# Patient Record
Sex: FEMALE | Race: WHITE | Employment: OTHER | URBAN - METROPOLITAN AREA
[De-identification: names, ages, dates, MRNs, and addresses within clinical notes are randomized per-mention and may not be internally consistent; named-entity substitution may affect disease eponyms.]

---

## 2017-03-06 ENCOUNTER — APPOINTMENT (OUTPATIENT)
Dept: ULTRASOUND IMAGING | Facility: HOSPITAL | Age: 57
DRG: 603 | End: 2017-03-06
Attending: EMERGENCY MEDICINE
Payer: MEDICARE

## 2017-03-06 ENCOUNTER — APPOINTMENT (OUTPATIENT)
Dept: GENERAL RADIOLOGY | Facility: HOSPITAL | Age: 57
DRG: 603 | End: 2017-03-06
Attending: EMERGENCY MEDICINE
Payer: MEDICARE

## 2017-03-06 ENCOUNTER — HOSPITAL ENCOUNTER (INPATIENT)
Facility: HOSPITAL | Age: 57
LOS: 4 days | Discharge: NURSING, REHAB OR LONG-TERM CARE | DRG: 603 | End: 2017-03-10
Attending: EMERGENCY MEDICINE | Admitting: HOSPITALIST
Payer: MEDICARE

## 2017-03-06 DIAGNOSIS — L03.116 CELLULITIS OF LEFT LOWER EXTREMITY: Primary | ICD-10-CM

## 2017-03-06 DIAGNOSIS — S80.12XA CONTUSION OF LEG, LEFT, INITIAL ENCOUNTER: ICD-10-CM

## 2017-03-06 PROBLEM — R73.9 HYPERGLYCEMIA: Status: ACTIVE | Noted: 2017-03-06

## 2017-03-06 PROBLEM — R79.89 AZOTEMIA: Status: ACTIVE | Noted: 2017-03-06

## 2017-03-06 LAB
ALBUMIN SERPL-MCNC: 3.2 G/DL (ref 3.5–4.8)
ALP LIVER SERPL-CCNC: 153 U/L (ref 46–118)
ALT SERPL-CCNC: 22 U/L (ref 14–54)
AST SERPL-CCNC: 15 U/L (ref 15–41)
BASOPHILS # BLD AUTO: 0.03 X10(3) UL (ref 0–0.1)
BASOPHILS NFR BLD AUTO: 0.5 %
BILIRUB SERPL-MCNC: 0.2 MG/DL (ref 0.1–2)
BUN BLD-MCNC: 25 MG/DL (ref 8–20)
CALCIUM BLD-MCNC: 9.4 MG/DL (ref 8.3–10.3)
CHLORIDE: 109 MMOL/L (ref 101–111)
CO2: 28 MMOL/L (ref 22–32)
CREAT BLD-MCNC: 0.66 MG/DL (ref 0.55–1.02)
EOSINOPHIL # BLD AUTO: 0.1 X10(3) UL (ref 0–0.3)
EOSINOPHIL NFR BLD AUTO: 1.6 %
ERYTHROCYTE [DISTWIDTH] IN BLOOD BY AUTOMATED COUNT: 13.5 % (ref 11.5–16)
GLUCOSE BLD-MCNC: 131 MG/DL (ref 70–99)
HCT VFR BLD AUTO: 41 % (ref 34–50)
HGB BLD-MCNC: 13.3 G/DL (ref 12–16)
IMMATURE GRANULOCYTE COUNT: 0.02 X10(3) UL (ref 0–1)
IMMATURE GRANULOCYTE RATIO %: 0.3 %
LYMPHOCYTES # BLD AUTO: 0.94 X10(3) UL (ref 0.9–4)
LYMPHOCYTES NFR BLD AUTO: 14.6 %
M PROTEIN MFR SERPL ELPH: 7.5 G/DL (ref 6.1–8.3)
MCH RBC QN AUTO: 29.8 PG (ref 27–33.2)
MCHC RBC AUTO-ENTMCNC: 32.4 G/DL (ref 31–37)
MCV RBC AUTO: 91.7 FL (ref 81–100)
MONOCYTES # BLD AUTO: 0.47 X10(3) UL (ref 0.1–0.6)
MONOCYTES NFR BLD AUTO: 7.3 %
NEUTROPHIL ABS PRELIM: 4.88 X10 (3) UL (ref 1.3–6.7)
NEUTROPHILS # BLD AUTO: 4.88 X10(3) UL (ref 1.3–6.7)
NEUTROPHILS NFR BLD AUTO: 75.7 %
PHENYTOIN (DILANTIN): 9.2 UG/ML (ref 10–20)
PLATELET # BLD AUTO: 206 10(3)UL (ref 150–450)
POTASSIUM SERPL-SCNC: 3.7 MMOL/L (ref 3.6–5.1)
RBC # BLD AUTO: 4.47 X10(6)UL (ref 3.8–5.1)
RED CELL DISTRIBUTION WIDTH-SD: 45.8 FL (ref 35.1–46.3)
SODIUM SERPL-SCNC: 144 MMOL/L (ref 136–144)
WBC # BLD AUTO: 6.4 X10(3) UL (ref 4–13)

## 2017-03-06 PROCEDURE — 99223 1ST HOSP IP/OBS HIGH 75: CPT | Performed by: HOSPITALIST

## 2017-03-06 PROCEDURE — 73552 X-RAY EXAM OF FEMUR 2/>: CPT

## 2017-03-06 PROCEDURE — 73590 X-RAY EXAM OF LOWER LEG: CPT

## 2017-03-06 PROCEDURE — 93970 EXTREMITY STUDY: CPT

## 2017-03-06 RX ORDER — ACETAMINOPHEN 325 MG/1
650 TABLET ORAL EVERY 6 HOURS PRN
Status: DISCONTINUED | OUTPATIENT
Start: 2017-03-06 | End: 2017-03-10

## 2017-03-06 RX ORDER — POTASSIUM CHLORIDE 20 MEQ/1
40 TABLET, EXTENDED RELEASE ORAL ONCE
Status: COMPLETED | OUTPATIENT
Start: 2017-03-06 | End: 2017-03-06

## 2017-03-06 RX ORDER — FAMOTIDINE 20 MG/1
20 TABLET ORAL 2 TIMES DAILY
Status: DISCONTINUED | OUTPATIENT
Start: 2017-03-06 | End: 2017-03-10

## 2017-03-06 RX ORDER — ENOXAPARIN SODIUM 100 MG/ML
40 INJECTION SUBCUTANEOUS DAILY
Status: DISCONTINUED | OUTPATIENT
Start: 2017-03-06 | End: 2017-03-10

## 2017-03-06 RX ORDER — FUROSEMIDE 40 MG/1
40 TABLET ORAL DAILY
Status: DISCONTINUED | OUTPATIENT
Start: 2017-03-06 | End: 2017-03-06

## 2017-03-06 RX ORDER — CALCITRIOL 0.25 UG/1
0.25 CAPSULE, LIQUID FILLED ORAL DAILY
COMMUNITY
End: 2018-07-21

## 2017-03-06 RX ORDER — SODIUM CHLORIDE 9 MG/ML
INJECTION, SOLUTION INTRAVENOUS CONTINUOUS
Status: CANCELLED | OUTPATIENT
Start: 2017-03-06 | End: 2017-03-06

## 2017-03-06 RX ORDER — PHENYTOIN SODIUM 100 MG/1
300 CAPSULE, EXTENDED RELEASE ORAL DAILY
Status: DISCONTINUED | OUTPATIENT
Start: 2017-03-06 | End: 2017-03-10

## 2017-03-06 RX ORDER — ONDANSETRON 2 MG/ML
4 INJECTION INTRAMUSCULAR; INTRAVENOUS EVERY 6 HOURS PRN
Status: DISCONTINUED | OUTPATIENT
Start: 2017-03-06 | End: 2017-03-10

## 2017-03-06 RX ORDER — METOCLOPRAMIDE HYDROCHLORIDE 5 MG/ML
10 INJECTION INTRAMUSCULAR; INTRAVENOUS EVERY 8 HOURS PRN
Status: DISCONTINUED | OUTPATIENT
Start: 2017-03-06 | End: 2017-03-10

## 2017-03-06 RX ORDER — SODIUM CHLORIDE 9 MG/ML
INJECTION, SOLUTION INTRAVENOUS ONCE
Status: COMPLETED | OUTPATIENT
Start: 2017-03-06 | End: 2017-03-06

## 2017-03-06 RX ORDER — CALCITRIOL 0.25 UG/1
0.25 CAPSULE, LIQUID FILLED ORAL DAILY
Status: DISCONTINUED | OUTPATIENT
Start: 2017-03-06 | End: 2017-03-10

## 2017-03-06 RX ORDER — FUROSEMIDE 10 MG/ML
40 INJECTION INTRAMUSCULAR; INTRAVENOUS
Status: DISCONTINUED | OUTPATIENT
Start: 2017-03-06 | End: 2017-03-08

## 2017-03-06 NOTE — ED PROVIDER NOTES
Patient Seen in: [facility] Emergency Department    History   Patient presents with:  Fall (musculoskeletal, neurologic)    Stated Complaint: fall    HPI    59-year-old female complaining of left leg pain.   This patient has cerebral palsy seizure di src 03/06/17 0708 Temporal   SpO2 03/06/17 0708 99 %   O2 Device 03/06/17 0708 None (Room air)       Current:/100 mmHg  Pulse 115  Temp(Src) 97.4 °F (36.3 °C) (Oral)  Resp 20  Ht 147.3 cm (4' 10\")  Wt 68.04 kg  BMI 31.36 kg/m2  SpO2 100%        Phys these tests on the individual orders.    PHENYTOIN (DILANTIN) TOTAL   RAINBOW DRAW BLUE   RAINBOW DRAW GOLD   RAINBOW DRAW LAVENDER   RAINBOW DRAW LIGHT GREEN   CBC W/ DIFFERENTIAL       MDM   Ultrasound and x-rays are unremarkable the BUN is 25 the patient

## 2017-03-06 NOTE — CONSULTS
[facility] dosing service    Initial Pharmacokinetic Consult for Vancomycin Dosing     Vy Madrigal is a 64year old female admitted on 3/6 who is being treated for cellulitis.   Pharmacy has been asked to dose Vancomycin by Dr. Veena Josue    She has No K Extension: [phone]

## 2017-03-06 NOTE — H&P
[facility] HOSPITALIST  History and Physical     Cecilia Lizzeth Patient Status:  Inpatient    1960 MRN [id number]   [facility] 5NW-A Attending Joshua Whyte, DO   Hosp Day # 0 PCP Ania Dubon     Chief Complaint: Fall    History of negatives noted in the HPI. Physical Exam:    /74 mmHg  Pulse 111  Temp(Src) 97.4 °F (36.3 °C) (Oral)  Resp 20  Ht 147.3 cm (4' 10\")  Wt 150 lb (68.04 kg)  BMI 31.36 kg/m2  SpO2 100%  General: No acute distress. Alert and oriented x 3.   HEENT: No DO  5/4/5442    **Certification        PHYSICIAN Certification of Need for Inpatient Hospitalization - Initial Certification      Patient will require inpatient services that will reasonably be expected to span two midnight's based on the clinical document

## 2017-03-06 NOTE — CM/SW NOTE
SW met with patient to assess for discharge needs. Patient identified she lives alone in a third story condo. Her condo building has an elevator. Patient has a motorized wheelchair at home.   Patient identified she has a Caregiver through an agency [facility]

## 2017-03-06 NOTE — SLP NOTE
ADULT SWALLOWING EVALUATION    ASSESSMENT & PLAN   ASSESSMENT  Bedside swallow evaluation completed. Per RN, pt demonstrated coughing with oral pills. Pt denies difficulty swallowing food/liquid, but reports, \"I usually take coated pills.  I've always had Functional  Symmetry: Within Functional Limits  Strength:  Within Functional Limits  Tone: Within Functional Limits  Range of Motion: Within Functional Limits  Rate of Motion: Within Functional Limits    Voice Quality: Clear  Respiratory Status: Unlabored

## 2017-03-06 NOTE — CONSULTS
INFECTIOUS DISEASE CONSULTATION    Giuliana Perez Patient Status:  Inpatient    1960 MRN [id number]   [facility] 5NW-A Attending Rafia Angel, [address] [facility] Day # 0 PCP Lindy Mcqueen Once **FOLLOWED BY** [START ON 3/7/2017] Vancomycin HCl (VANCOCIN) 750 mg in sodium chloride 0.9 % 250 mL IVPB, 15 mg/kg (Order-Specific), Intravenous, Q12H    Review of Systems:    A comprehensive 10 point review of systems was completed.   Pertinent posit primary service        Juan M Whittaker MD  [facility]  [phone]

## 2017-03-06 NOTE — CM/SW NOTE
Per medics, patient has called 911 multiple times due to \"falling out of wheelchair and unable to get up on her own\". Patient states she has been having trouble sitting up because her back is weak.  Patient would like to go to [facility] as an inpatient f

## 2017-03-06 NOTE — ED INITIAL ASSESSMENT (HPI)
Per report pt found on the floor near wheelchair with possible L leg injury & notable cellulitis of legs.  Pt lives alone, wheelchair bound with home j=health care

## 2017-03-06 NOTE — ED NOTES
Pt requested RN call her mother Shashi Solis at [phone]. RN called and updated pt's mother on plan of care. Pt's mother stated she has had cellulitis of her left leg multiple times. No questions at this time.

## 2017-03-06 NOTE — ED NOTES
Pt returned from ultrasound. Sleeping on cart. Updated that mother was notified, pt went back to sleep.

## 2017-03-07 LAB
BILIRUB UR QL STRIP.AUTO: NEGATIVE
BUN BLD-MCNC: 16 MG/DL (ref 8–20)
COLOR UR AUTO: YELLOW
CREAT BLD-MCNC: 0.65 MG/DL (ref 0.55–1.02)
GLUCOSE UR STRIP.AUTO-MCNC: NEGATIVE MG/DL
LEUKOCYTE ESTERASE UR QL STRIP.AUTO: NEGATIVE
NITRITE UR QL STRIP.AUTO: NEGATIVE
PH UR STRIP.AUTO: 5 [PH] (ref 4.5–8)
POTASSIUM SERPL-SCNC: 3.7 MMOL/L (ref 3.6–5.1)
PROT UR STRIP.AUTO-MCNC: 30 MG/DL
RBC #/AREA URNS AUTO: >10 /HPF
SP GR UR STRIP.AUTO: 1.03 (ref 1–1.03)
UROBILINOGEN UR STRIP.AUTO-MCNC: <2 MG/DL

## 2017-03-07 PROCEDURE — 99232 SBSQ HOSP IP/OBS MODERATE 35: CPT | Performed by: HOSPITALIST

## 2017-03-07 RX ORDER — POTASSIUM CHLORIDE 20 MEQ/1
40 TABLET, EXTENDED RELEASE ORAL ONCE
Status: COMPLETED | OUTPATIENT
Start: 2017-03-07 | End: 2017-03-07

## 2017-03-07 NOTE — PHYSICAL THERAPY NOTE
PHYSICAL THERAPY QUICK EVALUATION - INPATIENT    Room Number: 533/651-W  Evaluation Date: 3/7/2017  Presenting Problem: Cellultis of LLE  Physician Order: PT Eval and Treat    Problem List  Principal Problem:    Cellulitis of left lower extremity  Active P AM-PAC '6-Clicks' INPATIENT SHORT FORM - BASIC MOBILITY  How much difficulty does the patient currently have. ..  -   Turning over in bed (including adjusting bedclothes, sheets and blankets)?: Unable   -   Sitting down on and standing up from a chair w inability to don footwear as well as grossly 0/5 strength. Pt requires assistance with all activity at this time.   If pt/family not agreeable to any form of LTC, pt would benefit from kong lift to allow for changes of position and potentially to get out

## 2017-03-07 NOTE — PROGRESS NOTES
[facility]                INFECTIOUS DISEASE PROGRESS NOTE    Doyce Hence Patient Status:  Inpatient    1960 MRN [id number]   [facility] 5NW-A Attending Shawn Stephenson, [address] [facility] Day # 1 PCP DONN JONES     Antibiotic Gastroesophageal reflux disease      ASSESSMENT/PLAN:  1. Chronic massive lymphedema LLE  Superficial ulcerations/acute cellulitis  Stable on cefazolin  Wound care/lymphedema management    2.  Cerebral palsy      Abby Toure MD  [facility]

## 2017-03-07 NOTE — PROGRESS NOTES
[facility] HOSPITALIST  Progress Note     Marshal Rizzo Patient Status:  Inpatient    1960 MRN [id number]   [facility] 5NW-A Attending Ginny Campbell, [address] [facility] Day # 1 PCP Abbey Demarco     Chief Complaint: Fall    S: Patient seen and Intravenous Q8H       ASSESSMENT / PLAN:     1. LLE cellulitis  1. Infectious v stasis  2. Continue ancef per ID  3. U/S negative for DVT  2. B/L LE lymphedema  1. Continue lasix BID dosing  3. LE skin breakdown secondary to above  1.  Wound care eval pendi

## 2017-03-07 NOTE — PLAN OF CARE
Impaired Swallowing    • Minimize aspiration risk Progressing        PAIN - ADULT    • Verbalizes/displays adequate comfort level or patient's stated pain goal Progressing        Patient/Family Goals    • Patient/Family Short Term Goal Progressing

## 2017-03-07 NOTE — CONSULTS
[facility]  Report of Inpatient Wound Care Consultation     [place]il[place] Like Patient Status:  Inpatient    1960 MRN [id number]   [facility] 5NW-A Attending Jonas Gordon, DO   Hosp Day # 1 PCP DONN JONES     REASON FOR CONS palpate pulse, able to hear via doppler.    Observation:  Multiple scattered wounds to (L) lower leg.   Patient with (B) LE lymphedema, appears to be elephantiasis to (B) LE, +papillomas present to (B) LE, +Stemmer's sign.  Patient has 3 scattered partial t

## 2017-03-07 NOTE — SLP NOTE
SPEECH DAILY NOTE - INPATIENT    Evaluation Date: 03/07/2017    ASSESSMENT & PLAN   ASSESSMENT  Pt seen for dysphagia tx to assess tolerance with recommended diet, ensure proper utilization of aspiration precautions and provide pt/family education.   Per pt

## 2017-03-08 LAB
BUN BLD-MCNC: 16 MG/DL (ref 8–20)
CREAT BLD-MCNC: 0.51 MG/DL (ref 0.55–1.02)
POTASSIUM SERPL-SCNC: 3.8 MMOL/L (ref 3.6–5.1)

## 2017-03-08 PROCEDURE — 99232 SBSQ HOSP IP/OBS MODERATE 35: CPT | Performed by: HOSPITALIST

## 2017-03-08 RX ORDER — POTASSIUM CHLORIDE 20 MEQ/1
40 TABLET, EXTENDED RELEASE ORAL ONCE
Status: COMPLETED | OUTPATIENT
Start: 2017-03-08 | End: 2017-03-08

## 2017-03-08 RX ORDER — FUROSEMIDE 40 MG/1
40 TABLET ORAL DAILY
Status: DISCONTINUED | OUTPATIENT
Start: 2017-03-09 | End: 2017-03-10

## 2017-03-08 NOTE — PROGRESS NOTES
[facility]                INFECTIOUS DISEASE PROGRESS NOTE    Kamala Maradiaga Patient Status:  Inpatient    1960 MRN [id number]   [facility] [place] 5NW-A Attending Beto Mckinney, [address] [facility] Day # 2 PCP Silva JONES     Antibiotic Cellulitis of left lower extremity     Contusion of leg, left, initial encounter     Gastroesophageal reflux disease      ASSESSMENT/PLAN:  1.  Chronic massive lymphedema LLE  Superficial ulcerations/acute cellulitis  Slow improvement on cefazolin  -continu

## 2017-03-08 NOTE — CM/SW NOTE
MSW spoke to patient at length about post d/c needs. Patient was explained PT REC for LTC vs. 24 Hr supervision. She states that PT does not understand, she can not get anymore hours then she already does. She does not want to go to a nursing home.  She sta

## 2017-03-08 NOTE — PROGRESS NOTES
[facility] HOSPITALIST  Progress Note     [place] Record Patient Status:  Inpatient    1960 MRN [id number]   [facility] 5NW-A Attending Bryson Bartlett, [address] [facility] Day # 2 PCP Gerardo Ahr     Chief Complaint: Fall    S: Patient seen and Intravenous BID (Diuretic)   • calciTRIOL  0.25 mcg Oral Daily   • ceFAZolin  2 g Intravenous Q8H       ASSESSMENT / PLAN:     1. LLE cellulitis  1. Continue ancef per ID  2. U/S negative for DVT  2. B/L LE lymphedema  1. Close to baseline  2.  Resume PO la

## 2017-03-08 NOTE — PLAN OF CARE
Impaired Activities of Daily Living    • Achieve highest/safest level of independence in self care Progressing        Impaired Swallowing    • Minimize aspiration risk Progressing        PAIN - ADULT    • Verbalizes/displays adequate comfort level or patie

## 2017-03-08 NOTE — OCCUPATIONAL THERAPY NOTE
OCCUPATIONAL THERAPY QUICK EVALUATION - INPATIENT    Room Number: 303/981-H  Evaluation Date: 3/8/2017     Type of Evaluation: Quick Eval  Presenting Problem: L LE cellulitis     Physician Order: IP Consult to Occupational Therapy  Reason for Therapy:  ADL manage pants or underpants so she wears a long night shirt daily. Pt is not able to wear pants/socks/shoes due to B LE lymphedema. Pt reports that PTA, she was independent with self-feeding and grooming.  Pt sleeps in her W/C due to difficulty with bed mobi Score:   Score: 10  Approx Degree of Impairment: 74.7%  Standardized Score (AM-PAC Scale): 27.31  CMS Modifier (G-Code): CL    FUNCTIONAL TRANSFER ASSESSMENT  Supine to Sit : Not tested  Sit to Stand: Not tested    Skilled Therapy Provided: Pt was found in

## 2017-03-09 LAB
BUN BLD-MCNC: 13 MG/DL (ref 8–20)
CREAT BLD-MCNC: 0.49 MG/DL (ref 0.55–1.02)
POTASSIUM SERPL-SCNC: 4.1 MMOL/L (ref 3.6–5.1)

## 2017-03-09 PROCEDURE — 99232 SBSQ HOSP IP/OBS MODERATE 35: CPT | Performed by: HOSPITALIST

## 2017-03-09 RX ORDER — CEPHALEXIN 500 MG/1
500 CAPSULE ORAL 3 TIMES DAILY
Qty: 42 CAPSULE | Refills: 0 | Status: SHIPPED | OUTPATIENT
Start: 2017-03-09 | End: 2017-03-22

## 2017-03-09 NOTE — PROGRESS NOTES
[facility] HOSPITALIST  Progress Note     [place] Record Patient Status:  Inpatient    1960 MRN [id number]   [facility] 5NW-A Attending Bryson Bartlett, [address] [facility] Day # 3 PCP Gerardo Ahr     Chief Complaint: Fall    S: Patient has no c skin breakdown secondary to above  1. Wound care eval pending   4. Cerebral palsy   5. Seizure disorder  1. Continue dilantin    6. GERD  1. Pepcid  7. Disposition  1. Cont. IV ABX  2. Plan to d/c tomorrow  3.  Will need 24 hour care as OP    Dispo: can d/c

## 2017-03-09 NOTE — CM/SW NOTE
MSW spoke to [place] who state that due to the natural of the referral it must be reviewed by their business office/DON which will not be done until Friday. MSW updated Mother and sister.  Spoke to Bedside RN who states patient is d/c but MD wants her to

## 2017-03-09 NOTE — CM/SW NOTE
MSW called mother's # and there was no answer, Message states this customer is not available. MSW called bedside RN, asked to be called if any family are present. MSW to follow.

## 2017-03-09 NOTE — PROGRESS NOTES
[facility]                INFECTIOUS DISEASE PROGRESS NOTE    Hunter Aguirre Patient Status:  Inpatient    1960 MRN [id number]   [facility] 5NW-A Attending Lui Arnett, [address] [facility] Day # 3 PCP [facility]  [phone]

## 2017-03-09 NOTE — CM/SW NOTE
MSW called by patient sisters to discuss post d/c needs (sister cell- [phone]).    MSW reviewed PT Rec:  MSW told both patient, mother and her sister that they need to start looking at [address] options, and plan for the time when patient/sister/d

## 2017-03-09 NOTE — CM/SW NOTE
1:34pm   MSW spoke to patient again- she is still agreeable for MSW to call her mother or her sister to discuss d/c planning. Per Tech no family has been there present. MSW called patient's mother (1:35pm) and spoke to her about post d/c.  Per mother th

## 2017-03-09 NOTE — CM/SW NOTE
MSW called and spoke to DON screener in case DON needed. Mimi Gill will call PACT to find out what role she will have if DON Screen needed and will f/u with MSW.     MSW spoke to patient at 12:24pm, she gave MSW her mom's cell phone #. [phone] and perm

## 2017-03-10 VITALS
WEIGHT: 150 LBS | BODY MASS INDEX: 31.49 KG/M2 | HEIGHT: 58 IN | OXYGEN SATURATION: 94 % | SYSTOLIC BLOOD PRESSURE: 123 MMHG | RESPIRATION RATE: 20 BRPM | DIASTOLIC BLOOD PRESSURE: 69 MMHG | HEART RATE: 85 BPM | TEMPERATURE: 99 F

## 2017-03-10 LAB
BUN BLD-MCNC: 13 MG/DL (ref 8–20)
CREAT BLD-MCNC: 0.45 MG/DL (ref 0.55–1.02)

## 2017-03-10 PROCEDURE — 99239 HOSP IP/OBS DSCHRG MGMT >30: CPT | Performed by: HOSPITALIST

## 2017-03-10 NOTE — PROGRESS NOTES
[facility]                INFECTIOUS DISEASE PROGRESS NOTE    Sanchez Cast Patient Status:  Inpatient    1960 MRN [id number]   [facility] 5NW-A Attending Marilyn Parra, [address] [facility] Day # 4 PCP DONN JONES     Antibiotic expresses concern that she was not receiving adequate care at home - and lives alone    2.  Cerebral palsy      Yue Bonner MD  [facility]  [phone]

## 2017-03-10 NOTE — CM/SW NOTE
MSW updated by [facility] is willing to take patient. MSW spoke to patient and her sister who are both agreeable to d/c to [facility]. [employer] used to set up Ambulance and it was faxed in. Copy on chart.

## 2017-03-10 NOTE — CM/SW NOTE
[facility] called MSW and was updated that patient was not accepted to [address] [place] [address]. Mother updated.   MSW updated 5th floor MSW and bedside Rn Rebecca updated and she states she will talk to MD.

## 2017-03-10 NOTE — PROGRESS NOTES
[facility] HOSPITALIST  Progress Note     Tushar Williamson Patient Status:  Inpatient    1960 MRN [id number]   [facility] 5NW-A Attending Charisma Merchant, [address] [facility] Day # 4 PCP Elliott Payment     Chief Complaint: Fall    S: Patient feels we secondary to above  1. Wound care eval pending   4. Cerebral palsy   5. Seizure disorder  1. Continue dilantin    6. GERD  1. Pepcid  7. Disposition  1. Cont. IV ABX  2. Plan to d/c tomorrow  3.  Will need 24 hour care as OP    Dispo: plan to d/c to [facility]

## 2017-03-10 NOTE — PROGRESS NOTES
Multidisciplinary Discharge Rounds held 3/10/2017. Treatment team members present today include , , Charge Nurse,  Nurse, RT, PT and Pharmacy caring for Kamala Maradiaga.      Other care providers present:    Patient Active Proble

## 2017-03-10 NOTE — PLAN OF CARE
DISCHARGE PLANNING    • Discharge to home or other facility with appropriate resources Progressing        Impaired Functional Mobility    • Achieve highest/safest level of mobility/gait Progressing        Impaired Swallowing    • Minimize aspiration risk P

## 2017-03-10 NOTE — PLAN OF CARE
DISCHARGE PLANNING    • Discharge to home or other facility with appropriate resources Adequate for Discharge        Impaired Functional Mobility    • Achieve highest/safest level of mobility/gait Adequate for Discharge        Impaired Swallowing    • Mini

## 2017-03-10 NOTE — PROGRESS NOTES
Patient presented side-lying in bed; VSS and agreeable to participate. Performed BLE PROM and BUE AROM restorative regimens as recommended by PT/OT. Upon completion, patient made comfortable in bed w/ call light in reach. JIAN Alicea aware of session.

## 2017-03-10 NOTE — PROGRESS NOTES
NURSING DISCHARGE NOTE    Discharged Nursing home via Ambulance. Accompanied by Support staff  Belongings Taken by patient/family. Pt received discharge instructions and education. PIV removed. Report called to [facility].  Dressing on left leg c/d/

## 2017-03-11 ENCOUNTER — LAB REQUISITION (OUTPATIENT)
Dept: LAB | Facility: HOSPITAL | Age: 57
End: 2017-03-11
Attending: FAMILY MEDICINE
Payer: MEDICARE

## 2017-03-11 DIAGNOSIS — L03.119 CELLULITIS OF EXTREMITY: ICD-10-CM

## 2017-03-11 LAB
ALBUMIN SERPL-MCNC: 2.4 G/DL (ref 3.5–4.8)
ALP LIVER SERPL-CCNC: 106 U/L (ref 46–118)
ALT SERPL-CCNC: 20 U/L (ref 14–54)
AST SERPL-CCNC: 32 U/L (ref 15–41)
BASOPHILS # BLD AUTO: 0.04 X10(3) UL (ref 0–0.1)
BASOPHILS NFR BLD AUTO: 0.8 %
BILIRUB SERPL-MCNC: 0.1 MG/DL (ref 0.1–2)
BUN BLD-MCNC: 17 MG/DL (ref 8–20)
CALCIUM BLD-MCNC: 8.9 MG/DL (ref 8.3–10.3)
CHLORIDE: 104 MMOL/L (ref 101–111)
CO2: 30 MMOL/L (ref 22–32)
CREAT BLD-MCNC: 0.48 MG/DL (ref 0.55–1.02)
EOSINOPHIL # BLD AUTO: 0.19 X10(3) UL (ref 0–0.3)
EOSINOPHIL NFR BLD AUTO: 4 %
ERYTHROCYTE [DISTWIDTH] IN BLOOD BY AUTOMATED COUNT: 13.1 % (ref 11.5–16)
GLUCOSE BLD-MCNC: 86 MG/DL (ref 70–99)
HAV IGM SER QL: 2.2 MG/DL (ref 1.7–3)
HCT VFR BLD AUTO: 37 % (ref 34–50)
HGB BLD-MCNC: 12.1 G/DL (ref 12–16)
IMMATURE GRANULOCYTE COUNT: 0.01 X10(3) UL (ref 0–1)
IMMATURE GRANULOCYTE RATIO %: 0.2 %
LYMPHOCYTES # BLD AUTO: 1.09 X10(3) UL (ref 0.9–4)
LYMPHOCYTES NFR BLD AUTO: 22.8 %
M PROTEIN MFR SERPL ELPH: 6.5 G/DL (ref 6.1–8.3)
MCH RBC QN AUTO: 29.2 PG (ref 27–33.2)
MCHC RBC AUTO-ENTMCNC: 32.7 G/DL (ref 31–37)
MCV RBC AUTO: 89.4 FL (ref 81–100)
MONOCYTES # BLD AUTO: 0.5 X10(3) UL (ref 0.1–0.6)
MONOCYTES NFR BLD AUTO: 10.4 %
NEUTROPHIL ABS PRELIM: 2.96 X10 (3) UL (ref 1.3–6.7)
NEUTROPHILS # BLD AUTO: 2.96 X10(3) UL (ref 1.3–6.7)
NEUTROPHILS NFR BLD AUTO: 61.8 %
PLATELET # BLD AUTO: 195 10(3)UL (ref 150–450)
POTASSIUM SERPL-SCNC: 3.9 MMOL/L (ref 3.6–5.1)
RBC # BLD AUTO: 4.14 X10(6)UL (ref 3.8–5.1)
RED CELL DISTRIBUTION WIDTH-SD: 43.1 FL (ref 35.1–46.3)
SODIUM SERPL-SCNC: 142 MMOL/L (ref 136–144)
WBC # BLD AUTO: 4.8 X10(3) UL (ref 4–13)

## 2017-03-11 PROCEDURE — 83735 ASSAY OF MAGNESIUM: CPT | Performed by: FAMILY MEDICINE

## 2017-03-11 PROCEDURE — 80053 COMPREHEN METABOLIC PANEL: CPT | Performed by: FAMILY MEDICINE

## 2017-03-11 PROCEDURE — 36415 COLL VENOUS BLD VENIPUNCTURE: CPT | Performed by: FAMILY MEDICINE

## 2017-03-11 PROCEDURE — 85025 COMPLETE CBC W/AUTO DIFF WBC: CPT | Performed by: FAMILY MEDICINE

## 2017-03-11 NOTE — DISCHARGE SUMMARY
[facility] HOSPITALIST  DISCHARGE SUMMARY     Eddye Spotted Patient Status:  Inpatient    1960 MRN [id number]   [facility] 5NW-A Attending No att. providers found   Hosp Day # 4 PCP Kel Fairbanks     Date of Admission: 3/6/2017  Date discharge on oral antibiotics. The patient currently lives by herself and has some home health care. It is  felt that the patient would likely benefit from a higher level of care.   The patient is decisional and she was agreeable to go to rehabilitation f visit        Vital signs:  Temp:  [98.5 °F (36.9 °C)] 98.5 °F (36.9 °C)  Pulse:  [85] 85  Resp:  [20] 20  BP: (123)/(69) 123/69 mmHg    Physical Exam:    General: No acute distress.    Respiratory: Clear to auscultation bilaterally  Cardiovascular: S1, S2

## 2017-03-13 ENCOUNTER — SNF VISIT (OUTPATIENT)
Dept: INTERNAL MEDICINE CLINIC | Age: 57
End: 2017-03-13

## 2017-03-13 VITALS — HEART RATE: 86 BPM | OXYGEN SATURATION: 95 %

## 2017-03-13 DIAGNOSIS — L03.116 CELLULITIS OF LEFT LOWER EXTREMITY: Primary | ICD-10-CM

## 2017-03-13 DIAGNOSIS — I89.0 LYMPHEDEMA: ICD-10-CM

## 2017-03-13 DIAGNOSIS — G40.909 SEIZURE DISORDER (HCC): ICD-10-CM

## 2017-03-13 DIAGNOSIS — G80.9 CEREBRAL PALSY, UNSPECIFIED TYPE (HCC): ICD-10-CM

## 2017-03-13 DIAGNOSIS — K21.9 GASTROESOPHAGEAL REFLUX DISEASE, ESOPHAGITIS PRESENCE NOT SPECIFIED: ICD-10-CM

## 2017-03-13 DIAGNOSIS — E87.6 HYPOKALEMIA: ICD-10-CM

## 2017-03-13 DIAGNOSIS — K59.00 CONSTIPATION, UNSPECIFIED CONSTIPATION TYPE: ICD-10-CM

## 2017-03-13 PROCEDURE — 99310 SBSQ NF CARE HIGH MDM 45: CPT | Performed by: NURSE PRACTITIONER

## 2017-03-13 NOTE — PROGRESS NOTES
Mireyacharlotte Dinh  : 1960  Age 64year old  female patient is admitted to Facility: [place] [address] for [facility] after hospitalization for LLE cellulitis.     [address] [place] [address] Drive date:    3.6.17  Discharge date to [facility]:    3.10.17  ELOS: (500 mg total) by mouth 3 (three) times daily. Disp: 42 capsule Rfl: 0   calciTRIOL 0.25 MCG Oral Cap Take 0.25 mcg by mouth daily. Disp:  Rfl:    POTASSIUM CHLORIDE ER OR Take 20 mEq by mouth daily.    Disp:  Rfl:    famoTIDine 20 MG Oral Tab Take 20 mg by moist, pharynx no exudate, no visible cerumen.   NECK: supple; FROM; no JVD, no TMG, no carotid bruits  BREAST: ---deferred  RESPIRATORY:clear to percussion and auscultation; no wheezing  CARDIOVASCULAR: S1, S2 normal, RRR; no S3, no S4; , no click, no murm 09/20/2011       Lab Results  Component Value Date   MG 2.2 03/11/2017         [facility] records, notes, lab and imaging results reviewed. Medication reconciliation completed. SEE PLAN BELOW  Left LE cellulitis  1.  Cephalexin 500 mg q 8 hrs un

## 2017-03-14 NOTE — CM/SW NOTE
Patient discharged on 03/10/2017 as previously planned.        03/14/17 0800   Discharge disposition   Discharged to: Skilled Nurs   Name of [address]   Patient is Discharged to a [address] [place]   Discharge transp

## 2017-03-15 ENCOUNTER — SNF VISIT (OUTPATIENT)
Dept: INTERNAL MEDICINE CLINIC | Age: 57
End: 2017-03-15

## 2017-03-15 VITALS — HEART RATE: 79 BPM | OXYGEN SATURATION: 94 %

## 2017-03-15 DIAGNOSIS — I89.0 LYMPHEDEMA: ICD-10-CM

## 2017-03-15 DIAGNOSIS — G80.9 CEREBRAL PALSY, UNSPECIFIED TYPE (HCC): ICD-10-CM

## 2017-03-15 DIAGNOSIS — L03.116 CELLULITIS OF LEFT LOWER EXTREMITY: Primary | ICD-10-CM

## 2017-03-15 PROCEDURE — 99308 SBSQ NF CARE LOW MDM 20: CPT | Performed by: NURSE PRACTITIONER

## 2017-03-15 NOTE — PROGRESS NOTES
Kamala Maradiaga, 12/28/1960, 64year old, female    Chief Complaint:  Patient presents with: Follow - Up: LLE cellulitis       Subjective:  PMH significant for cerebral palsy, seizure disorder, GERD, and chronic lymphedema of LEs.    In [facility] review. Medical decision making:    LLE cellulitis. Remains afebrile and asymptomatic. Tolerating abx w/o AE. CPM w/ cephalexin until 3.23.17. Needs better DC plan. Requires either 24/7 care or transition to LTC.   SW and  to d/w pt and

## 2017-03-17 ENCOUNTER — LAB REQUISITION (OUTPATIENT)
Dept: LAB | Facility: HOSPITAL | Age: 57
End: 2017-03-17
Attending: INTERNAL MEDICINE
Payer: MEDICARE

## 2017-03-17 DIAGNOSIS — R69 ILLNESS: ICD-10-CM

## 2017-03-17 LAB — 25-HYDROXYVITAMIN D (TOTAL): 6.1 NG/ML (ref 30–100)

## 2017-03-17 PROCEDURE — 36415 COLL VENOUS BLD VENIPUNCTURE: CPT | Performed by: INTERNAL MEDICINE

## 2017-03-17 PROCEDURE — 82306 VITAMIN D 25 HYDROXY: CPT | Performed by: INTERNAL MEDICINE

## 2017-03-22 ENCOUNTER — HOSPITAL ENCOUNTER (INPATIENT)
Facility: HOSPITAL | Age: 57
LOS: 2 days | Discharge: HOME HEALTH | DRG: 683 | End: 2017-03-24
Attending: EMERGENCY MEDICINE | Admitting: HOSPITALIST
Payer: MEDICARE

## 2017-03-22 ENCOUNTER — APPOINTMENT (OUTPATIENT)
Dept: CT IMAGING | Facility: HOSPITAL | Age: 57
DRG: 683 | End: 2017-03-22
Attending: EMERGENCY MEDICINE
Payer: MEDICARE

## 2017-03-22 ENCOUNTER — APPOINTMENT (OUTPATIENT)
Dept: GENERAL RADIOLOGY | Facility: HOSPITAL | Age: 57
DRG: 683 | End: 2017-03-22
Attending: EMERGENCY MEDICINE
Payer: MEDICARE

## 2017-03-22 DIAGNOSIS — N17.9 ACUTE RENAL FAILURE, UNSPECIFIED ACUTE RENAL FAILURE TYPE (HCC): ICD-10-CM

## 2017-03-22 DIAGNOSIS — E86.0 DEHYDRATION: Primary | ICD-10-CM

## 2017-03-22 DIAGNOSIS — R11.2 NAUSEA AND VOMITING IN ADULT: ICD-10-CM

## 2017-03-22 LAB
ALBUMIN SERPL-MCNC: 3 G/DL (ref 3.5–4.8)
ALP LIVER SERPL-CCNC: 146 U/L (ref 46–118)
ALT SERPL-CCNC: 18 U/L (ref 14–54)
AST SERPL-CCNC: 15 U/L (ref 15–41)
ATRIAL RATE: 95 BPM
BASOPHILS # BLD AUTO: 0.06 X10(3) UL (ref 0–0.1)
BASOPHILS NFR BLD AUTO: 0.3 %
BILIRUB SERPL-MCNC: 0.4 MG/DL (ref 0.1–2)
BILIRUB UR QL STRIP.AUTO: NEGATIVE
BUN BLD-MCNC: 62 MG/DL (ref 8–20)
CALCIUM BLD-MCNC: 9.2 MG/DL (ref 8.3–10.3)
CHLORIDE: 97 MMOL/L (ref 101–111)
CO2: 21 MMOL/L (ref 22–32)
CREAT BLD-MCNC: 1.93 MG/DL (ref 0.55–1.02)
EOSINOPHIL # BLD AUTO: 0.01 X10(3) UL (ref 0–0.3)
EOSINOPHIL NFR BLD AUTO: 0.1 %
ERYTHROCYTE [DISTWIDTH] IN BLOOD BY AUTOMATED COUNT: 13.3 % (ref 11.5–16)
GLUCOSE BLD-MCNC: 141 MG/DL (ref 70–99)
GLUCOSE UR STRIP.AUTO-MCNC: NEGATIVE MG/DL
HCT VFR BLD AUTO: 52.4 % (ref 34–50)
HGB BLD-MCNC: 17.6 G/DL (ref 12–16)
IMMATURE GRANULOCYTE COUNT: 0.1 X10(3) UL (ref 0–1)
IMMATURE GRANULOCYTE RATIO %: 0.5 %
LEUKOCYTE ESTERASE UR QL STRIP.AUTO: NEGATIVE
LIPASE: 79 U/L (ref 73–393)
LYMPHOCYTES # BLD AUTO: 1.89 X10(3) UL (ref 0.9–4)
LYMPHOCYTES NFR BLD AUTO: 10.4 %
M PROTEIN MFR SERPL ELPH: 7.9 G/DL (ref 6.1–8.3)
MCH RBC QN AUTO: 29.6 PG (ref 27–33.2)
MCHC RBC AUTO-ENTMCNC: 33.6 G/DL (ref 31–37)
MCV RBC AUTO: 88.2 FL (ref 81–100)
MONOCYTES # BLD AUTO: 1.39 X10(3) UL (ref 0.1–0.6)
MONOCYTES NFR BLD AUTO: 7.6 %
NEUTROPHIL ABS PRELIM: 14.8 X10 (3) UL (ref 1.3–6.7)
NEUTROPHILS # BLD AUTO: 14.8 X10(3) UL (ref 1.3–6.7)
NEUTROPHILS NFR BLD AUTO: 81.1 %
NITRITE UR QL STRIP.AUTO: NEGATIVE
P AXIS: 46 DEGREES
P-R INTERVAL: 140 MS
PH UR STRIP.AUTO: 5 [PH] (ref 4.5–8)
PHENYTOIN (DILANTIN): 3.3 UG/ML (ref 10–20)
PLATELET # BLD AUTO: 285 10(3)UL (ref 150–450)
POTASSIUM SERPL-SCNC: 5.5 MMOL/L (ref 3.6–5.1)
PROT UR STRIP.AUTO-MCNC: NEGATIVE MG/DL
Q-T INTERVAL: 350 MS
QRS DURATION: 72 MS
QTC CALCULATION (BEZET): 439 MS
R AXIS: 40 DEGREES
RBC # BLD AUTO: 5.94 X10(6)UL (ref 3.8–5.1)
RBC UR QL AUTO: NEGATIVE
RED CELL DISTRIBUTION WIDTH-SD: 42.2 FL (ref 35.1–46.3)
SODIUM SERPL-SCNC: 132 MMOL/L (ref 136–144)
SP GR UR STRIP.AUTO: 1.02 (ref 1–1.03)
T AXIS: 51 DEGREES
UROBILINOGEN UR STRIP.AUTO-MCNC: <2 MG/DL
VENTRICULAR RATE: 95 BPM
WBC # BLD AUTO: 18.3 X10(3) UL (ref 4–13)

## 2017-03-22 PROCEDURE — 74176 CT ABD & PELVIS W/O CONTRAST: CPT

## 2017-03-22 PROCEDURE — 99223 1ST HOSP IP/OBS HIGH 75: CPT | Performed by: HOSPITALIST

## 2017-03-22 PROCEDURE — 74020 XR ABDOMEN, OBSTRUCTIVE SERIES (CPT=74020): CPT

## 2017-03-22 RX ORDER — BISACODYL 10 MG
10 SUPPOSITORY, RECTAL RECTAL
Status: DISCONTINUED | OUTPATIENT
Start: 2017-03-22 | End: 2017-03-24

## 2017-03-22 RX ORDER — POTASSIUM CHLORIDE 20 MEQ/1
20 TABLET, EXTENDED RELEASE ORAL DAILY
COMMUNITY
End: 2018-07-21

## 2017-03-22 RX ORDER — SODIUM CHLORIDE 9 MG/ML
INJECTION, SOLUTION INTRAVENOUS ONCE
Status: COMPLETED | OUTPATIENT
Start: 2017-03-22 | End: 2017-03-22

## 2017-03-22 RX ORDER — ONDANSETRON 2 MG/ML
4 INJECTION INTRAMUSCULAR; INTRAVENOUS EVERY 4 HOURS PRN
Status: DISCONTINUED | OUTPATIENT
Start: 2017-03-22 | End: 2017-03-22

## 2017-03-22 RX ORDER — ONDANSETRON 2 MG/ML
4 INJECTION INTRAMUSCULAR; INTRAVENOUS EVERY 6 HOURS PRN
Status: DISCONTINUED | OUTPATIENT
Start: 2017-03-22 | End: 2017-03-24

## 2017-03-22 RX ORDER — PHENYTOIN SODIUM 100 MG/1
300 CAPSULE, EXTENDED RELEASE ORAL DAILY
Status: DISCONTINUED | OUTPATIENT
Start: 2017-03-22 | End: 2017-03-24

## 2017-03-22 RX ORDER — CEPHALEXIN 500 MG/1
500 CAPSULE ORAL 3 TIMES DAILY
Status: ON HOLD | COMMUNITY
Start: 2017-03-09 | End: 2017-03-24

## 2017-03-22 RX ORDER — LACTULOSE 10 G/15ML
20 SOLUTION ORAL 2 TIMES DAILY
Status: DISCONTINUED | OUTPATIENT
Start: 2017-03-22 | End: 2017-03-24

## 2017-03-22 RX ORDER — FAMOTIDINE 20 MG/1
20 TABLET ORAL DAILY PRN
Status: DISCONTINUED | OUTPATIENT
Start: 2017-03-22 | End: 2017-03-24

## 2017-03-22 RX ORDER — SODIUM CHLORIDE 9 MG/ML
INJECTION, SOLUTION INTRAVENOUS CONTINUOUS
Status: ACTIVE | OUTPATIENT
Start: 2017-03-22 | End: 2017-03-22

## 2017-03-22 RX ORDER — CALCITRIOL 0.25 UG/1
0.25 CAPSULE, LIQUID FILLED ORAL DAILY
Status: DISCONTINUED | OUTPATIENT
Start: 2017-03-22 | End: 2017-03-24

## 2017-03-22 RX ORDER — ONDANSETRON 2 MG/ML
4 INJECTION INTRAMUSCULAR; INTRAVENOUS ONCE
Status: DISCONTINUED | OUTPATIENT
Start: 2017-03-22 | End: 2017-03-24

## 2017-03-22 RX ORDER — HEPARIN SODIUM 5000 [USP'U]/ML
5000 INJECTION, SOLUTION INTRAVENOUS; SUBCUTANEOUS EVERY 12 HOURS
Status: DISCONTINUED | OUTPATIENT
Start: 2017-03-22 | End: 2017-03-24

## 2017-03-22 RX ORDER — ACETAMINOPHEN 325 MG/1
650 TABLET ORAL EVERY 6 HOURS PRN
Status: DISCONTINUED | OUTPATIENT
Start: 2017-03-22 | End: 2017-03-24

## 2017-03-22 RX ORDER — SENNA AND DOCUSATE SODIUM 50; 8.6 MG/1; MG/1
2 TABLET, FILM COATED ORAL 2 TIMES DAILY
Status: DISCONTINUED | OUTPATIENT
Start: 2017-03-22 | End: 2017-03-24

## 2017-03-22 NOTE — ED PROVIDER NOTES
Patient Seen in: [facility] Emergency Department    History   Patient presents with:  Nausea/Vomiting/Diarrhea (gastrointestinal)  Cellulitis (integumentary, infectious)    Stated Complaint:     HPI    68-year-old female with history of seizure diso HPI.    Physical Exam       ED Triage Vitals   BP 03/22/17 1603 98/86 mmHg   Pulse 03/22/17 1601 100   Resp 03/22/17 1601 21   Temp 03/22/17 1601 98.9 °F (37.2 °C)   Temp src 03/22/17 1601 Temporal   SpO2 03/22/17 1601 99 %   O2 Device 03/22/17 1601 None ( the following:     WBC 18.3 (*)     RBC 5.94 (*)     HGB 17.6 (*)     HCT 52.4 (*)     Neutrophil Absolute Prelim 14.80 (*)     Neutrophil Absolute 14.80 (*)     Monocyte Absolute 1.39 (*)     All other components within normal limits   LIPASE - Normal   C

## 2017-03-23 LAB
BASOPHILS # BLD AUTO: 0.06 X10(3) UL (ref 0–0.1)
BASOPHILS NFR BLD AUTO: 0.4 %
BUN BLD-MCNC: 68 MG/DL (ref 8–20)
CALCIUM BLD-MCNC: 8.9 MG/DL (ref 8.3–10.3)
CHLORIDE: 101 MMOL/L (ref 101–111)
CO2: 23 MMOL/L (ref 22–32)
CREAT BLD-MCNC: 1.6 MG/DL (ref 0.55–1.02)
EOSINOPHIL # BLD AUTO: 0.01 X10(3) UL (ref 0–0.3)
EOSINOPHIL NFR BLD AUTO: 0.1 %
ERYTHROCYTE [DISTWIDTH] IN BLOOD BY AUTOMATED COUNT: 13.1 % (ref 11.5–16)
GLUCOSE BLD-MCNC: 127 MG/DL (ref 70–99)
HAV IGM SER QL: 2.5 MG/DL (ref 1.7–3)
HCT VFR BLD AUTO: 46.8 % (ref 34–50)
HGB BLD-MCNC: 15.1 G/DL (ref 12–16)
IMMATURE GRANULOCYTE COUNT: 0.06 X10(3) UL (ref 0–1)
IMMATURE GRANULOCYTE RATIO %: 0.4 %
LYMPHOCYTES # BLD AUTO: 1.93 X10(3) UL (ref 0.9–4)
LYMPHOCYTES NFR BLD AUTO: 12.2 %
MCH RBC QN AUTO: 29.2 PG (ref 27–33.2)
MCHC RBC AUTO-ENTMCNC: 32.3 G/DL (ref 31–37)
MCV RBC AUTO: 90.5 FL (ref 81–100)
MONOCYTES # BLD AUTO: 1.2 X10(3) UL (ref 0.1–0.6)
MONOCYTES NFR BLD AUTO: 7.6 %
NEUTROPHIL ABS PRELIM: 12.5 X10 (3) UL (ref 1.3–6.7)
NEUTROPHILS # BLD AUTO: 12.5 X10(3) UL (ref 1.3–6.7)
NEUTROPHILS NFR BLD AUTO: 79.3 %
PLATELET # BLD AUTO: 242 10(3)UL (ref 150–450)
POTASSIUM SERPL-SCNC: 4.8 MMOL/L (ref 3.6–5.1)
RBC # BLD AUTO: 5.17 X10(6)UL (ref 3.8–5.1)
RED CELL DISTRIBUTION WIDTH-SD: 43.3 FL (ref 35.1–46.3)
SODIUM SERPL-SCNC: 136 MMOL/L (ref 136–144)
WBC # BLD AUTO: 15.8 X10(3) UL (ref 4–13)

## 2017-03-23 PROCEDURE — 99232 SBSQ HOSP IP/OBS MODERATE 35: CPT | Performed by: HOSPITALIST

## 2017-03-23 NOTE — CM/SW NOTE
03/23/17 1100   CM/SW Referral Data   Referral Source Physician;Social Work (self-referral)   Reason for Referral Discharge planning;Readmission   Informant Patient   Readmission Assessment   Factors that patient feels contributed to this readmission Ot d/c planning assessment, readmission from 3/13 and discharged from [facility] on 3/20. Patient lives alone in a 3rd floor condo with elevator. Has an electric wheelchair, spends entire day and night in it.   Mom and sister's are involved with care, meal pr

## 2017-03-23 NOTE — PROGRESS NOTES
[facility] HOSPITALIST  Progress note     Darryle Buoy Patient Status:  Inpatient    1960 MRN [id number]   [facility] 5NW-A Attending Kamilla Castillo MD   Hosp Day # 1 PCP Red Toney     Chief Complaint: vomiting  Subjective: no cellulitis  1. Has not been able to take her ABX at home  2. Ancef for now  4. Leukocytosis-improving  1. Possible due to dehydration  5. Hyperkalemia-resolved  6. Cerebral Palsy  7. LE edema  1. Chronic  8. Metabolic acidosis-resolved  9.  Deconditioning

## 2017-03-23 NOTE — OCCUPATIONAL THERAPY NOTE
Pt is well known from multiple previous admissions. Pt is extremely limited in terms of functional mobility and ADL due to contractures.  As recommended last admission, pt would greatly benefit from a LTC placement where she has the 24 hour support and assi

## 2017-03-23 NOTE — PROGRESS NOTES
[facility] Pharmacy Note: Renal dose adjustment for Famotidine (Pepcid)  Jason Patel has been prescribed Famotidine (Pepcid) 20 mg every 12 hours. Estimated Creatinine Clearance: 23.4 mL/min (based on Cr of 1.93).     Her calculated creatinine cleara

## 2017-03-23 NOTE — PHYSICAL THERAPY NOTE
Pt known to writer from previous hospital admits including recent 3/6 admit. Pt requires a kong lift for all mobility including after dc from SNF.   Pt is w/c bound- uses electric w/c to mobilize.  Pt states does not sleep in a bed, only stays in w/c.  Pt

## 2017-03-23 NOTE — ED NOTES
Critical care/PICC team unable to place PICC line after multiple attempts. Right arm has no available access at this time, they were able to get great blood return but unable to thread the cannula.   Pt will need to go to IR for additional access if needed

## 2017-03-23 NOTE — H&P
[facility] HOSPITALIST  History and Physical     Ethyl Lacks Patient Status:  Inpatient    1960 MRN [id number]   [facility] 5NW-A Attending Rk Hawkins MD   Hosp Day # 0 PCP Amy Mejia     Chief Complaint: Patient presents w the HPI. Physical Exam:    /75 mmHg  Pulse 86  Temp(Src) 98.5 °F (36.9 °C) (Oral)  Resp 14  Ht 152.4 cm (5')  Wt 134 lb (60.782 kg)  BMI 26.17 kg/m2  SpO2 99%  General: No acute distress. Alert and oriented x 3. HEENT: Normocephalic atraumatic.  Julia Temple status: full  · Bunch: no    Plan of care discussed with the ER physician    Porfirio Sicard, MD  3/22/2017

## 2017-03-23 NOTE — PROGRESS NOTES
Pharmacy Note: Renal dose adjustment of Ancef    Jason Patel is a 64year old female who has been prescribed Ancef. CrCl is 23.4 ml/min so the dose has been adjusted  to 1gm IV q12h per hospital renal dose adjustment protocol.   Pharmacy will follow an

## 2017-03-23 NOTE — PROGRESS NOTES
Patient presented supine in bed; VSS and agreeable to participate. Performed BUE/BLE restorative PROM regimen in all planes as recommended by PT/OT. Upon completion, patient made comfortable in bed with call light in reach.

## 2017-03-24 VITALS
HEIGHT: 60 IN | OXYGEN SATURATION: 100 % | HEART RATE: 76 BPM | BODY MASS INDEX: 26.31 KG/M2 | WEIGHT: 134 LBS | DIASTOLIC BLOOD PRESSURE: 63 MMHG | RESPIRATION RATE: 16 BRPM | TEMPERATURE: 98 F | SYSTOLIC BLOOD PRESSURE: 106 MMHG

## 2017-03-24 PROCEDURE — 99239 HOSP IP/OBS DSCHRG MGMT >30: CPT | Performed by: HOSPITALIST

## 2017-03-24 RX ORDER — FUROSEMIDE 40 MG/1
40 TABLET ORAL DAILY
Qty: 1 TABLET | Refills: 0 | Status: SHIPPED
Start: 2017-03-29 | End: 2018-07-21

## 2017-03-24 RX ORDER — CEPHALEXIN 500 MG/1
500 CAPSULE ORAL EVERY 8 HOURS SCHEDULED
Status: DISCONTINUED | OUTPATIENT
Start: 2017-03-24 | End: 2017-03-24

## 2017-03-24 RX ORDER — CEPHALEXIN 500 MG/1
500 CAPSULE ORAL EVERY 12 HOURS SCHEDULED
Qty: 10 CAPSULE | Refills: 0 | Status: SHIPPED | OUTPATIENT
Start: 2017-03-24 | End: 2017-03-29

## 2017-03-24 RX ORDER — CEPHALEXIN 500 MG/1
500 CAPSULE ORAL EVERY 12 HOURS SCHEDULED
Status: DISCONTINUED | OUTPATIENT
Start: 2017-03-24 | End: 2017-03-24

## 2017-03-24 RX ORDER — POLYETHYLENE GLYCOL 3350 17 G/17G
17 POWDER, FOR SOLUTION ORAL DAILY PRN
Qty: 10 EACH | Refills: 11 | Status: SHIPPED | OUTPATIENT
Start: 2017-03-24 | End: 2018-10-25 | Stop reason: CLARIF

## 2017-03-24 NOTE — PLAN OF CARE
GASTROINTESTINAL - ADULT    • Minimal or absence of nausea and vomiting Progressing    • Maintains or returns to baseline bowel function Progressing          SKIN/TISSUE INTEGRITY - ADULT    • Skin integrity remains intact Progressing    • Incision(s), wou

## 2017-03-24 NOTE — PROGRESS NOTES
Vitals stable; abdomen benign; says she feels better; not much in way of signs of cellulitis in legs. Can d/c today if tolerates oral abx and oral diet.     Cecil Roth MD  [facility]  Internal Medicine Hospitalist  Pager [phone]

## 2017-03-24 NOTE — PROGRESS NOTES
NURSING DISCHARGE NOTE    Discharged Home via Ambulance. Accompanied by Support staff  Belongings Taken by patient/family.     Patient discharged home; per pt, her sister is waiting there for her and will be with her until tomorrow, when home health ca

## 2017-03-24 NOTE — CM/SW NOTE
Received call from [address] [place] [address] inquiring about d/c date, anticipate today if tolerates diet and oral abx per MD note, will upload AVS when available. Plan start of care on Sunday, 3/26. Unit SW updated.     Carl Sheehan RN,   Phone [phone]  P

## 2017-03-25 NOTE — PROGRESS NOTES
Called and informed her to stop taking her potassium supplements for now, until she can be seen by dr. Sheila Stratton again. She expressed understanidng.

## 2017-03-25 NOTE — DISCHARGE SUMMARY
[facility] HOSPITALIST  DISCHARGE SUMMARY     Marshell Severs Patient Status:  Inpatient    1960 MRN [id number]   [facility] 5NW-A Attending No att. providers found   Hosp Day # 2 PCP Lisa Waite     Date of Admission: 3/22/2017  Date oral keflex and oral diet without difficulty. Did not produce bm but had no abd pain or distention. Prn laxatives prescribed on d/c. No complications.     Procedures during hospitalization:   • none    Incidental or significant findings and recommendatio ER 20 MEQ Tbcr   Commonly known as:  K-DUR M20        Take 20 mEq by mouth daily.     Refills:  0            Where to Get Your Medications      Please  your prescriptions at the location directed by your doctor or nurse     Bring a paper prescription

## 2017-03-27 NOTE — CM/SW NOTE
Patient discharged on 03/24/2017 as previously planned.          03/27/17 0800   Discharge disposition   Discharged to: Home-Health   Name of Facillity/Home Care/Hospice ([facility])   Home services after discharge Skilled home care   Discharge transportati

## 2017-03-28 ENCOUNTER — APPOINTMENT (OUTPATIENT)
Dept: GENERAL RADIOLOGY | Facility: HOSPITAL | Age: 57
End: 2017-03-28
Attending: EMERGENCY MEDICINE
Payer: MEDICARE

## 2017-03-28 ENCOUNTER — HOSPITAL ENCOUNTER (EMERGENCY)
Facility: HOSPITAL | Age: 57
Discharge: HOME | End: 2017-03-29
Attending: EMERGENCY MEDICINE
Payer: MEDICARE

## 2017-03-28 DIAGNOSIS — R07.89 CHEST WALL PAIN: Primary | ICD-10-CM

## 2017-03-28 DIAGNOSIS — R50.9 FEVER, UNSPECIFIED FEVER CAUSE: ICD-10-CM

## 2017-03-28 DIAGNOSIS — B34.9 VIRAL SYNDROME: ICD-10-CM

## 2017-03-28 LAB
ALBUMIN SERPL-MCNC: 2.8 G/DL (ref 3.5–4.8)
ALP LIVER SERPL-CCNC: 121 U/L (ref 46–118)
ALT SERPL-CCNC: 18 U/L (ref 14–54)
AST SERPL-CCNC: 21 U/L (ref 15–41)
BASOPHILS # BLD AUTO: 0.04 X10(3) UL (ref 0–0.1)
BASOPHILS NFR BLD AUTO: 0.3 %
BILIRUB SERPL-MCNC: 0.5 MG/DL (ref 0.1–2)
BUN BLD-MCNC: 16 MG/DL (ref 8–20)
CALCIUM BLD-MCNC: 9 MG/DL (ref 8.3–10.3)
CHLORIDE: 102 MMOL/L (ref 101–111)
CO2: 22 MMOL/L (ref 22–32)
CREAT BLD-MCNC: 0.65 MG/DL (ref 0.55–1.02)
EOSINOPHIL # BLD AUTO: 0.08 X10(3) UL (ref 0–0.3)
EOSINOPHIL NFR BLD AUTO: 0.6 %
ERYTHROCYTE [DISTWIDTH] IN BLOOD BY AUTOMATED COUNT: 12.7 % (ref 11.5–16)
GLUCOSE BLD-MCNC: 181 MG/DL (ref 70–99)
HCT VFR BLD AUTO: 40 % (ref 34–50)
HGB BLD-MCNC: 13.2 G/DL (ref 12–16)
IMMATURE GRANULOCYTE COUNT: 0.05 X10(3) UL (ref 0–1)
IMMATURE GRANULOCYTE RATIO %: 0.4 %
LACTIC ACID: 1.2 MMOL/L (ref 0.5–2)
LYMPHOCYTES # BLD AUTO: 1.2 X10(3) UL (ref 0.9–4)
LYMPHOCYTES NFR BLD AUTO: 9.4 %
M PROTEIN MFR SERPL ELPH: 7.9 G/DL (ref 6.1–8.3)
MCH RBC QN AUTO: 29.5 PG (ref 27–33.2)
MCHC RBC AUTO-ENTMCNC: 33 G/DL (ref 31–37)
MCV RBC AUTO: 89.5 FL (ref 81–100)
MONOCYTES # BLD AUTO: 1.22 X10(3) UL (ref 0.1–0.6)
MONOCYTES NFR BLD AUTO: 9.5 %
NEUTROPHIL ABS PRELIM: 10.19 X10 (3) UL (ref 1.3–6.7)
NEUTROPHILS # BLD AUTO: 10.19 X10(3) UL (ref 1.3–6.7)
NEUTROPHILS NFR BLD AUTO: 79.8 %
PLATELET # BLD AUTO: 178 10(3)UL (ref 150–450)
POTASSIUM SERPL-SCNC: 4.6 MMOL/L (ref 3.6–5.1)
RBC # BLD AUTO: 4.47 X10(6)UL (ref 3.8–5.1)
RED CELL DISTRIBUTION WIDTH-SD: 41.5 FL (ref 35.1–46.3)
SODIUM SERPL-SCNC: 132 MMOL/L (ref 136–144)
WBC # BLD AUTO: 12.8 X10(3) UL (ref 4–13)

## 2017-03-28 PROCEDURE — 71020 XR CHEST PA + LAT CHEST (CPT=71020): CPT

## 2017-03-28 PROCEDURE — 99285 EMERGENCY DEPT VISIT HI MDM: CPT

## 2017-03-28 PROCEDURE — 87633 RESP VIRUS 12-25 TARGETS: CPT | Performed by: EMERGENCY MEDICINE

## 2017-03-28 PROCEDURE — 80053 COMPREHEN METABOLIC PANEL: CPT | Performed by: EMERGENCY MEDICINE

## 2017-03-28 PROCEDURE — 85025 COMPLETE CBC W/AUTO DIFF WBC: CPT | Performed by: EMERGENCY MEDICINE

## 2017-03-28 PROCEDURE — 87040 BLOOD CULTURE FOR BACTERIA: CPT | Performed by: EMERGENCY MEDICINE

## 2017-03-28 PROCEDURE — 87581 M.PNEUMON DNA AMP PROBE: CPT | Performed by: EMERGENCY MEDICINE

## 2017-03-28 PROCEDURE — 99284 EMERGENCY DEPT VISIT MOD MDM: CPT

## 2017-03-28 PROCEDURE — 36415 COLL VENOUS BLD VENIPUNCTURE: CPT

## 2017-03-28 PROCEDURE — 87486 CHLMYD PNEUM DNA AMP PROBE: CPT | Performed by: EMERGENCY MEDICINE

## 2017-03-28 PROCEDURE — 87999 UNLISTED MICROBIOLOGY PX: CPT

## 2017-03-28 PROCEDURE — 87798 DETECT AGENT NOS DNA AMP: CPT | Performed by: EMERGENCY MEDICINE

## 2017-03-28 PROCEDURE — 83605 ASSAY OF LACTIC ACID: CPT | Performed by: EMERGENCY MEDICINE

## 2017-03-29 VITALS
WEIGHT: 133.81 LBS | OXYGEN SATURATION: 95 % | HEART RATE: 83 BPM | DIASTOLIC BLOOD PRESSURE: 68 MMHG | SYSTOLIC BLOOD PRESSURE: 110 MMHG | RESPIRATION RATE: 19 BRPM | TEMPERATURE: 101 F | HEIGHT: 59 IN | BODY MASS INDEX: 26.98 KG/M2

## 2017-03-29 LAB
CLARITY UR REFRACT.AUTO: CLEAR
COLOR UR AUTO: YELLOW
GLUCOSE UR STRIP.AUTO-MCNC: NEGATIVE MG/DL
LEUKOCYTE ESTERASE UR QL STRIP.AUTO: NEGATIVE
NITRITE UR QL STRIP.AUTO: NEGATIVE
PH UR STRIP.AUTO: 5.5 [PH] (ref 4.5–8)
RBC UR QL AUTO: NEGATIVE
RESPIRATORY PANEL NEG:: NEGATIVE
SP GR UR STRIP.AUTO: 1.02 (ref 1–1.03)
UROBILINOGEN UR STRIP.AUTO-MCNC: 0.2 MG/DL
YEAST URINE: PRESENT

## 2017-03-29 PROCEDURE — 81003 URINALYSIS AUTO W/O SCOPE: CPT | Performed by: EMERGENCY MEDICINE

## 2017-03-29 RX ORDER — OSELTAMIVIR PHOSPHATE 75 MG/1
75 CAPSULE ORAL 2 TIMES DAILY
Qty: 10 CAPSULE | Refills: 0 | Status: SHIPPED | OUTPATIENT
Start: 2017-03-29 | End: 2017-04-03

## 2017-03-29 NOTE — ED PROVIDER NOTES
Patient Seen in: [facility] Emergency Department    History   Patient presents with:  Dyspnea ERI SOB (respiratory)    Stated Complaint: ERI, fever    HPI    54-year-old female presents with difficulty breathing.   She reports pain along the right si Review of Systems    Positive for stated complaint: ERI, fever  Other systems are as noted in HPI. Constitutional and vital signs reviewed. All other systems reviewed and negative except as noted above.     PSFH elements reviewed from today Absolute 10.19 (*)     Monocyte Absolute 1.22 (*)     All other components within normal limits   LACTIC ACID, PLASMA - Normal   CBC WITH DIFFERENTIAL WITH PLATELET    Narrative:      The following orders were created for panel order CBC WITH DIFFERENTIAL W and possible exposure. Will treat with Tamiflu. Patient is otherwise well-appearing with no hypoxemia, hypotension. She is in no respiratory distress. Family has been contacted and will meet the patient at home.   Patient discharged with Tamiflu for pos

## 2017-03-29 NOTE — ED NOTES
PT REQUESTED WE CONTACT HER MOTHER AT HER HOME # = NO MESSAGE LEFT - NOT SET UP. PT UPDATED AND REQUESTED WE CONTACT HER SISTER.

## 2017-03-29 NOTE — ED NOTES
PT'S SISTER: HARDIK CALLED BACK AND WILL GO TO PT'S APT NOW & WAIT FOR PT TO RETURN HOME-\"HAS A KEY\".

## 2017-04-12 ENCOUNTER — APPOINTMENT (OUTPATIENT)
Dept: GENERAL RADIOLOGY | Facility: HOSPITAL | Age: 57
End: 2017-04-12
Attending: EMERGENCY MEDICINE
Payer: MEDICARE

## 2017-04-12 ENCOUNTER — APPOINTMENT (OUTPATIENT)
Dept: GENERAL RADIOLOGY | Facility: HOSPITAL | Age: 57
End: 2017-04-12
Payer: MEDICARE

## 2017-04-12 ENCOUNTER — HOSPITAL ENCOUNTER (EMERGENCY)
Facility: HOSPITAL | Age: 57
Discharge: HOME | End: 2017-04-12
Attending: EMERGENCY MEDICINE
Payer: MEDICARE

## 2017-04-12 VITALS
DIASTOLIC BLOOD PRESSURE: 89 MMHG | SYSTOLIC BLOOD PRESSURE: 132 MMHG | BODY MASS INDEX: 25.97 KG/M2 | RESPIRATION RATE: 18 BRPM | TEMPERATURE: 98 F | OXYGEN SATURATION: 100 % | HEIGHT: 60 IN | HEART RATE: 92 BPM | WEIGHT: 132.25 LBS

## 2017-04-12 DIAGNOSIS — R06.02 SHORTNESS OF BREATH: ICD-10-CM

## 2017-04-12 DIAGNOSIS — S20.211A CHEST WALL CONTUSION, RIGHT, INITIAL ENCOUNTER: Primary | ICD-10-CM

## 2017-04-12 PROCEDURE — 72072 X-RAY EXAM THORAC SPINE 3VWS: CPT

## 2017-04-12 PROCEDURE — 99284 EMERGENCY DEPT VISIT MOD MDM: CPT

## 2017-04-12 PROCEDURE — 96374 THER/PROPH/DIAG INJ IV PUSH: CPT

## 2017-04-12 PROCEDURE — 71100 X-RAY EXAM RIBS UNI 2 VIEWS: CPT

## 2017-04-12 PROCEDURE — 71010 XR CHEST AP PORTABLE  (CPT=71010): CPT

## 2017-04-12 PROCEDURE — 71101 X-RAY EXAM UNILAT RIBS/CHEST: CPT

## 2017-04-12 PROCEDURE — 93005 ELECTROCARDIOGRAM TRACING: CPT

## 2017-04-12 RX ORDER — MORPHINE SULFATE 2 MG/ML
2 INJECTION, SOLUTION INTRAMUSCULAR; INTRAVENOUS ONCE
Status: COMPLETED | OUTPATIENT
Start: 2017-04-12 | End: 2017-04-12

## 2017-04-12 NOTE — ED NOTES
Caregiver at bedside reports patient needs to return home by medical transport. EAS being arranged per unit clerk. Pt resting comfortably. Declines need for meds.

## 2017-04-12 NOTE — ED INITIAL ASSESSMENT (HPI)
Pt fell forward out of wheelchair 1 week ago. C/O rib pain with deep breaths.   SOB r/t pain noted per caregivers today

## 2017-04-14 ENCOUNTER — APPOINTMENT (OUTPATIENT)
Dept: ULTRASOUND IMAGING | Facility: HOSPITAL | Age: 57
End: 2017-04-14
Attending: EMERGENCY MEDICINE
Payer: MEDICARE

## 2017-04-14 ENCOUNTER — HOSPITAL ENCOUNTER (EMERGENCY)
Facility: HOSPITAL | Age: 57
Discharge: HOME | End: 2017-04-14
Attending: EMERGENCY MEDICINE
Payer: MEDICARE

## 2017-04-14 VITALS
WEIGHT: 132.25 LBS | OXYGEN SATURATION: 96 % | DIASTOLIC BLOOD PRESSURE: 76 MMHG | TEMPERATURE: 99 F | HEART RATE: 88 BPM | BODY MASS INDEX: 26 KG/M2 | SYSTOLIC BLOOD PRESSURE: 127 MMHG | RESPIRATION RATE: 18 BRPM

## 2017-04-14 DIAGNOSIS — R10.11 RUQ PAIN: Primary | ICD-10-CM

## 2017-04-14 PROCEDURE — 85025 COMPLETE CBC W/AUTO DIFF WBC: CPT | Performed by: EMERGENCY MEDICINE

## 2017-04-14 PROCEDURE — 83690 ASSAY OF LIPASE: CPT | Performed by: EMERGENCY MEDICINE

## 2017-04-14 PROCEDURE — 99284 EMERGENCY DEPT VISIT MOD MDM: CPT

## 2017-04-14 PROCEDURE — 80053 COMPREHEN METABOLIC PANEL: CPT | Performed by: EMERGENCY MEDICINE

## 2017-04-14 PROCEDURE — 36415 COLL VENOUS BLD VENIPUNCTURE: CPT

## 2017-04-14 PROCEDURE — 76700 US EXAM ABDOM COMPLETE: CPT

## 2017-04-14 RX ORDER — HYDROCODONE BITARTRATE AND ACETAMINOPHEN 5; 325 MG/1; MG/1
1 TABLET ORAL EVERY 4 HOURS PRN
Qty: 12 TABLET | Refills: 0 | Status: SHIPPED | OUTPATIENT
Start: 2017-04-14 | End: 2018-07-21

## 2017-04-14 NOTE — ED PROVIDER NOTES
Patient Seen in: [facility] Emergency Department    History   Patient presents with:  Abdomen/Flank Pain (GI/)    Stated Complaint: right rib pain    HPI    42-year-old female, history as noted below, here for evaluation of right upper quadrant pa otherwise stated in HPI.     Physical Exam       ED Triage Vitals   BP 04/14/17 1458 115/71 mmHg   Pulse 04/14/17 1458 92   Resp 04/14/17 1458 20   Temp 04/14/17 1458 99.2 °F (37.3 °C)   Temp src 04/14/17 1458 Temporal   SpO2 04/14/17 1458 96 %   O2 Device Please view results for these tests on the individual orders. MDM     Labs and imaging unremarkable.   I confirmed with the patient and the patient's sister and niece that the pain is not exertional.  She has not been complaining of any other ches

## 2017-04-14 NOTE — ED NOTES
Pt's care giver aware of discharge plan. Pt has no c/o.  W/f [facility] ambulance to transport home

## 2017-04-14 NOTE — ED INITIAL ASSESSMENT (HPI)
Pt was seen here 2 days ago for right rib area pain. States her doctor wanted her w/u for possible gallbladder issues. States no N/V/D. States pain is only 1/10 now  Pt states she fell out of her w/c 2 months ago and injured her ribs.

## 2017-06-09 ENCOUNTER — HOSPITAL ENCOUNTER (EMERGENCY)
Facility: HOSPITAL | Age: 57
Discharge: HOME | End: 2017-06-09
Attending: EMERGENCY MEDICINE
Payer: MEDICARE

## 2017-06-09 VITALS
HEART RATE: 82 BPM | OXYGEN SATURATION: 99 % | TEMPERATURE: 99 F | SYSTOLIC BLOOD PRESSURE: 141 MMHG | DIASTOLIC BLOOD PRESSURE: 72 MMHG | RESPIRATION RATE: 18 BRPM

## 2017-06-09 DIAGNOSIS — T14.8XXA BLISTER: Primary | ICD-10-CM

## 2017-06-09 DIAGNOSIS — I89.0 LYMPHEDEMA: ICD-10-CM

## 2017-06-09 PROCEDURE — 10060 I&D ABSCESS SIMPLE/SINGLE: CPT

## 2017-06-09 PROCEDURE — 99282 EMERGENCY DEPT VISIT SF MDM: CPT

## 2017-06-09 PROCEDURE — 99283 EMERGENCY DEPT VISIT LOW MDM: CPT

## 2017-06-09 NOTE — ED INITIAL ASSESSMENT (HPI)
Pt to ED from home per EMS with c/o bruising and swelling to area behind her left thigh. Pt states hit it on a toilet a couple of weeks ago and now it looks like her previous pressure ulcers.  Pt reports increased swelling to mayte legs, which is normal for h

## 2017-06-10 NOTE — ED PROVIDER NOTES
Patient Seen in: [facility] Emergency Department    History   Patient presents with:  Rash Skin Problem (integumentary)    Stated Complaint: pressure ulcer    HPI    Patient is a 51-year-old woman with chronic lymphedema as well as CP who presents t negative except as noted above. PSFH elements reviewed from today and agreed except as otherwise stated in HPI.     Physical Exam     ED Triage Vitals   BP 06/09/17 1401 150/85 mmHg   Pulse 06/09/17 1401 94   Resp 06/09/17 1401 16   Temp 06/09/17 1401 98

## 2018-07-21 ENCOUNTER — HOSPITAL ENCOUNTER (INPATIENT)
Facility: HOSPITAL | Age: 58
LOS: 1 days | Discharge: HOME | DRG: 603 | End: 2018-07-24
Attending: EMERGENCY MEDICINE | Admitting: INTERNAL MEDICINE
Payer: MEDICARE

## 2018-07-21 ENCOUNTER — APPOINTMENT (OUTPATIENT)
Dept: CT IMAGING | Facility: HOSPITAL | Age: 58
DRG: 603 | End: 2018-07-21
Attending: EMERGENCY MEDICINE
Payer: MEDICARE

## 2018-07-21 ENCOUNTER — APPOINTMENT (OUTPATIENT)
Dept: GENERAL RADIOLOGY | Facility: HOSPITAL | Age: 58
DRG: 603 | End: 2018-07-21
Attending: EMERGENCY MEDICINE
Payer: MEDICARE

## 2018-07-21 DIAGNOSIS — R11.2 INTRACTABLE VOMITING WITH NAUSEA, UNSPECIFIED VOMITING TYPE: Primary | ICD-10-CM

## 2018-07-21 DIAGNOSIS — R50.9 FEVER, UNSPECIFIED FEVER CAUSE: ICD-10-CM

## 2018-07-21 LAB
ALBUMIN SERPL-MCNC: 4.2 G/DL (ref 3.5–4.8)
ALBUMIN/GLOB SERPL: 1.1 {RATIO} (ref 1–2)
ALP LIVER SERPL-CCNC: 93 U/L (ref 46–118)
ALT SERPL-CCNC: 22 U/L (ref 14–54)
ANION GAP SERPL CALC-SCNC: 7 MMOL/L (ref 0–18)
AST SERPL-CCNC: 17 U/L (ref 15–41)
BASOPHILS # BLD AUTO: 0.03 X10(3) UL (ref 0–0.1)
BASOPHILS NFR BLD AUTO: 0.2 %
BILIRUB SERPL-MCNC: 0.4 MG/DL (ref 0.1–2)
BILIRUB UR QL STRIP.AUTO: NEGATIVE
BUN BLD-MCNC: 18 MG/DL (ref 8–20)
BUN/CREAT SERPL: 34.6 (ref 10–20)
CALCIUM BLD-MCNC: 9.4 MG/DL (ref 8.3–10.3)
CHLORIDE SERPL-SCNC: 107 MMOL/L (ref 101–111)
CLARITY UR REFRACT.AUTO: CLEAR
CO2 SERPL-SCNC: 25 MMOL/L (ref 22–32)
COLOR UR AUTO: YELLOW
CREAT BLD-MCNC: 0.52 MG/DL (ref 0.55–1.02)
EOSINOPHIL # BLD AUTO: 0.07 X10(3) UL (ref 0–0.3)
EOSINOPHIL NFR BLD AUTO: 0.6 %
ERYTHROCYTE [DISTWIDTH] IN BLOOD BY AUTOMATED COUNT: 12.8 % (ref 11.5–16)
GLOBULIN PLAS-MCNC: 4 G/DL (ref 2.5–3.7)
GLUCOSE BLD-MCNC: 93 MG/DL (ref 70–99)
GLUCOSE UR STRIP.AUTO-MCNC: NEGATIVE MG/DL
HCT VFR BLD AUTO: 44.6 % (ref 34–50)
HGB BLD-MCNC: 14.8 G/DL (ref 12–16)
IMMATURE GRANULOCYTE COUNT: 0.04 X10(3) UL (ref 0–1)
IMMATURE GRANULOCYTE RATIO %: 0.3 %
KETONES UR STRIP.AUTO-MCNC: NEGATIVE MG/DL
LACTIC ACID: 1 MMOL/L (ref 0.5–2)
LEUKOCYTE ESTERASE UR QL STRIP.AUTO: NEGATIVE
LYMPHOCYTES # BLD AUTO: 0.62 X10(3) UL (ref 0.9–4)
LYMPHOCYTES NFR BLD AUTO: 4.9 %
M PROTEIN MFR SERPL ELPH: 8.2 G/DL (ref 6.1–8.3)
MCH RBC QN AUTO: 30.3 PG (ref 27–33.2)
MCHC RBC AUTO-ENTMCNC: 33.2 G/DL (ref 31–37)
MCV RBC AUTO: 91.2 FL (ref 81–100)
MONOCYTES # BLD AUTO: 0.59 X10(3) UL (ref 0.1–1)
MONOCYTES NFR BLD AUTO: 4.7 %
NEUTROPHIL ABS PRELIM: 11.23 X10 (3) UL (ref 1.3–6.7)
NEUTROPHILS # BLD AUTO: 11.23 X10(3) UL (ref 1.3–6.7)
NEUTROPHILS NFR BLD AUTO: 89.3 %
NITRITE UR QL STRIP.AUTO: NEGATIVE
OSMOLALITY SERPL CALC.SUM OF ELEC: 290 MOSM/KG (ref 275–295)
PH UR STRIP.AUTO: 7 [PH] (ref 4.5–8)
PLATELET # BLD AUTO: 139 10(3)UL (ref 150–450)
POTASSIUM SERPL-SCNC: 4.3 MMOL/L (ref 3.6–5.1)
PROCALCITONIN SERPL-MCNC: 1.26 NG/ML
PROT UR STRIP.AUTO-MCNC: NEGATIVE MG/DL
RBC # BLD AUTO: 4.89 X10(6)UL (ref 3.8–5.1)
RBC UR QL AUTO: NEGATIVE
RED CELL DISTRIBUTION WIDTH-SD: 42.2 FL (ref 35.1–46.3)
SODIUM SERPL-SCNC: 139 MMOL/L (ref 136–144)
SP GR UR STRIP.AUTO: 1.02 (ref 1–1.03)
UROBILINOGEN UR STRIP.AUTO-MCNC: <2 MG/DL
WBC # BLD AUTO: 12.6 X10(3) UL (ref 4–13)

## 2018-07-21 PROCEDURE — 99219 INITIAL OBSERVATION CARE,LEVL II: CPT | Performed by: INTERNAL MEDICINE

## 2018-07-21 PROCEDURE — 71045 X-RAY EXAM CHEST 1 VIEW: CPT | Performed by: EMERGENCY MEDICINE

## 2018-07-21 PROCEDURE — 74177 CT ABD & PELVIS W/CONTRAST: CPT | Performed by: EMERGENCY MEDICINE

## 2018-07-21 RX ORDER — HEPARIN SODIUM 5000 [USP'U]/ML
5000 INJECTION, SOLUTION INTRAVENOUS; SUBCUTANEOUS EVERY 8 HOURS SCHEDULED
Status: DISCONTINUED | OUTPATIENT
Start: 2018-07-21 | End: 2018-07-24

## 2018-07-21 RX ORDER — KETOROLAC TROMETHAMINE 30 MG/ML
15 INJECTION, SOLUTION INTRAMUSCULAR; INTRAVENOUS ONCE
Status: COMPLETED | OUTPATIENT
Start: 2018-07-21 | End: 2018-07-21

## 2018-07-21 RX ORDER — ACETAMINOPHEN 325 MG/1
650 TABLET ORAL EVERY 6 HOURS PRN
Status: DISCONTINUED | OUTPATIENT
Start: 2018-07-21 | End: 2018-07-24

## 2018-07-21 RX ORDER — KETOROLAC TROMETHAMINE 30 MG/ML
INJECTION, SOLUTION INTRAMUSCULAR; INTRAVENOUS
Status: DISPENSED
Start: 2018-07-21 | End: 2018-07-22

## 2018-07-21 RX ORDER — CALCITRIOL 0.25 UG/1
0.25 CAPSULE, LIQUID FILLED ORAL DAILY
Status: DISCONTINUED | OUTPATIENT
Start: 2018-07-21 | End: 2018-07-24

## 2018-07-21 RX ORDER — SODIUM CHLORIDE 9 MG/ML
INJECTION, SOLUTION INTRAVENOUS CONTINUOUS
Status: CANCELLED | OUTPATIENT
Start: 2018-07-21 | End: 2018-07-21

## 2018-07-21 RX ORDER — PHENYTOIN SODIUM 100 MG/1
300 CAPSULE, EXTENDED RELEASE ORAL DAILY
Status: DISCONTINUED | OUTPATIENT
Start: 2018-07-22 | End: 2018-07-24

## 2018-07-21 RX ORDER — ONDANSETRON 2 MG/ML
4 INJECTION INTRAMUSCULAR; INTRAVENOUS EVERY 4 HOURS PRN
Status: CANCELLED | OUTPATIENT
Start: 2018-07-21

## 2018-07-21 RX ORDER — ONDANSETRON 2 MG/ML
4 INJECTION INTRAMUSCULAR; INTRAVENOUS EVERY 6 HOURS PRN
Status: DISCONTINUED | OUTPATIENT
Start: 2018-07-21 | End: 2018-07-24

## 2018-07-21 RX ORDER — ONDANSETRON 2 MG/ML
INJECTION INTRAMUSCULAR; INTRAVENOUS
Status: DISPENSED
Start: 2018-07-21 | End: 2018-07-22

## 2018-07-21 RX ORDER — ONDANSETRON 2 MG/ML
4 INJECTION INTRAMUSCULAR; INTRAVENOUS ONCE
Status: COMPLETED | OUTPATIENT
Start: 2018-07-21 | End: 2018-07-21

## 2018-07-21 RX ORDER — METOCLOPRAMIDE HYDROCHLORIDE 5 MG/ML
10 INJECTION INTRAMUSCULAR; INTRAVENOUS EVERY 8 HOURS PRN
Status: DISCONTINUED | OUTPATIENT
Start: 2018-07-21 | End: 2018-07-24

## 2018-07-21 RX ORDER — SODIUM CHLORIDE 9 MG/ML
INJECTION, SOLUTION INTRAVENOUS CONTINUOUS
Status: DISCONTINUED | OUTPATIENT
Start: 2018-07-21 | End: 2018-07-24

## 2018-07-21 NOTE — ED INITIAL ASSESSMENT (HPI)
Patient woke up today feeling normal. She suddenly got the chills and had one episode of emesis. Pt denies nausea, denies CP, denies SOB. She does feel lightheaded. Fever of 102 tympanically and BS 68 per EMS. Pt lives alone, has a caretaker.  Hx of cerebra

## 2018-07-21 NOTE — ED PROVIDER NOTES
Patient Seen in: [facility] Emergency Department    History   Patient presents with:  Fever (infectious)  Dizziness (neurologic)    Stated Complaint:     HPI    Patient is a pleasant 26-year-old female, presenting for evaluation of subjective fevers without evidence of trauma. Extraocular muscles are intact. Pupils are equal and reactive to light. Oropharynx is pink and moist.  NECK: Neck is supple and nontender. The trachea is midline.    LUNGS: Lungs are clear to auscultation bilaterally, respiration 4/12/2017, 10:28. EDWARD , XR CHEST AP PORTABLE  (CPT=71010), 4/12/2017, 9:39. INDICATIONS:  Fever/Chills  PATIENT STATED HISTORY: (As transcribed by Technologist)  Patient suddenly got the chills and had one episode of emesis this afternoon.  Fever of 10 There is hyperdense debris within the proximal appendix, however, there are no signs of acute appendicitis. ABDOMINAL WALL:  Unremarkable. PELVIC ORGANS:  Unremarkable.  LYMPH NODES:  There are mildly enlarged lymph nodes within the left inguinal region and continues to have significant nausea and retching. CT of the abdomen and pelvis was ordered. Results of the abdominal CT were reviewed and discussed with the patient.   Patient requires caregiver assistance during the day and has significantly limited m

## 2018-07-21 NOTE — H&P
[facility] HOSPITALIST  History and Physical     Noris Prieto Patient Status:  Emergency    1960 MRN [id number]   Location [address] [facility] Attending Voncille Hash, Foy Jeans, MD   Hosp Day # 0 PCP Vivek Barriga     Chief Complaint Take 20 mEq by mouth daily. Disp:  Rfl:    calciTRIOL 0.25 MCG Oral Cap Take 0.25 mcg by mouth daily. Disp:  Rfl:    famoTIDine 20 MG Oral Tab Take 20 mg by mouth 2 (two) times daily as needed for Heartburn.  Disp:  Rfl:    Phenytoin Sodium Extended 100 MG PCT  2. Afebrile here  3. CT A/P reviewed, HAYDEN noted will need followup  4. Monitor fever curve  5. ? Viral in nature given exposure  2. N/V  1. Continue IVF   2. Anti emetics  3. Cerebral Palsy  4. Seizure disorder  1. Continue home meds  2.  Seizure preca

## 2018-07-22 LAB
ANION GAP SERPL CALC-SCNC: 8 MMOL/L (ref 0–18)
BASOPHILS # BLD AUTO: 0.03 X10(3) UL (ref 0–0.1)
BASOPHILS NFR BLD AUTO: 0.2 %
BUN BLD-MCNC: 16 MG/DL (ref 8–20)
BUN/CREAT SERPL: 32 (ref 10–20)
CALCIUM BLD-MCNC: 8.1 MG/DL (ref 8.3–10.3)
CHLORIDE SERPL-SCNC: 109 MMOL/L (ref 101–111)
CO2 SERPL-SCNC: 25 MMOL/L (ref 22–32)
CREAT BLD-MCNC: 0.5 MG/DL (ref 0.55–1.02)
EOSINOPHIL # BLD AUTO: 0 X10(3) UL (ref 0–0.3)
EOSINOPHIL NFR BLD AUTO: 0 %
ERYTHROCYTE [DISTWIDTH] IN BLOOD BY AUTOMATED COUNT: 13.1 % (ref 11.5–16)
GLUCOSE BLD-MCNC: 107 MG/DL (ref 70–99)
HCT VFR BLD AUTO: 36.5 % (ref 34–50)
HGB BLD-MCNC: 11.8 G/DL (ref 12–16)
IMMATURE GRANULOCYTE COUNT: 0.12 X10(3) UL (ref 0–1)
IMMATURE GRANULOCYTE RATIO %: 0.8 %
LYMPHOCYTES # BLD AUTO: 0.51 X10(3) UL (ref 0.9–4)
LYMPHOCYTES NFR BLD AUTO: 3.5 %
MCH RBC QN AUTO: 29.2 PG (ref 27–33.2)
MCHC RBC AUTO-ENTMCNC: 32.3 G/DL (ref 31–37)
MCV RBC AUTO: 90.3 FL (ref 81–100)
MONOCYTES # BLD AUTO: 0.37 X10(3) UL (ref 0.1–1)
MONOCYTES NFR BLD AUTO: 2.5 %
NEUTROPHIL ABS PRELIM: 13.6 X10 (3) UL (ref 1.3–6.7)
NEUTROPHILS # BLD AUTO: 13.6 X10(3) UL (ref 1.3–6.7)
NEUTROPHILS NFR BLD AUTO: 93 %
OSMOLALITY SERPL CALC.SUM OF ELEC: 296 MOSM/KG (ref 275–295)
PLATELET # BLD AUTO: 112 10(3)UL (ref 150–450)
POTASSIUM SERPL-SCNC: 3.4 MMOL/L (ref 3.6–5.1)
POTASSIUM SERPL-SCNC: 4.4 MMOL/L (ref 3.6–5.1)
RBC # BLD AUTO: 4.04 X10(6)UL (ref 3.8–5.1)
RED CELL DISTRIBUTION WIDTH-SD: 43.3 FL (ref 35.1–46.3)
SODIUM SERPL-SCNC: 142 MMOL/L (ref 136–144)
WBC # BLD AUTO: 14.6 X10(3) UL (ref 4–13)

## 2018-07-22 PROCEDURE — 99226 SUBSEQUENT OBSERVATION CARE: CPT | Performed by: INTERNAL MEDICINE

## 2018-07-22 RX ORDER — CEFAZOLIN SODIUM/WATER 2 G/20 ML
2 SYRINGE (ML) INTRAVENOUS EVERY 8 HOURS
Status: DISCONTINUED | OUTPATIENT
Start: 2018-07-23 | End: 2018-07-24

## 2018-07-22 RX ORDER — POTASSIUM CHLORIDE 20 MEQ/1
40 TABLET, EXTENDED RELEASE ORAL EVERY 4 HOURS
Status: COMPLETED | OUTPATIENT
Start: 2018-07-22 | End: 2018-07-22

## 2018-07-22 NOTE — PLAN OF CARE
DISCHARGE PLANNING    • Discharge to home or other facility with appropriate resources Not Progressing        Patient/Family Goals    • Patient/Family Long Term Goal Not Progressing          GASTROINTESTINAL - ADULT    • Minimal or absence of nausea and vo

## 2018-07-22 NOTE — PROGRESS NOTES
[facility] HOSPITALIST  Progress Note     Kirstie Angeles Patient Status:  Observation    1960 MRN [id number]   [facility] 3NW-A Attending David Lugo MD   Hosp Day # 0 PCP Natasha Hess     Chief Complaint: N/V, fever    S: Patie (Porcine)  5,000 Units Subcutaneous Q8H [facility]       ASSESSMENT / PLAN:     1. Fever  1. Afebrile here  2. PCT elevated but unclear source, ? cellulitis  3. Afebrile here  4. ID to eval  5. CT A/P reviewed, HAYDEN noted will need followup  6.  Monitor fever curve

## 2018-07-22 NOTE — PROGRESS NOTES
Pharmacy Note: Renal dose adjustment of Cefazolin    Vy Madrigal is a 62year old female who has been prescribed Cefazolin 2 gram IV every 8 hr.   CrCl is 89.2 ml/min and the weight is 68 kg so the dose has been adjusted  to Cefazolin 1 gram IV every 6

## 2018-07-22 NOTE — CONSULTS
INFECTIOUS DISEASE CONSULT NOTE    Cecilia Moreau Patient Status:  Observation    1960 MRN [id number]   [facility] 3NW-A Attending Tyrese Dhaliwal MD   Hosp Day # 0 PCP [facility] (DILANTIN) ER cap 300 mg, 300 mg, Oral, Daily  •  0.9%  NaCl infusion, , Intravenous, Continuous  •  Heparin Sodium (Porcine) 5000 UNIT/ML injection 5,000 Units, 5,000 Units, Subcutaneous, Q8H MADELIN  •  acetaminophen (TYLENOL) tab 650 mg, 650 mg, Oral, Q6H P Negative   KETUR  Negative   BLOODURINE  Negative   PHURINE  7.0   PROUR  Negative   UROBILINOGEN  <2.0   NITRITE  Negative   LEUUR  Negative         Microbiology    Reviewed in EMR,    Radiology: Xr Chest Ap Portable  (cpt=71045)    Result Date: 7/21/2018 and has a hx of cerebral palsy. CONTRAST USED:  79cc of Omnipaque 350  FINDINGS:  LUNG BASE:  Scattered atelectasis and/or scarring. LIVER:  Punctate hypodense focus within the liver, too small to accurately characterize. BILIARY:  Unremarkable.  SPLEEN: at baseline but I felt that LLE was certainly erythematous on exam, different than RLE. LAD in CT could be reactive to LLE cellulitis  -cellulitis could explain elevated PCT    2. N/v- no acute intra abd infection per CT and abd exam benign.  LFTs also nl

## 2018-07-23 PROCEDURE — 99225 SUBSEQUENT OBSERVATION CARE: CPT | Performed by: INTERNAL MEDICINE

## 2018-07-23 NOTE — PROGRESS NOTES
HAD SMALL AMOUNTS (APPROXIMATELY 30 ML PER EPISODE )OF EMESIS 3-4X ,DR. DEVRIES NOTIFIED. Yuvonne Manifold TOLERATED ENSURE COMPACT AND JELLO. ASPIRATION PRECAUTIONS OBSERVED.

## 2018-07-23 NOTE — PROGRESS NOTES
[facility] HOSPITALIST  Progress Note     Cecilia Moreau Patient Status:  Observation    1960 MRN [id number]   [facility] 3NW-A Attending Tyrese Dhaliwal MD   Hosp Day # 0 PCP Ania Dubon     Chief Complaint: N/V, fever    S: Patie reviewed in Epic. Medications:   • ceFAZolin  2 g Intravenous Q8H   • calciTRIOL  0.25 mcg Oral Daily   • Phenytoin Sodium Extended  300 mg Oral Daily   • Heparin Sodium (Porcine)  5,000 Units Subcutaneous Q8H [address]       ASSESSMENT / PLAN:     1.  Fever

## 2018-07-23 NOTE — PROGRESS NOTES
[facility]  TEL: [phone]  FAX: [phone]    Montrell Antonio Patient Status:  Observation    1960 MRN [id number]   [facility] 3NW-A Attending Cyntha Leventhal, MD   Hosp Day # 0 PCP [facility] neg    Radiology: reviewed     impression    1. Acute febrile illness  -? Viral vs due to LLE cellulitis. Caregiver thinks that her L leg is at baseline but I felt that LLE was certainly erythematous on exam, different than RLE.  LAD in CT could be reactive

## 2018-07-23 NOTE — PLAN OF CARE
Assumed care of pt at 11. BP 86/43, pt asymptomatic. Per previous RN, pt has not voided since 5pm. Bladder scan shows 115cc. Dr. Yuan Etienne paged and orders received for 250cc bolus and IVF rate to 100cc/hr. Will reassess.  Redness noted to left leg, lymphedem

## 2018-07-23 NOTE — PROGRESS NOTES
7/23/18 Pt resting in bed at this time. A+Ox3. RA. CPOX on .Seizure precautions in place. Denies pain. Pt had fresh fruit for lunch and vomited clear liquids at this time. Pt states she feels better now since emesis. Incontinent in brief. IVF infusing.  Tur

## 2018-07-23 NOTE — CM/SW NOTE
18 1300   CM/SW Referral Data   Referral Source Physician   Reason for Referral Discharge planning   Informant Patient; Other  (caregiver)   Pertinent Medical Hx   Primary Care Physician Name Sada Weiner [id number]   Patient Info   Advanced directives?  No   Info a cane, and her sisters are busy with their own lives. They do check in on her occasionally. Her desire is to stay living in her condo as long as possible, and she does not wish to discuss alternative plans such as future LTC / facility living.  She does no

## 2018-07-24 VITALS
HEIGHT: 60 IN | SYSTOLIC BLOOD PRESSURE: 134 MMHG | DIASTOLIC BLOOD PRESSURE: 75 MMHG | BODY MASS INDEX: 29.45 KG/M2 | OXYGEN SATURATION: 96 % | HEART RATE: 87 BPM | TEMPERATURE: 98 F | RESPIRATION RATE: 18 BRPM | WEIGHT: 150 LBS

## 2018-07-24 LAB
BASOPHILS # BLD AUTO: 0.03 X10(3) UL (ref 0–0.1)
BASOPHILS NFR BLD AUTO: 0.4 %
EOSINOPHIL # BLD AUTO: 0.06 X10(3) UL (ref 0–0.3)
EOSINOPHIL NFR BLD AUTO: 0.8 %
ERYTHROCYTE [DISTWIDTH] IN BLOOD BY AUTOMATED COUNT: 12.6 % (ref 11.5–16)
HCT VFR BLD AUTO: 33.3 % (ref 34–50)
HGB BLD-MCNC: 10.6 G/DL (ref 12–16)
IMMATURE GRANULOCYTE COUNT: 0.04 X10(3) UL (ref 0–1)
IMMATURE GRANULOCYTE RATIO %: 0.5 %
LYMPHOCYTES # BLD AUTO: 0.86 X10(3) UL (ref 0.9–4)
LYMPHOCYTES NFR BLD AUTO: 11.7 %
MCH RBC QN AUTO: 29.9 PG (ref 27–33.2)
MCHC RBC AUTO-ENTMCNC: 31.8 G/DL (ref 31–37)
MCV RBC AUTO: 93.8 FL (ref 81–100)
MONOCYTES # BLD AUTO: 0.5 X10(3) UL (ref 0.1–1)
MONOCYTES NFR BLD AUTO: 6.8 %
NEUTROPHIL ABS PRELIM: 5.88 X10 (3) UL (ref 1.3–6.7)
NEUTROPHILS # BLD AUTO: 5.88 X10(3) UL (ref 1.3–6.7)
NEUTROPHILS NFR BLD AUTO: 79.8 %
PLATELET # BLD AUTO: 100 10(3)UL (ref 150–450)
RBC # BLD AUTO: 3.55 X10(6)UL (ref 3.8–5.1)
RED CELL DISTRIBUTION WIDTH-SD: 43.6 FL (ref 35.1–46.3)
WBC # BLD AUTO: 7.4 X10(3) UL (ref 4–13)

## 2018-07-24 PROCEDURE — 99239 HOSP IP/OBS DSCHRG MGMT >30: CPT | Performed by: INTERNAL MEDICINE

## 2018-07-24 RX ORDER — CEFADROXIL 500 MG/1
500 CAPSULE ORAL 2 TIMES DAILY
Qty: 20 CAPSULE | Refills: 0 | Status: SHIPPED | OUTPATIENT
Start: 2018-07-24 | End: 2018-08-03

## 2018-07-24 NOTE — PROGRESS NOTES
PAGED DR. DEVRIES TO INFORM HIM OF PATIENT EATING 50% OF MEAL AND FEELING READY FOR DISCHARGE. ORDERS RECEIVED.

## 2018-07-24 NOTE — PROGRESS NOTES
[facility]  TEL: [phone]  FAX: [phone]    Ethyl Lacks Patient Status:  Observation    1960 MRN [id number]   [facility] 3NW-A Attending Keith Hutchison MD   Hosp Day # 1 PCP  [facility] neg    Radiology: reviewed     impression    1. Acute febrile illness  -? Viral vs due to LLE cellulitis. Caregiver thinks that her L leg is at baseline but I felt that LLE was certainly erythematous on exam, different than RLE.  LAD in CT could be re

## 2018-07-24 NOTE — PROGRESS NOTES
NURSING DISCHARGE NOTE    Discharged Home via Ambulance. Accompanied by Support staff  Belongings Taken by patient/family. PATIENT DISCHARGED HOME IN STABLE CONDITION. PATIENT LEFT FLOOR PER STRETCHER AND WAS TRANSPORTED BY AMBULANCE.  HER CAREGIVER

## 2018-07-24 NOTE — PROGRESS NOTES
[facility] HOSPITALIST  Progress Note     Garrison Like Patient Status:  Observation    1960 MRN [id number]   [facility] 3NW-A Attending Darlene Melara MD   Hosp Day # 1 PCP Maribell Johnson     Chief Complaint: N/V, fever    S: Patie 168 hours. Imaging: Imaging data reviewed in Epic.     Medications:   • ceFAZolin  2 g Intravenous Q8H   • calciTRIOL  0.25 mcg Oral Daily   • Phenytoin Sodium Extended  300 mg Oral Daily   • Heparin Sodium (Porcine)  5,000 Units Subcutaneous Q8H [facility]

## 2018-07-24 NOTE — CM/SW NOTE
[facility] [phone] called to arrange ambulance for  time of 4:15. RN notified.      Yuni Hill RN, [facility]    [phone] pgr: [phone]

## 2018-07-24 NOTE — CM/SW NOTE
Care Management/BPCI:     Met with patient at bedside to explain the BPCI/Medicare program. Patient agreed with phone f/u for 3 months from [facility] after discharge from [facility].  BPCI/Medicare Letter and Brochure p

## 2018-07-24 NOTE — PLAN OF CARE
Minimal or absence of nausea and vomiting Not Progressing      Discharge to home or other facility with appropriate resources Progressing      Maintains or returns to baseline bowel function Progressing      Maintains adequate nutritional intake (undernour

## 2018-08-21 ENCOUNTER — HOSPITAL ENCOUNTER (EMERGENCY)
Facility: HOSPITAL | Age: 58
Discharge: HOME | End: 2018-08-21
Attending: EMERGENCY MEDICINE
Payer: MEDICARE

## 2018-08-21 VITALS
OXYGEN SATURATION: 99 % | RESPIRATION RATE: 18 BRPM | WEIGHT: 150 LBS | HEART RATE: 84 BPM | SYSTOLIC BLOOD PRESSURE: 162 MMHG | HEIGHT: 60 IN | TEMPERATURE: 98 F | BODY MASS INDEX: 29.45 KG/M2 | DIASTOLIC BLOOD PRESSURE: 89 MMHG

## 2018-08-21 DIAGNOSIS — T24.202A PARTIAL THICKNESS BURN OF LEFT LOWER EXTREMITY, INITIAL ENCOUNTER: Primary | ICD-10-CM

## 2018-08-21 PROCEDURE — 16020 DRESS/DEBRID P-THICK BURN S: CPT

## 2018-08-21 PROCEDURE — 99283 EMERGENCY DEPT VISIT LOW MDM: CPT

## 2018-08-21 RX ORDER — BACITRACIN, NEOMYCIN, POLYMYXIN B 400; 3.5; 5 [USP'U]/G; MG/G; [USP'U]/G
OINTMENT TOPICAL
Qty: 14 G | Refills: 0 | Status: SHIPPED | OUTPATIENT
Start: 2018-08-21 | End: 2018-10-25 | Stop reason: CLARIF

## 2018-08-21 RX ORDER — IBUPROFEN 200 MG
CAPSULE ORAL 3 TIMES DAILY
Status: DISCONTINUED | OUTPATIENT
Start: 2018-08-21 | End: 2018-08-21
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
## 2018-09-20 ENCOUNTER — HOSPITAL ENCOUNTER (INPATIENT)
Facility: HOSPITAL | Age: 58
LOS: 4 days | Discharge: HOME | DRG: 602 | End: 2018-09-25
Attending: EMERGENCY MEDICINE | Admitting: HOSPITALIST
Payer: MEDICARE

## 2018-09-20 ENCOUNTER — APPOINTMENT (OUTPATIENT)
Dept: CT IMAGING | Facility: HOSPITAL | Age: 58
DRG: 602 | End: 2018-09-20
Attending: EMERGENCY MEDICINE
Payer: MEDICARE

## 2018-09-20 ENCOUNTER — APPOINTMENT (OUTPATIENT)
Dept: GENERAL RADIOLOGY | Facility: HOSPITAL | Age: 58
DRG: 602 | End: 2018-09-20
Attending: EMERGENCY MEDICINE
Payer: MEDICARE

## 2018-09-20 DIAGNOSIS — E86.0 DEHYDRATION: Primary | ICD-10-CM

## 2018-09-20 PROBLEM — R11.2 NAUSEA AND VOMITING: Status: ACTIVE | Noted: 2017-03-22

## 2018-09-20 PROCEDURE — 74018 RADEX ABDOMEN 1 VIEW: CPT | Performed by: EMERGENCY MEDICINE

## 2018-09-20 PROCEDURE — 99223 1ST HOSP IP/OBS HIGH 75: CPT | Performed by: HOSPITALIST

## 2018-09-20 PROCEDURE — 71045 X-RAY EXAM CHEST 1 VIEW: CPT | Performed by: EMERGENCY MEDICINE

## 2018-09-20 PROCEDURE — 74176 CT ABD & PELVIS W/O CONTRAST: CPT | Performed by: EMERGENCY MEDICINE

## 2018-09-20 RX ORDER — SODIUM CHLORIDE 9 MG/ML
125 INJECTION, SOLUTION INTRAVENOUS CONTINUOUS
Status: DISCONTINUED | OUTPATIENT
Start: 2018-09-20 | End: 2018-09-23

## 2018-09-20 RX ORDER — ACETAMINOPHEN 500 MG
1000 TABLET ORAL ONCE
Status: COMPLETED | OUTPATIENT
Start: 2018-09-20 | End: 2018-09-20

## 2018-09-20 NOTE — ED PROVIDER NOTES
Patient Seen in: [facility] Emergency Department    History   Patient presents with:  Nausea/Vomiting/Diarrhea (gastrointestinal)    Stated Complaint: vomiting    HPI    51-year-old female who arrives here with complaints of vomiting.   The patient h is wet. Lips do look slightly dry  Lungs: Clear to auscultation without wheezing or retractions  Abdomen soft nontender there is no rebound no guarding  Cardiovascular: Regular without murmurs    Extremities: Good pulses bilaterally.       Neuro: Alert and disposition: 9/20/2018 10:50 PM           The EKG shows sinus tachycardia nonspecific ST changes no acute ST elevations are noted. When compared to an old EKG there is no significant changes compared to an old EKG. .  There is no acute ST elevations or isc cardiac silhouette is within normal limits. No congestion. No effusion or pneumothorax. CONCLUSION:  1. No acute intrathoracic process.      Dictated by: Elaina Lam MD on 9/20/2018 at 19:46     Approved by: Elaina Lam MD          The pa by Technologist)  Patient has vomiting, diarrhea, and nausea. FINDINGS:  LIVER:  No enlargement, atrophy, abnormal density, or significant focal lesion. BILIARY:  No visible dilatation or calcification.   PANCREAS:  No lesion, fluid collection, ductal d 9/20/2018  PROCEDURE:  XR CHEST AP PORTABLE  (CPT=71045)  TECHNIQUE:  AP chest radiograph was obtained. COMPARISON:  [facility] XR CHEST AP PORTABLE  (CPT=71045), 7/21/2018, 14:34.   INDICATIONS:  vomiting  PATIENT STATED HISTORY: (As transcribed by Abelardo Balbuena

## 2018-09-21 PROCEDURE — 99232 SBSQ HOSP IP/OBS MODERATE 35: CPT | Performed by: HOSPITALIST

## 2018-09-21 RX ORDER — MAGNESIUM OXIDE 400 MG (241.3 MG MAGNESIUM) TABLET
400 TABLET ONCE
Status: COMPLETED | OUTPATIENT
Start: 2018-09-21 | End: 2018-09-21

## 2018-09-21 RX ORDER — ACETAMINOPHEN 325 MG/1
650 TABLET ORAL EVERY 6 HOURS PRN
Status: DISCONTINUED | OUTPATIENT
Start: 2018-09-21 | End: 2018-09-25

## 2018-09-21 RX ORDER — ENOXAPARIN SODIUM 100 MG/ML
40 INJECTION SUBCUTANEOUS DAILY
Status: DISCONTINUED | OUTPATIENT
Start: 2018-09-21 | End: 2018-09-25

## 2018-09-21 RX ORDER — SODIUM CHLORIDE 9 MG/ML
INJECTION, SOLUTION INTRAVENOUS CONTINUOUS
Status: DISCONTINUED | OUTPATIENT
Start: 2018-09-21 | End: 2018-09-23

## 2018-09-21 RX ORDER — PHENYTOIN SODIUM 100 MG/1
300 CAPSULE, EXTENDED RELEASE ORAL DAILY
Status: DISCONTINUED | OUTPATIENT
Start: 2018-09-21 | End: 2018-09-25

## 2018-09-21 RX ORDER — SODIUM CHLORIDE 9 MG/ML
INJECTION, SOLUTION INTRAVENOUS CONTINUOUS
Status: ACTIVE | OUTPATIENT
Start: 2018-09-21 | End: 2018-09-21

## 2018-09-21 RX ORDER — METOCLOPRAMIDE HYDROCHLORIDE 5 MG/ML
5 INJECTION INTRAMUSCULAR; INTRAVENOUS EVERY 8 HOURS PRN
Status: DISCONTINUED | OUTPATIENT
Start: 2018-09-21 | End: 2018-09-22

## 2018-09-21 RX ORDER — ONDANSETRON 2 MG/ML
4 INJECTION INTRAMUSCULAR; INTRAVENOUS EVERY 4 HOURS PRN
Status: DISCONTINUED | OUTPATIENT
Start: 2018-09-21 | End: 2018-09-21

## 2018-09-21 RX ORDER — CEFAZOLIN SODIUM/WATER 2 G/20 ML
2 SYRINGE (ML) INTRAVENOUS EVERY 8 HOURS
Status: DISCONTINUED | OUTPATIENT
Start: 2018-09-21 | End: 2018-09-21

## 2018-09-21 RX ORDER — ONDANSETRON 2 MG/ML
8 INJECTION INTRAMUSCULAR; INTRAVENOUS EVERY 6 HOURS PRN
Status: DISCONTINUED | OUTPATIENT
Start: 2018-09-21 | End: 2018-09-25

## 2018-09-21 RX ORDER — TRAZODONE HYDROCHLORIDE 50 MG/1
50 TABLET ORAL NIGHTLY PRN
Status: DISCONTINUED | OUTPATIENT
Start: 2018-09-21 | End: 2018-09-25

## 2018-09-21 NOTE — SLP NOTE
ADULT SWALLOWING EVALUATION    ASSESSMENT    ASSESSMENT/OVERALL IMPRESSION:  Order received due to hx aspiration precautions and RN report to R/O dysphagia. Patient known to this service from previous admissions.  Last ST noted from 03/2017 recommended regu tolerate. Small Bite at a time. Compensatory Strategies Recommended: Slow rate;Small bites and sips; Pinched straw sips; Extra sauce/gravy  Aspiration Precautions: Upright position; Slow rate;Small bites and sips  Medication Administration Recommendation Elevation Coordination: Appears impaired  (Please note: Silent aspiration cannot be evaluated clinically.  Videofluoroscopic Swallow Study is required to rule-out silent aspiration.)    Esophageal Phase of Swallow: No complaints consistent with possible eso

## 2018-09-21 NOTE — PHYSICAL THERAPY NOTE
Pt known to writer from previous hospital admits. Pt requires a kogn lift for all mobility if mobilizing out of w/c.   Pt is w/c bound- uses electric w/c to mobilize.  Pt states does not sleep in a bed, only stays in w/c.  Per previous notes, pt reported

## 2018-09-21 NOTE — PROGRESS NOTES
Pharmacy Note:   Antimicrobial Weight Dose Adjustment for: Cefazolin     Criselda Helm has been prescribed Cefazolin 2 gram IV every 8 hrs. Estimated Creatinine Clearance: 74.3 mL/min (based on SCr of 0.6 mg/dL).           Weight = 66.6 kg

## 2018-09-21 NOTE — PROGRESS NOTES
Pt is a 63 y/o female admitted with dehydration,n/v.still has nausea and vomited x1 today. reglan prn given with relief. denies diarrhea,pain or SOB. pt is WC bound at home. aspiration,fall and seizure precaution. HOB elevated. bed alarm on.mild fever 100. 2.nicolase

## 2018-09-21 NOTE — PLAN OF CARE
Problem: Impaired Swallowing  Goal: Minimize aspiration risk  Interventions:  -1:1 feeder assistance  - Patient should be alert and upright for all feedings (as able to tolerate)  - Offer food and liquids at a slow rate;  Controlled straw sip drinking  - En

## 2018-09-21 NOTE — PROGRESS NOTES
09/21/18 1309   Clinical Encounter Type   Visited With Patient  (Caregiver at bedside)   Routine Visit (Responded to request for consult - Advance Directive)   Provided patient education re. [facility]. Pt.  Wanted to review form and discuss with her

## 2018-09-21 NOTE — CM/SW NOTE
Met with pt to discuss discharge planning. Pt with history of cerebral palsy currently admitted for dehydration. Pt confirmed she has caregiver and DME services in place at home. She did not express any concerns or discharge needs a this time.   Discusse

## 2018-09-21 NOTE — PLAN OF CARE
Patient/Family Goals    • Patient/Family Long Term Goal Progressing    • Patient/Family Short Term Goal Progressing        Pt alert and oriented; hx CP. O2 is WNL on RA. Pt had 1 episode of vomiting upon arriving to room. PRN Zofran given. VSS.     NSR

## 2018-09-21 NOTE — OCCUPATIONAL THERAPY NOTE
Pt known to department from previous hospital admits.  Pt requires a kong lift for all mobility if mobilizing out of w/c.  Pt is w/c bound- uses electric w/c to mobilize.  Pt states does not sleep in a bed, only stays in w/c.  Per previous notes, pt repor

## 2018-09-21 NOTE — PROGRESS NOTES
[facility] HOSPITALIST  Progress Note     Valadez Primes Patient Status:  Inpatient    1960 MRN [id number]   [facility] 5NW-A Attending Caesar Larose MD   Hosp Day # 0 PCP April Briscoe     Chief Complaint: Vomiting  S:  Still with 2. F/u Cx   2. Gastroenteritis with vomiting- anti-emetics   3. Lactic acidosis- resolved  4. Abdominal adenopathy- Oncology consulted ?bx   5. Cerebral palsy with functional quadriplegia.    6. Seizure disorder-resume antiepiletpcis- dilantin 9.5    Qual

## 2018-09-21 NOTE — H&P
[facility] HOSPITALIST  History and Physical     Giuliana Perez Patient Status:  Emergency    1960 MRN [id number]   Location [facility] [facility] Attending Jim Zavala MD   Hosp Day # 0 PCP Lindy Mcqueen     Chief Complaint: cerebral palsy with limb contractures  HEENT: Normocephalic atraumatic. Moist mucous membranes. EOM-I. PERRLA. Anicteric. Neck: No lymphadenopathy. No JVD. No carotid bruits. Respiratory: Clear to auscultation bilaterally. No wheezes. No rhonchi.   Cardio with patient and er md Ksenia Andrew MD  9/20/2018

## 2018-09-21 NOTE — PLAN OF CARE
Problem: Patient/Family Goals  Goal: Patient/Family Short Term Goal  Patient's Short Term Goal: 9/21-resolve n/v  Interventions:   - 9/21-nausea meds  - See additional Care Plan goals for specific interventions  Outcome: Progressing

## 2018-09-21 NOTE — OCCUPATIONAL THERAPY NOTE
Order for OT lymphedema consult received. Patient has chronic lymphedema and is unable to manage at home. Patient's caregiver has been trained by [facility] to complete compression bandaging in the past, however patient reports bandages do not always stay on.

## 2018-09-21 NOTE — PROGRESS NOTES
Pharmacy Note: Renal dose adjustment for Metoclopramide (Reglan)  Cal Gum has been prescribed Metoclopramide (Reglan) 10 mg every 8 hours as needed. Estimated Creatinine Clearance: 66.5 mL/min (based on SCr of 0.67 mg/dL).     Her calculated crea

## 2018-09-22 PROCEDURE — 99232 SBSQ HOSP IP/OBS MODERATE 35: CPT | Performed by: HOSPITALIST

## 2018-09-22 PROCEDURE — 99223 1ST HOSP IP/OBS HIGH 75: CPT | Performed by: INTERNAL MEDICINE

## 2018-09-22 RX ORDER — MAGNESIUM OXIDE 400 MG (241.3 MG MAGNESIUM) TABLET
400 TABLET ONCE
Status: COMPLETED | OUTPATIENT
Start: 2018-09-22 | End: 2018-09-22

## 2018-09-22 RX ORDER — METOCLOPRAMIDE HYDROCHLORIDE 5 MG/ML
10 INJECTION INTRAMUSCULAR; INTRAVENOUS EVERY 8 HOURS PRN
Status: DISCONTINUED | OUTPATIENT
Start: 2018-09-22 | End: 2018-09-25

## 2018-09-22 NOTE — PLAN OF CARE
Impaired Swallowing    • Minimize aspiration risk Progressing        MUSCULOSKELETAL - ADULT    • Return mobility to safest level of function Progressing    • Maintain proper alignment of affected body part Progressing        PAIN - ADULT    • Verbalizes/d

## 2018-09-22 NOTE — CONSULTS
[facility] Pharmacy Note:  Renal Dose Adjustment for Metoclopramide (REGLAN)    Mireya Dinh has been prescribed Metoclopramide (REGLAN) 5 mg every 8 hours as needed for nausea    Estimated Creatinine Clearance: 74.3 mL/min (based on SCr of 0.6 mg/dL).     Her

## 2018-09-22 NOTE — PROGRESS NOTES
[facility] HOSPITALIST  Progress Note     Sanchez Cast Patient Status:  Inpatient    1960 MRN [id number]   [facility] 5NW-A Attending Ezio Torrez MD   Hosp Day # 1 PCP Harpreet Craig     Chief Complaint: Vomiting  S:  Feels bett left leg doppler to r/o DVT  2. Gastroenteritis with vomiting- anti-emetics   3. Lactic acidosis- resolved  4. Abdominal adenopathy- Oncology consulted ?bx   5. Cerebral palsy with functional quadriplegia.    6. Seizure disorder-resume antiepiletpcis     Beti Shoe

## 2018-09-22 NOTE — CONSULTS
[facility]  Report of Consultation    Cal Mackey Patient Status:  Inpatient    1960 MRN [id number]   [facility] 5NW-A Attending Seven Ambrosio MD   Hosp Day # 1 PCP Lavonne Lomas     Reason for Consultation:    Evaluation visible mass. Pelvic organs appropriate for patient age. BONES: Severe rotoscoliosis of the thoracic and lumbar spine as well as dysplastic changes involving the hips in this patient who has a clinical history of cerebral palsy.   LUNG BASES: Atelectati Continuous    Review of Systems:  A 10-point review of systems was done with pertinent positives and negatives per the HPI. Physical Exam:   General: Patient is alert and not in acute distress.   Vital Signs: /74 (BP Location: Left arm)   Pulse 92 renal failure, unspecified acute renal failure type ([facility] [address]     Acute renal failure (HCC)     Intractable vomiting with nausea     Seizure (HCC)     Intractable vomiting with nausea, unspecified vomiting type     Fever, unspecified fever cause     Intractabl

## 2018-09-22 NOTE — PLAN OF CARE
Impaired Swallowing    • Minimize aspiration risk Progressing        PAIN - ADULT    • Verbalizes/displays adequate comfort level or patient's stated pain goal Progressing        RISK FOR INFECTION - ADULT    • Absence of fever/infection during anticipated

## 2018-09-23 ENCOUNTER — APPOINTMENT (OUTPATIENT)
Dept: ULTRASOUND IMAGING | Facility: HOSPITAL | Age: 58
DRG: 602 | End: 2018-09-23
Attending: HOSPITALIST
Payer: MEDICARE

## 2018-09-23 PROCEDURE — 93971 EXTREMITY STUDY: CPT | Performed by: HOSPITALIST

## 2018-09-23 PROCEDURE — 99232 SBSQ HOSP IP/OBS MODERATE 35: CPT | Performed by: INTERNAL MEDICINE

## 2018-09-23 PROCEDURE — 99233 SBSQ HOSP IP/OBS HIGH 50: CPT | Performed by: HOSPITALIST

## 2018-09-23 RX ORDER — FUROSEMIDE 10 MG/ML
40 INJECTION INTRAMUSCULAR; INTRAVENOUS ONCE
Status: COMPLETED | OUTPATIENT
Start: 2018-09-23 | End: 2018-09-23

## 2018-09-23 RX ORDER — IPRATROPIUM BROMIDE AND ALBUTEROL SULFATE 2.5; .5 MG/3ML; MG/3ML
3 SOLUTION RESPIRATORY (INHALATION) EVERY 4 HOURS PRN
Status: DISCONTINUED | OUTPATIENT
Start: 2018-09-23 | End: 2018-09-25

## 2018-09-23 NOTE — SLP NOTE
SPEECH DAILY NOTE - INPATIENT    ASSESSMENT & PLAN   ASSESSMENT  Pt seen for meal assess/dysphagia therapy to monitor po tolerance of recommended diet and ensure adherence to aspiration precautions.   RN arrived to assist SLP in repositioning pt more [place]

## 2018-09-23 NOTE — PLAN OF CARE
MUSCULOSKELETAL - ADULT    • Return mobility to safest level of function Not Progressing          Impaired Swallowing    • Minimize aspiration risk Progressing        PAIN - ADULT    • Verbalizes/displays adequate comfort level or patient's stated pain goa

## 2018-09-23 NOTE — PROGRESS NOTES
[facility]  Progress Note     Mireya Dinh Patient Status:  Inpatient    1960 MRN [id number]   [facility] 5NW-A Attending Kit MD Jesús   Hosp Day # 2 PCP Gracie Bocanegra     Chief Complaint: Vomiting  S:  Feels bett cellulitis- improving. LE doppler negative. Elevated CRP and ESR. 1. IV abx   2. Gastroenteritis with vomiting- anti-emetics- resolved  1. Stop IVF   3. Lactic acidosis- resolved  4. Abdominal adenopathy  1. Oncology consulted- appreciate input  2.  [place]oc

## 2018-09-23 NOTE — PROGRESS NOTES
Heme/Onc Progress Note - [facility]      Chief Complaint:    Follow up for abdominal/pelvic adenopathy. Interim History:      She has no new complaints. She has some persistent cough. She has no pain at this visit.     Physical Examination:    Vital Signs: edema  EXTREMITY EXAMINED:  Left lower extremity and right common femoral vein  SAPHENOFEMORAL JUNCTION:  Unremarkable without evidence of thrombus  THROMBI:  None visible. COMPRESSION:  Normal compressibility, phasicity, and augmentation.   OTHER:  Soft t

## 2018-09-24 PROCEDURE — 99232 SBSQ HOSP IP/OBS MODERATE 35: CPT | Performed by: INTERNAL MEDICINE

## 2018-09-24 PROCEDURE — 99232 SBSQ HOSP IP/OBS MODERATE 35: CPT | Performed by: HOSPITALIST

## 2018-09-24 RX ORDER — FUROSEMIDE 20 MG/1
20 TABLET ORAL DAILY
Status: DISCONTINUED | OUTPATIENT
Start: 2018-09-24 | End: 2018-09-25

## 2018-09-24 RX ORDER — POTASSIUM CHLORIDE 20 MEQ/1
40 TABLET, EXTENDED RELEASE ORAL EVERY 4 HOURS
Status: COMPLETED | OUTPATIENT
Start: 2018-09-24 | End: 2018-09-24

## 2018-09-24 NOTE — PROGRESS NOTES
[facility]  Progress Note     Noris Ra Patient Status:  Inpatient    1960 MRN [id number]   [facility] 5NW-A Attending April Woods MD   Hosp Day # 3 PCP Vivek Barriga     Chief Complaint: Vomiting  S:  Feels bett doppler negative. Elevated CRP and ESR. 1. IV abx   2. Gastroenteritis with vomiting- anti-emetics- resolved  1. Stopped IVF and tolerating diet  3. Lactic acidosis- resolved  4. Abdominal adenopathy  1. Oncology consulted- appreciate input  2.  Nonah Shoulder

## 2018-09-24 NOTE — SLP NOTE
SPEECH DAILY NOTE - INPATIENT    ASSESSMENT & PLAN   ASSESSMENT  Pt seen for meal assess/dysphagia therapy to monitor po tolerance of recommended diet and ensure adherence to aspiration precautions. Pt alert and observed self feeding breakfast items.   Pt

## 2018-09-24 NOTE — PLAN OF CARE
SKIN/TISSUE INTEGRITY - ADULT    • Skin integrity remains intact Progressing    • Incision(s), wounds(s) or drain site(s) healing without S/S of infection Progressing    • Oral mucous membranes remain intact Progressing          MUSCULOSKELETAL - ADULT fever cause     Intractable nausea and vomiting     Lymphadenopathy     Abdominal lymphadenopathy     Pelvic lymphadenopathy     Neutrophilia     SOB (shortness of breath)     Gastroenteritis       Active issue(s) requiring resolution prior to discharge: R

## 2018-09-24 NOTE — PLAN OF CARE
SKIN/TISSUE INTEGRITY - ADULT    • Skin integrity remains intact Not Progressing          PAIN - ADULT    • Verbalizes/displays adequate comfort level or patient's stated pain goal Progressing        RISK FOR INFECTION - ADULT    • Absence of fever/infecti

## 2018-09-24 NOTE — PROGRESS NOTES
Heme/Onc Progress Note - [facility]      Chief Complaint:    Follow up for abdominal/pelvic adenopathy. Interim History:      She has no new complaints. She has some persistent cough. She has no pain at this visit.     Physical Examination:    Vital Signs: high     Immunoglobulin M 43.0 - 279.0 mg/dL 112.0      Venous Doppler study of left leg:  FINDINGS:  The left posterior tibial veins are not visualized due to soft tissue edema  EXTREMITY EXAMINED:  Left lower extremity and right common femoral vein  SAPH

## 2018-09-25 VITALS
HEART RATE: 107 BPM | OXYGEN SATURATION: 96 % | DIASTOLIC BLOOD PRESSURE: 98 MMHG | SYSTOLIC BLOOD PRESSURE: 161 MMHG | HEIGHT: 60 IN | TEMPERATURE: 98 F | RESPIRATION RATE: 18 BRPM | WEIGHT: 158 LBS | BODY MASS INDEX: 31.02 KG/M2

## 2018-09-25 PROCEDURE — 99231 SBSQ HOSP IP/OBS SF/LOW 25: CPT | Performed by: INTERNAL MEDICINE

## 2018-09-25 PROCEDURE — 99239 HOSP IP/OBS DSCHRG MGMT >30: CPT | Performed by: HOSPITALIST

## 2018-09-25 RX ORDER — FUROSEMIDE 20 MG/1
20 TABLET ORAL DAILY
Qty: 30 TABLET | Refills: 0 | Status: SHIPPED | OUTPATIENT
Start: 2018-09-26 | End: 2018-10-25 | Stop reason: ALTCHOICE

## 2018-09-25 RX ORDER — CEFUROXIME AXETIL 500 MG/1
500 TABLET ORAL 2 TIMES DAILY
Qty: 10 TABLET | Refills: 0 | Status: SHIPPED | OUTPATIENT
Start: 2018-09-25 | End: 2018-09-30

## 2018-09-25 NOTE — PROGRESS NOTES
Heme/Onc Progress Note - [facility]      Chief Complaint:    Follow up for abdominal/pelvic adenopathy. Interim History:      She has no new complaints. She has some persistent cough. She has no pain at this visit.     Physical Examination:    Vital Signs: 0649   LDH 84 - 246 U/L 139       Ref Range & Units 9/22/18 0649   C-Reactive Protein <1.00 mg/dL 17.70 Abnormally high       Ref Range & Units 9/20/18 1134   Immunoglobulin A 70.00 - 312.00 mg/dL 286.00    Immunoglobulin G 791-1,643 mg/dL 1,700 Abnormally

## 2018-09-25 NOTE — CM/SW NOTE
MD orders received for discharge. VIV met with pt bedside who states she would like to call her caregiver to meet her at home and requests transport \" ASAP. \" VIV arranged BLS transport with Angelique Greer ([phone]) with request for 1:30pm transport.  Sha

## 2018-09-25 NOTE — SLP NOTE
SPEECH DAILY NOTE - INPATIENT    ASSESSMENT & PLAN   ASSESSMENT  Pt seen for meal assess/dysphagia therapy to monitor po tolerance of recommended diet and ensure adherence to aspiration precautions. Pt alert and sitting upright in bed.   Pt self feeding br

## 2018-09-25 NOTE — PLAN OF CARE
Impaired Swallowing    • Minimize aspiration risk Progressing          MUSCULOSKELETAL - ADULT    • Return mobility to safest level of function Progressing    • Maintain proper alignment of affected body part Progressing          RISK FOR INFECTION - ADULT

## 2018-09-25 NOTE — DISCHARGE SUMMARY
[facility]  DISCHARGE SUMMARY     Kirstie Angeles Patient Status:  Inpatient    1960 MRN [id number]   [facility] 5NW-A Attending Charity Lazaro MD   [facility] Day # 4 BULL Hess     Date of Admission: 2018  Date of D was reactive. However, patient did have monoclonal protein study and quantitative IgG level sent which had mild elevation IgG  The patient's cellulitis significantly improved and was converted to oral antibiotics.   Hematology recommended outpatient follow Nelly Marti, [phone], [address] [place] [address] [place]  [id number]    Phone:  [phone]   · Cefuroxime Axetil 500 MG Tabs  · furosemide 20 MG Tabs         Follow-up appointment:   No follow-up provider specified.     Vital signs:  Temp:  [98.1

## 2018-09-25 NOTE — PLAN OF CARE
Problem: Impaired Swallowing  Goal: Minimize aspiration risk  Interventions:  -Tray set up and assist pt with positioning for po.  90 degree  - Patient should be alert and upright for all feedings (as able to tolerate)  - Offer food and liquids at a slow r

## 2018-09-26 NOTE — CM/SW NOTE
09/26/18 0900   Discharge disposition   Expected discharge disposition Home or Self   Home services after discharge Other (comment)  (+ caregiver)   Discharge transportation Other (comment)  ([facility])     Patient discharged on 09/25/2018 as previou

## 2018-10-25 ENCOUNTER — HOSPITAL ENCOUNTER (INPATIENT)
Facility: HOSPITAL | Age: 58
LOS: 4 days | Discharge: HOME | DRG: 871 | End: 2018-10-29
Attending: EMERGENCY MEDICINE | Admitting: HOSPITALIST
Payer: MEDICARE

## 2018-10-25 ENCOUNTER — APPOINTMENT (OUTPATIENT)
Dept: GENERAL RADIOLOGY | Facility: HOSPITAL | Age: 58
DRG: 871 | End: 2018-10-25
Attending: EMERGENCY MEDICINE
Payer: MEDICARE

## 2018-10-25 DIAGNOSIS — L03.116 CELLULITIS OF LEFT LOWER EXTREMITY: ICD-10-CM

## 2018-10-25 DIAGNOSIS — A41.9 SEPSIS AFFECTING SKIN: ICD-10-CM

## 2018-10-25 DIAGNOSIS — R50.9 FEVER, UNSPECIFIED FEVER CAUSE: Primary | ICD-10-CM

## 2018-10-25 PROBLEM — E87.1 HYPONATREMIA: Status: ACTIVE | Noted: 2018-10-25

## 2018-10-25 PROBLEM — D72.829 LEUKOCYTOSIS: Status: ACTIVE | Noted: 2018-10-25

## 2018-10-25 PROBLEM — D69.6 THROMBOCYTOPENIA (HCC): Status: ACTIVE | Noted: 2018-10-25

## 2018-10-25 PROCEDURE — 99223 1ST HOSP IP/OBS HIGH 75: CPT | Performed by: HOSPITALIST

## 2018-10-25 PROCEDURE — 71045 X-RAY EXAM CHEST 1 VIEW: CPT | Performed by: EMERGENCY MEDICINE

## 2018-10-25 RX ORDER — ENOXAPARIN SODIUM 100 MG/ML
40 INJECTION SUBCUTANEOUS DAILY
Status: DISCONTINUED | OUTPATIENT
Start: 2018-10-25 | End: 2018-10-29

## 2018-10-25 RX ORDER — ASCORBIC ACID 500 MG
500 TABLET ORAL DAILY
COMMUNITY

## 2018-10-25 RX ORDER — ACETAMINOPHEN 325 MG/1
650 TABLET ORAL EVERY 6 HOURS PRN
Status: DISCONTINUED | OUTPATIENT
Start: 2018-10-25 | End: 2018-10-29

## 2018-10-25 RX ORDER — TRAZODONE HYDROCHLORIDE 50 MG/1
50 TABLET ORAL NIGHTLY PRN
Status: DISCONTINUED | OUTPATIENT
Start: 2018-10-25 | End: 2018-10-29

## 2018-10-25 RX ORDER — ONDANSETRON 2 MG/ML
4 INJECTION INTRAMUSCULAR; INTRAVENOUS ONCE
Status: COMPLETED | OUTPATIENT
Start: 2018-10-25 | End: 2018-10-25

## 2018-10-25 RX ORDER — ONDANSETRON 2 MG/ML
4 INJECTION INTRAMUSCULAR; INTRAVENOUS EVERY 6 HOURS PRN
Status: DISCONTINUED | OUTPATIENT
Start: 2018-10-25 | End: 2018-10-29

## 2018-10-25 RX ORDER — PHENYTOIN SODIUM 100 MG/1
300 CAPSULE, EXTENDED RELEASE ORAL DAILY
Status: DISCONTINUED | OUTPATIENT
Start: 2018-10-26 | End: 2018-10-29

## 2018-10-25 RX ORDER — MAGNESIUM GLUCONATE 30 MG(550)
1 TABLET ORAL DAILY
COMMUNITY
End: 2019-03-04 | Stop reason: CLARIF

## 2018-10-25 RX ORDER — MULTIVIT-MIN/IRON/FOLIC ACID/K 18-600-40
2000 CAPSULE ORAL DAILY
COMMUNITY

## 2018-10-25 RX ORDER — METOCLOPRAMIDE HYDROCHLORIDE 5 MG/ML
10 INJECTION INTRAMUSCULAR; INTRAVENOUS EVERY 8 HOURS PRN
Status: DISCONTINUED | OUTPATIENT
Start: 2018-10-25 | End: 2018-10-29

## 2018-10-25 RX ORDER — SODIUM CHLORIDE 9 MG/ML
INJECTION, SOLUTION INTRAVENOUS CONTINUOUS
Status: CANCELLED | OUTPATIENT
Start: 2018-10-25 | End: 2018-10-25

## 2018-10-25 RX ORDER — FUROSEMIDE 20 MG/1
20 TABLET ORAL DAILY
COMMUNITY
End: 2019-03-04 | Stop reason: CLARIF

## 2018-10-25 NOTE — ED INITIAL ASSESSMENT (HPI)
Patient states she \"feels bad, my head feels funny. \" Patient had fever per medics.  Temperature 102.7 axillary

## 2018-10-25 NOTE — PROGRESS NOTES
[facility] Pharmacy Note: Antimicrobial Dose Optimization for: vancomycin (Fidelina Thao)    Estevan Singleton is a 62year old female who has been prescribed vancomycin (VANCOCIN) 1000 mg x1 dose in the ED.       The dose was changed to a 1750mg (~25 mg/kg loading dose;

## 2018-10-25 NOTE — ED NOTES
Care of patient assumed at this time. Patient reports pain controlled to lower extremities at this time. VS obtained. IV access to right hand obtained in triage holding.

## 2018-10-25 NOTE — H&P
[facility]  History and Physical     Darryleryle Buoy Patient Status:  Emergency    1960 MRN [id number]   Location [facility] [facility] Attending Megan Hyde MD   Hosp Day # 0 PCP Red Toney     Chief Complaint: 3.  HEENT: Normocephalic atraumatic. Moist mucous membranes. EOM-I. PERRLA. Anicteric. Neck: No lymphadenopathy. No JVD. No carotid bruits. Respiratory: Clear to auscultation bilaterally. No wheezes. No rhonchi.   Cardiovascular: S1, S2. Regular rate and course  4. Seizure disorder-resume phenytoin  5.  Hyponatremia-monitor Na    Quality:  · DVT Prophylaxis: lovenox,scds  · CODE status: full  · Bunch: no    Plan of care discussed with patient and er md Ksenia Andrew MD  10/25/2018

## 2018-10-26 PROCEDURE — 99232 SBSQ HOSP IP/OBS MODERATE 35: CPT | Performed by: INTERNAL MEDICINE

## 2018-10-26 NOTE — ED NOTES
Report to Alvarado Huerta RN in ED at this time. Patient resting on cart comfortably. VSS. Waiting for admission. vanco and IV fluids infusing.

## 2018-10-26 NOTE — ED PROVIDER NOTES
Patient Seen in: [facility] 5nw-a    History   Patient presents with:  Fever (infectious)    Stated Complaint: Fever    HPI    Patient is a 43-year-old female who presents with a fever.   Patient has a history of recent hospital admission for celluli No tachypnea. Lungs clear to auscultation bilaterally without rales/rhonchi. Equal breath sounds bilaterally  Cardiac: No tachycardia. No murmurs. Regular rate and rhythm. Abdomen: Soft and nontender throughout.   No rebound or guarding  Extremities: E Patient was given broad-spectrum antibiotics as she had a recent hospital admission. She was hydrated. Urine is negative. Lactate is normal.  White count is 18.4 thousand.   Admission disposition: 10/25/2018  6:11 PM                 Disposition and Plan

## 2018-10-26 NOTE — PROGRESS NOTES
[facility]  Progress Note    Sanchez Cast Patient Status:  Inpatient    1960 MRN [id number]   [facility] 5NW-A Attending David Fonseca MD   Hosp Day # 1 PCP Harpreet Craig     CC: Fever, left leg redness    SUBJECTIVE:  Left 0.56 10/26/2018    BUN 13 10/26/2018     10/26/2018    K 3.5 10/26/2018     10/26/2018    CO2 23.0 10/26/2018    GLU 92 10/26/2018    CA 8.3 10/26/2018    ALB 2.7 10/26/2018    ALKPHO 60 10/26/2018    BILT 0.4 10/26/2018    TP 6.4 10/26/2018 extremity cellulitis-continue IV antibiotics. Follow blood culture. 2. Cerebral palsy with functional quadriplegia  3. Seizure disorder  4. Hyponatremia-improved with IV fluids  5. Hypokalemia– replace  6.  Fever and leukocytosis due to sepsis from left l

## 2018-10-26 NOTE — PLAN OF CARE
DISCHARGE PLANNING    • Discharge to home or other facility with appropriate resources Progressing        GASTROINTESTINAL - ADULT    • Minimal or absence of nausea and vomiting Progressing        Patient/Family Goals    • Patient/Family Long Term Goal Pro

## 2018-10-26 NOTE — CONSULTS
INFECTIOUS DISEASE CONSULTATION    Gera Bautista Patient Status:  Inpatient    1960 MRN [id number]   [facility] 5NW-A Attending Macirna Parks MD   Hosp Day # 1 PCP Glenis Knows months to 65 years inj 0.5ml, 0.5 mL, Intramuscular, Prior to discharge    No current facility-administered medications on file prior to encounter.    Current Outpatient Medications on File Prior to Encounter:  furosemide 20 MG Oral Tab Take 20 mg by mouth Active Problem List:     Cellulitis     Cerebral palsy ([facility] [address]     Seizure disorder ([facility] [address]     Lymphedema     At risk for falling     Contusion of leg, left     Contusion of leg     Ulcer of leg, chronic, left (HCC)     Hyperglycemia     Azotemia     Cellulit

## 2018-10-27 PROCEDURE — 99232 SBSQ HOSP IP/OBS MODERATE 35: CPT | Performed by: INTERNAL MEDICINE

## 2018-10-27 RX ORDER — POTASSIUM CHLORIDE 20 MEQ/1
40 TABLET, EXTENDED RELEASE ORAL ONCE
Status: COMPLETED | OUTPATIENT
Start: 2018-10-27 | End: 2018-10-27

## 2018-10-27 NOTE — PLAN OF CARE
A&Ox4  Speech garbled  Seizure precautions in place  Bilateral upper extremities contracted  Pt unable to move both lower extremities  Chronic lymphedema to BLE  Redness to LLE  97% on RA, no sob  PRN zofran given for nausea  x1 small amount of emesis toda

## 2018-10-27 NOTE — PROGRESS NOTES
[facility]  Progress Note    Giuliana Perez Patient Status:  Inpatient    1960 MRN [id number]   [facility] 5NW-A Attending Uvaldo Freitas MD   Hosp Day # 2 PCP Lindy Mcqueen     CC: Fever, left leg redness    SUBJECTIVE:  Left obtained. COMPARISON:  [facility] , XR CHEST AP PORTABLE  (CPT=71045), 9/20/2018, 18:59. INDICATIONS:  Fever     PATIENT STATED HISTORY: (As transcribed by Technologist)  Patient has a fever today.          FINDINGS:  Suboptimal examination secondary to Lovenox  · CODE status: full  · Bunch: no  · Central line: no    Estimated date of discharge: To be decided  Discharge is dependent on: Fever free for 24 hours, leg redness and swelling improve  At this point Ms. Tod Garcia  is expected to be discharge to:  To

## 2018-10-27 NOTE — PROGRESS NOTES
[facility]                INFECTIOUS DISEASE PROGRESS NOTE    Sanchez Cast Patient Status:  Inpatient    1960 MRN [id number]   [facility] 5NW-A Attending David Fonseca MD   [facility] Day # 2 PCP [facility] Cellulitis     Cerebral palsy (HCC)     Seizure disorder (HCC)     Lymphedema     At risk for falling     Contusion of leg, left     Contusion of leg     Ulcer of leg, chronic, left (HCC)     Hyperglycemia     Azotemia     Cellulitis of left lower extremit

## 2018-10-27 NOTE — PLAN OF CARE
SKIN/TISSUE INTEGRITY - ADULT    • Skin integrity remains intact Not Progressing    • Incision(s), wounds(s) or drain site(s) healing without S/S of infection Not Progressing          DISCHARGE PLANNING    • Discharge to home or other facility with appropr

## 2018-10-28 PROCEDURE — 99232 SBSQ HOSP IP/OBS MODERATE 35: CPT | Performed by: INTERNAL MEDICINE

## 2018-10-28 NOTE — PROGRESS NOTES
[facility]  Progress Note    Catrachita Record Patient Status:  Inpatient    1960 MRN [id number]   [facility] 5NW-A Attending Maynor Monge MD   Hosp Day # 3 PCP Gerardo Power     CC: Fever, left leg redness    SUBJECTIVE:  Left 122.0 10/28/2018    CREATSERUM 0.54 10/28/2018    BUN 8 10/28/2018     10/28/2018    K 4.0 10/28/2018    K 4.0 10/28/2018     10/28/2018    CO2 25.0 10/28/2018    GLU 90 10/28/2018    CA 8.8 10/28/2018       Imaging:   XR CHEST AP PORTABLE  (CP yesterday  2. Cerebral palsy with functional quadriplegia  3. Seizure disorder  4. Hyponatremia-improved with IV fluids  5. Hypokalemia– replace  6. Fever and leukocytosis due to sepsis from left lower extremity cellulitis–improving with IV antibiotics  7.

## 2018-10-28 NOTE — SLP NOTE
ADULT SWALLOWING EVALUATION    ASSESSMENT    ASSESSMENT/OVERALL IMPRESSION: Patient seen for clinical bedside swallowing evaluation; RN approved session.  Pleasant 62year old female admitted to [facility] on 10/25/2018 secondary to Cellulitis of left no complaints of globus sensation. Hyolaryngeal excursion appears WFL per palpation. Patient presents with chronic mild oral dysphagia characterized by reduced bolus manipulation and formation secondary to CP.  Recommend mechanical soft chopped diet and Reduced right facial  Range of Motion: Reduced right facial;Reduced right lingual;Reduced left lingual       Voice Quality: Harsh/Strained  Respiratory Status: Unlabored  Consistencies Trialed: Thin liquids;Puree; Soft solid;Hard solid  Method of Presentati

## 2018-10-29 VITALS
WEIGHT: 160 LBS | DIASTOLIC BLOOD PRESSURE: 98 MMHG | TEMPERATURE: 98 F | RESPIRATION RATE: 20 BRPM | OXYGEN SATURATION: 96 % | HEART RATE: 93 BPM | BODY MASS INDEX: 31 KG/M2 | SYSTOLIC BLOOD PRESSURE: 159 MMHG

## 2018-10-29 PROCEDURE — 99239 HOSP IP/OBS DSCHRG MGMT >30: CPT | Performed by: INTERNAL MEDICINE

## 2018-10-29 RX ORDER — PENICILLIN V POTASSIUM 500 MG/1
500 TABLET ORAL DAILY
Qty: 90 TABLET | Refills: 11 | Status: ON HOLD | OUTPATIENT
Start: 2018-11-12 | End: 2018-12-22

## 2018-10-29 RX ORDER — CEPHALEXIN 500 MG/1
500 CAPSULE ORAL 4 TIMES DAILY
Qty: 60 CAPSULE | Refills: 0 | Status: SHIPPED | OUTPATIENT
Start: 2018-10-29 | End: 2018-11-13

## 2018-10-29 NOTE — PROGRESS NOTES
[facility]  Progress Note    Marilia Rodas Patient Status:  Inpatient    1960 MRN [id number]   [facility] 5NW-A Attending Jaqui Whitman MD   Hosp Day # 4 PCP Sofia Daly     CC: Fever, left leg redness    SUBJECTIVE:   fee 10 mg Intravenous Q8H PRN   ceFAZolin (ANCEF) IVPB 1g/100ml in 0.9% NaCl minibag/add-van 1 g Intravenous Q8H   influenza vaccine split quad (FLULAVAL) ages 6 months to 65 years inj 0.5ml 0.5 mL Intramuscular Prior to discharge       ASSESSMENT / PLAN: murmur,  regular rate and rhythm  Abdomen: soft, non-tender; bowel sounds normal  Extremities: Bilateral lower extremity lymphedema, contractures of bilateral lower extremities, left lower extremity redness and swelling present, left leg more swollen than

## 2018-10-29 NOTE — CM/SW NOTE
Care Management/BPCI:    Met with patient at bedside to explain the BPCI/Medicare program. Patient agreed with phone f/u for 3 months from 0 [facility] after discharge from [facility]. Patient was enrolled under .  BPCI/Medicare Letter and [place]

## 2018-10-29 NOTE — CONSULTS
[facility]  Report of Inpatient Wound Care Consultation     Montrell Alvarez Patient Status:  Inpatient    1960 MRN [id number]   [facility] 5NW-A Attending Joselyn Hannah MD   [facility] Day # 4 PCP DONN JONES     REASON FOR CONS drainage, no odor. Cleansed with saline, applied Carrasyn hydrogel, double layer vasolene, duoderm thin. Recommend dressing change every 48 hours.      Durable Medical Equipment:  NA  Offloading/Footwear:  NA  Compression:  NA    Physical Therapy Wound Go

## 2018-10-29 NOTE — CM/SW NOTE
MD orders received for discharge. SW met with pt and spoke with caregiver, Zeynep Choi via pt's phone regarding discharge today and confirm caregiver arrival at pt's home.  Caregiver will meet pt at her home 4:30/5:00pm.   BLS arranged via Candido Baca ([phone]

## 2018-10-29 NOTE — PLAN OF CARE
GASTROINTESTINAL - ADULT    • Minimal or absence of nausea and vomiting Not Progressing          RESPIRATORY - ADULT    • Achieves optimal ventilation and oxygenation Progressing                NO NEW DRAINAGE FROM CHEST TUBE SINCE Saturday EVENING.   NEEDS

## 2018-10-29 NOTE — PLAN OF CARE
DISCHARGE PLANNING    • Discharge to home or other facility with appropriate resources Adequate for Discharge        GASTROINTESTINAL - ADULT    • Minimal or absence of nausea and vomiting Adequate for Discharge        Patient/Family Goals    • Patient/Fam

## 2018-10-30 NOTE — DISCHARGE SUMMARY
[facility] [place] HOSPITALIST  DISCHARGE SUMMARY     Miguel Collier Patient Status:  Inpatient    1960 MRN [id number]   [facility] 5NW-A Attending No att. providers found   Hosp Day # 4 PCP Nicanor Paniagua     Date of Admission: 10/25/2018  Mark total per week. Patient presented with fever. Was started on IV hydration was given Zosyn and vancomycin in the ER. Lasix was held as patient was requiring fluids. Blood cultures and respiratory swab were negative for growth.   Patient was seen by infec cephALEXin 500 MG Caps  Commonly known as:  KEFLEX      Take 1 capsule (500 mg total) by mouth 4 (four) times daily for 15 days.    Stop taking on:  11/13/2018  Quantity:  60 capsule  Refills:  0     penicillin v potassium 500 MG Tabs  Commonly known as: murmur,  regular rate and rhythm  Abdomen: soft, non-tender; bowel sounds normal  Extremities: Bilateral lower extremity lymphedema, contractures of bilateral lower extremities, left lower extremity redness and swelling present, left leg more swollen than V once daily once finished with oral Keflex  2. blood culture with no growth. 3.  Left leg redness and swelling improving  2. Cerebral palsy with functional quadriplegia  3. Seizure disorder  4. Hyponatremia-improved with IV fluids  5.  Hypokalemia– replac

## 2018-10-30 NOTE — PROGRESS NOTES
NURSING DISCHARGE NOTE    Discharged Home via Wheelchair. Accompanied by Support staff  Belongings Taken by patient/family. IV discontinued.  Went over discharge instructions and prescriptions with pt and caregiver via phone and they verbalized unde

## 2018-10-31 NOTE — CM/SW NOTE
Patient discharged on 10/29/18 as previously planned.        10/31/18 0800   Discharge disposition   Expected discharge disposition Home or Self   Discharge transportation Other (comment)  ([facility])

## 2018-12-19 ENCOUNTER — HOSPITAL ENCOUNTER (INPATIENT)
Facility: HOSPITAL | Age: 58
LOS: 4 days | Discharge: HOME HEALTH | DRG: 871 | End: 2018-12-23
Attending: EMERGENCY MEDICINE | Admitting: HOSPITALIST
Payer: MEDICARE

## 2018-12-19 ENCOUNTER — APPOINTMENT (OUTPATIENT)
Dept: GENERAL RADIOLOGY | Facility: HOSPITAL | Age: 58
DRG: 871 | End: 2018-12-19
Attending: EMERGENCY MEDICINE
Payer: MEDICARE

## 2018-12-19 DIAGNOSIS — D72.829 LEUKOCYTOSIS, UNSPECIFIED TYPE: ICD-10-CM

## 2018-12-19 DIAGNOSIS — R50.9 FEVER, UNSPECIFIED FEVER CAUSE: Primary | ICD-10-CM

## 2018-12-19 DIAGNOSIS — L03.90 CELLULITIS, UNSPECIFIED CELLULITIS SITE: ICD-10-CM

## 2018-12-19 DIAGNOSIS — R11.11 VOMITING WITHOUT NAUSEA, INTRACTABILITY OF VOMITING NOT SPECIFIED, UNSPECIFIED VOMITING TYPE: ICD-10-CM

## 2018-12-19 LAB
ALBUMIN SERPL-MCNC: 3 G/DL (ref 3.1–4.5)
ALBUMIN SERPL-MCNC: 4 G/DL (ref 3.1–4.5)
ALBUMIN/GLOB SERPL: 0.9 {RATIO} (ref 1–2)
ALBUMIN/GLOB SERPL: 0.9 {RATIO} (ref 1–2)
ALP LIVER SERPL-CCNC: 57 U/L (ref 46–118)
ALP LIVER SERPL-CCNC: 84 U/L (ref 46–118)
ALT SERPL-CCNC: 15 U/L (ref 14–54)
ALT SERPL-CCNC: 18 U/L (ref 14–54)
ANION GAP SERPL CALC-SCNC: 5 MMOL/L (ref 0–18)
ANION GAP SERPL CALC-SCNC: 6 MMOL/L (ref 0–18)
ANION GAP SERPL CALC-SCNC: 6 MMOL/L (ref 0–18)
AST SERPL-CCNC: 10 U/L (ref 15–41)
AST SERPL-CCNC: 15 U/L (ref 15–41)
ATRIAL RATE: 111 BPM
BASOPHILS # BLD AUTO: 0.03 X10(3) UL (ref 0–0.1)
BASOPHILS # BLD AUTO: 0.03 X10(3) UL (ref 0–0.1)
BASOPHILS NFR BLD AUTO: 0.2 %
BASOPHILS NFR BLD AUTO: 0.2 %
BILIRUB SERPL-MCNC: 0.4 MG/DL (ref 0.1–2)
BILIRUB SERPL-MCNC: 0.5 MG/DL (ref 0.1–2)
BILIRUB UR QL STRIP.AUTO: NEGATIVE
BUN BLD-MCNC: 14 MG/DL (ref 8–20)
BUN BLD-MCNC: 15 MG/DL (ref 8–20)
BUN BLD-MCNC: 16 MG/DL (ref 8–20)
BUN/CREAT SERPL: 21.2 (ref 10–20)
BUN/CREAT SERPL: 22.2 (ref 10–20)
BUN/CREAT SERPL: 22.7 (ref 10–20)
CALCIUM BLD-MCNC: 8 MG/DL (ref 8.3–10.3)
CALCIUM BLD-MCNC: 8 MG/DL (ref 8.3–10.3)
CALCIUM BLD-MCNC: 8.9 MG/DL (ref 8.3–10.3)
CHLORIDE SERPL-SCNC: 107 MMOL/L (ref 101–111)
CHLORIDE SERPL-SCNC: 110 MMOL/L (ref 101–111)
CHLORIDE SERPL-SCNC: 110 MMOL/L (ref 101–111)
CLARITY UR REFRACT.AUTO: CLEAR
CO2 SERPL-SCNC: 22 MMOL/L (ref 22–32)
CO2 SERPL-SCNC: 22 MMOL/L (ref 22–32)
CO2 SERPL-SCNC: 24 MMOL/L (ref 22–32)
COLOR UR AUTO: YELLOW
CREAT BLD-MCNC: 0.66 MG/DL (ref 0.55–1.02)
CREAT BLD-MCNC: 0.66 MG/DL (ref 0.55–1.02)
CREAT BLD-MCNC: 0.72 MG/DL (ref 0.55–1.02)
EOSINOPHIL # BLD AUTO: 0 X10(3) UL (ref 0–0.3)
EOSINOPHIL # BLD AUTO: 0.01 X10(3) UL (ref 0–0.3)
EOSINOPHIL NFR BLD AUTO: 0 %
EOSINOPHIL NFR BLD AUTO: 0.1 %
ERYTHROCYTE [DISTWIDTH] IN BLOOD BY AUTOMATED COUNT: 13.5 % (ref 11.5–16)
ERYTHROCYTE [DISTWIDTH] IN BLOOD BY AUTOMATED COUNT: 13.6 % (ref 11.5–16)
GLOBULIN PLAS-MCNC: 3.5 G/DL (ref 2.8–4.4)
GLOBULIN PLAS-MCNC: 4.6 G/DL (ref 2.8–4.4)
GLUCOSE BLD-MCNC: 122 MG/DL (ref 70–99)
GLUCOSE BLD-MCNC: 123 MG/DL (ref 70–99)
GLUCOSE BLD-MCNC: 126 MG/DL (ref 70–99)
GLUCOSE UR STRIP.AUTO-MCNC: NEGATIVE MG/DL
HCT VFR BLD AUTO: 36.9 % (ref 34–50)
HCT VFR BLD AUTO: 43.4 % (ref 34–50)
HGB BLD-MCNC: 11.8 G/DL (ref 12–16)
HGB BLD-MCNC: 14.7 G/DL (ref 12–16)
IMMATURE GRANULOCYTE COUNT: 0.1 X10(3) UL (ref 0–1)
IMMATURE GRANULOCYTE COUNT: 0.18 X10(3) UL (ref 0–1)
IMMATURE GRANULOCYTE RATIO %: 0.6 %
IMMATURE GRANULOCYTE RATIO %: 1 %
KETONES UR STRIP.AUTO-MCNC: NEGATIVE MG/DL
LACTIC ACID: 1.4 MMOL/L (ref 0.5–2)
LACTIC ACID: 1.4 MMOL/L (ref 0.5–2)
LACTIC ACID: 1.6 MMOL/L (ref 0.5–2)
LACTIC ACID: 2 MMOL/L (ref 0.5–2)
LEUKOCYTE ESTERASE UR QL STRIP.AUTO: NEGATIVE
LYMPHOCYTES # BLD AUTO: 0.48 X10(3) UL (ref 0.9–4)
LYMPHOCYTES # BLD AUTO: 0.51 X10(3) UL (ref 0.9–4)
LYMPHOCYTES NFR BLD AUTO: 2.7 %
LYMPHOCYTES NFR BLD AUTO: 2.9 %
M PROTEIN MFR SERPL ELPH: 6.5 G/DL (ref 6.4–8.2)
M PROTEIN MFR SERPL ELPH: 8.6 G/DL (ref 6.4–8.2)
MCH RBC QN AUTO: 29.3 PG (ref 27–33.2)
MCH RBC QN AUTO: 29.5 PG (ref 27–33.2)
MCHC RBC AUTO-ENTMCNC: 32 G/DL (ref 31–37)
MCHC RBC AUTO-ENTMCNC: 33.9 G/DL (ref 31–37)
MCV RBC AUTO: 87.1 FL (ref 81–100)
MCV RBC AUTO: 91.6 FL (ref 81–100)
MONOCYTES # BLD AUTO: 0.67 X10(3) UL (ref 0.1–1)
MONOCYTES # BLD AUTO: 0.69 X10(3) UL (ref 0.1–1)
MONOCYTES NFR BLD AUTO: 3.9 %
MONOCYTES NFR BLD AUTO: 3.9 %
NEUTROPHIL ABS PRELIM: 16.07 X10 (3) UL (ref 1.3–6.7)
NEUTROPHIL ABS PRELIM: 16.52 X10 (3) UL (ref 1.3–6.7)
NEUTROPHILS # BLD AUTO: 16.07 X10(3) UL (ref 1.3–6.7)
NEUTROPHILS # BLD AUTO: 16.52 X10(3) UL (ref 1.3–6.7)
NEUTROPHILS NFR BLD AUTO: 92.2 %
NEUTROPHILS NFR BLD AUTO: 92.3 %
NITRITE UR QL STRIP.AUTO: NEGATIVE
OSMOLALITY SERPL CALC.SUM OF ELEC: 280 MOSM/KG (ref 275–295)
OSMOLALITY SERPL CALC.SUM OF ELEC: 288 MOSM/KG (ref 275–295)
OSMOLALITY SERPL CALC.SUM OF ELEC: 292 MOSM/KG (ref 275–295)
P AXIS: 75 DEGREES
P-R INTERVAL: 152 MS
PH UR STRIP.AUTO: 6 [PH] (ref 4.5–8)
PHENYTOIN (DILANTIN): 15.8 UG/ML (ref 10–20)
PLATELET # BLD AUTO: 135 10(3)UL (ref 150–450)
PLATELET # BLD AUTO: 148 10(3)UL (ref 150–450)
POTASSIUM SERPL-SCNC: 3.5 MMOL/L (ref 3.6–5.1)
POTASSIUM SERPL-SCNC: 3.6 MMOL/L (ref 3.6–5.1)
POTASSIUM SERPL-SCNC: 4.4 MMOL/L (ref 3.6–5.1)
PROT UR STRIP.AUTO-MCNC: NEGATIVE MG/DL
Q-T INTERVAL: 318 MS
QRS DURATION: 76 MS
QTC CALCULATION (BEZET): 432 MS
R AXIS: 19 DEGREES
RBC # BLD AUTO: 4.03 X10(6)UL (ref 3.8–5.1)
RBC # BLD AUTO: 4.98 X10(6)UL (ref 3.8–5.1)
RED CELL DISTRIBUTION WIDTH-SD: 42.7 FL (ref 35.1–46.3)
RED CELL DISTRIBUTION WIDTH-SD: 46.4 FL (ref 35.1–46.3)
SODIUM SERPL-SCNC: 134 MMOL/L (ref 136–144)
SODIUM SERPL-SCNC: 138 MMOL/L (ref 136–144)
SODIUM SERPL-SCNC: 140 MMOL/L (ref 136–144)
SP GR UR STRIP.AUTO: 1.02 (ref 1–1.03)
T AXIS: 34 DEGREES
UROBILINOGEN UR STRIP.AUTO-MCNC: <2 MG/DL
VENTRICULAR RATE: 111 BPM
WBC # BLD AUTO: 17.4 X10(3) UL (ref 4–13)
WBC # BLD AUTO: 17.9 X10(3) UL (ref 4–13)

## 2018-12-19 PROCEDURE — 99223 1ST HOSP IP/OBS HIGH 75: CPT | Performed by: HOSPITALIST

## 2018-12-19 PROCEDURE — 71045 X-RAY EXAM CHEST 1 VIEW: CPT | Performed by: EMERGENCY MEDICINE

## 2018-12-19 RX ORDER — METOCLOPRAMIDE HYDROCHLORIDE 5 MG/ML
10 INJECTION INTRAMUSCULAR; INTRAVENOUS EVERY 8 HOURS PRN
Status: DISCONTINUED | OUTPATIENT
Start: 2018-12-19 | End: 2018-12-23

## 2018-12-19 RX ORDER — ACETAMINOPHEN 160 MG
2000 TABLET,DISINTEGRATING ORAL DAILY
Status: DISCONTINUED | OUTPATIENT
Start: 2018-12-19 | End: 2018-12-23

## 2018-12-19 RX ORDER — ASCORBIC ACID 500 MG
500 TABLET ORAL DAILY
Status: DISCONTINUED | OUTPATIENT
Start: 2018-12-19 | End: 2018-12-23

## 2018-12-19 RX ORDER — SODIUM CHLORIDE 9 MG/ML
1000 INJECTION, SOLUTION INTRAVENOUS ONCE
Status: COMPLETED | OUTPATIENT
Start: 2018-12-19 | End: 2018-12-19

## 2018-12-19 RX ORDER — MAGNESIUM GLUCONATE 30 MG(550)
1 TABLET ORAL DAILY
Status: DISCONTINUED | OUTPATIENT
Start: 2018-12-19 | End: 2018-12-19 | Stop reason: RX

## 2018-12-19 RX ORDER — ACETAMINOPHEN 500 MG
1000 TABLET ORAL ONCE
Status: DISCONTINUED | OUTPATIENT
Start: 2018-12-19 | End: 2018-12-23

## 2018-12-19 RX ORDER — KETOROLAC TROMETHAMINE 15 MG/ML
30 INJECTION, SOLUTION INTRAMUSCULAR; INTRAVENOUS ONCE
Status: COMPLETED | OUTPATIENT
Start: 2018-12-19 | End: 2018-12-19

## 2018-12-19 RX ORDER — ACETAMINOPHEN 500 MG
1000 TABLET ORAL EVERY 6 HOURS PRN
Status: DISCONTINUED | OUTPATIENT
Start: 2018-12-19 | End: 2018-12-23

## 2018-12-19 RX ORDER — ONDANSETRON 2 MG/ML
4 INJECTION INTRAMUSCULAR; INTRAVENOUS EVERY 6 HOURS PRN
Status: DISCONTINUED | OUTPATIENT
Start: 2018-12-19 | End: 2018-12-23

## 2018-12-19 RX ORDER — ACETAMINOPHEN 325 MG/1
650 TABLET ORAL EVERY 6 HOURS PRN
Status: DISCONTINUED | OUTPATIENT
Start: 2018-12-19 | End: 2018-12-19

## 2018-12-19 RX ORDER — SODIUM CHLORIDE 9 MG/ML
INJECTION, SOLUTION INTRAVENOUS CONTINUOUS
Status: DISCONTINUED | OUTPATIENT
Start: 2018-12-19 | End: 2018-12-21

## 2018-12-19 RX ORDER — CEFAZOLIN SODIUM/WATER 2 G/20 ML
2 SYRINGE (ML) INTRAVENOUS EVERY 8 HOURS
Status: DISCONTINUED | OUTPATIENT
Start: 2018-12-19 | End: 2018-12-23

## 2018-12-19 RX ORDER — PHENYTOIN SODIUM 100 MG/1
300 CAPSULE, EXTENDED RELEASE ORAL DAILY
Status: DISCONTINUED | OUTPATIENT
Start: 2018-12-19 | End: 2018-12-23

## 2018-12-19 RX ORDER — IBUPROFEN 400 MG/1
400 TABLET ORAL EVERY 6 HOURS PRN
Status: DISCONTINUED | OUTPATIENT
Start: 2018-12-19 | End: 2018-12-23

## 2018-12-19 RX ORDER — FUROSEMIDE 20 MG/1
20 TABLET ORAL DAILY
Status: DISCONTINUED | OUTPATIENT
Start: 2018-12-19 | End: 2018-12-19

## 2018-12-19 RX ORDER — POTASSIUM CHLORIDE 20 MEQ/1
40 TABLET, EXTENDED RELEASE ORAL EVERY 4 HOURS
Status: COMPLETED | OUTPATIENT
Start: 2018-12-19 | End: 2018-12-19

## 2018-12-19 RX ORDER — ACETAMINOPHEN 650 MG/1
1000 SUPPOSITORY RECTAL ONCE
Status: COMPLETED | OUTPATIENT
Start: 2018-12-19 | End: 2018-12-19

## 2018-12-19 RX ORDER — ENOXAPARIN SODIUM 100 MG/ML
40 INJECTION SUBCUTANEOUS DAILY
Status: DISCONTINUED | OUTPATIENT
Start: 2018-12-19 | End: 2018-12-23

## 2018-12-19 NOTE — PROGRESS NOTES
[facility]  Progress Note     rAt Agrawal Patient Status:  Inpatient    1960 MRN [id number]   [facility] 4NW-A Attending Beni Hull MD   Hosp Day # 0 PCP Elfida Marrow     Chief Complaint: hypotension     S: Patient ceFAZolin  2 g Intravenous Q8H   • enoxaparin  40 mg Subcutaneous Daily   • acetaminophen  1,000 mg Oral Once       ASSESSMENT / PLAN:     1. Severe sepsis with hypotension due to LLE cellulitis - recurrent  1. Sepsis protocol   2.  IVF boluses ordered per

## 2018-12-19 NOTE — CONSULTS
INFECTIOUS DISEASE CONSULT NOTE    Darryle Buoy Patient Status:  Inpatient    1960 MRN [id number]   [facility] 4NW-A Attending Garrison Nieto MD   Hosp Day # 0 PCP [facility] Enoxaparin Sodium (LOVENOX) 40 MG/0.4ML injection 40 mg, 40 mg, Subcutaneous, Daily  •  ondansetron HCl (ZOFRAN) injection 4 mg, 4 mg, Intravenous, Q6H PRN  •  Metoclopramide HCl (REGLAN) injection 10 mg, 10 mg, Intravenous, Q8H PRN  •  acetaminophen (TYLE 107  110  110   CO2  22.0  22.0  24.0   ALKPHO  84   --   57   AST  15   --   10*   ALT  18   --   15   BILT  0.4   --   0.5   TP  8.6*   --   6.5     Recent Labs   Lab  12/19/18   0047   COLORUR  Yellow   CLARITY  Clear   SPECGRAVITY  1.019   Clemetine Larger

## 2018-12-19 NOTE — CM/SW NOTE
12/19/18 1400   CM/SW Referral Data   Referral Source Social Work (self-referral)   Reason for Referral Discharge planning   Informant Patient   Patient Info   Patient's Mental Status Alert;Oriented   Patient's Home Environment Condo/Apt with elevator

## 2018-12-19 NOTE — PROGRESS NOTES
Pharmacy Note: Dietary Supplement Discontinuation Per Policy    Potassium gluconate has been discontinued on Nesha Lord per policy. This dietary supplement may be restarted upon discharge using the medication reconciliation process.     Thank you,   Fo

## 2018-12-19 NOTE — PROGRESS NOTES
Pt admitted with c/o fevers, temperature as high as 105.0 temporal in the ER, 102.3 axillary on arrival to floor. Pt is alert and oriented x 4, history of cerebral palsy, speech garbled.  Lymphedema to bilateral lower extremities, left greater than right, n

## 2018-12-19 NOTE — H&P
[facility]  History and Physical     Jason Patel Patient Status:  Emergency    1960 MRN [id number]   Location [facility] Attending Sidra Parkinson MD   Hosp Day # 0 PCP Zahraa Clinton     Chief Complaint: Disp:  Rfl:    Vitamin C 500 MG Oral Tab Take 500 mg by mouth daily. Disp:  Rfl:    Cholecalciferol (VITAMIN D) 2000 units Oral Cap Take 2,000 Units by mouth daily. Disp:  Rfl:    Potassium Gluconate 595 (99 K) MG Oral Tab Take 1 tablet by mouth daily.  Dis suppressive therapy with Pen V at home  3. ID consut  2. Cerebral palsy functional quadriplegia  3. Seizure disorder  4. Chronic lymphedema  1. OT consult  5. Hyponatremia  1. Does not appear dehydrated  2. monitor  6. Hyperglycemia  1.  Recheck fasting BS

## 2018-12-19 NOTE — PLAN OF CARE
Assumed care of patient around 0730, alert and oriented X4, garbled speech- baseline pt with cerebral palsy, no c/o CP/SOB, SpO2 98 % on RA  Rhythm/HR: NSR 80s  0750-paged Dr Rosamaria Fung- SBP 80/50  0810- paged Dr Mathew Safe 70/42, afebrile, NSR   Dr Rosamaria Fung

## 2018-12-19 NOTE — OCCUPATIONAL THERAPY NOTE
Skilled OT order received, chart reviewed. OT order received to address pt's chronic lymphedema. Lymphedema certified therapist is not in house this date. Will refer pt upon this provider's return 12/20.      Thank you for allowing me to care for this patie

## 2018-12-19 NOTE — ED PROVIDER NOTES
Patient Seen in: [facility] Emergency Department    History   Patient presents with:  Fever (infectious)  Nausea/Vomiting/Diarrhea (gastrointestinal)    Stated Complaint: fever vomiting x 2    HPI    Patient is a 77-year-old female history of cerebr accommodation, extra motion intact  Neck: Supple  Lungs: Clear  Back: No decubitus ulcers noted  Cardiovascular: Regular rate and rhythm, normal S1-S2  Abdominal: Soft, nontender, nondistended  Neurological: Patient has contractures bilateral upper extremi these tests on the individual orders. LACTIC ACID, PLASMA   LACTIC ACID, PLASMA   RAINBOW DRAW BLUE   RAINBOW DRAW LAVENDER   RAINBOW DRAW LIGHT GREEN   RAINBOW DRAW GOLD   URINE CULTURE, ROUTINE   BLOOD CULTURE   BLOOD CULTURE   Glucose 123.   Sodium 134 type    Disposition:  Admit  12/19/2018  2:07 am    Follow-up:  No follow-up provider specified.       Medications Prescribed:  Current Discharge Medication List        Present on Admission  Date Reviewed: 4/14/2016          ICD-10-CM Noted POA    Fever, un

## 2018-12-20 ENCOUNTER — APPOINTMENT (OUTPATIENT)
Dept: ULTRASOUND IMAGING | Facility: HOSPITAL | Age: 58
DRG: 871 | End: 2018-12-20
Attending: INTERNAL MEDICINE
Payer: MEDICARE

## 2018-12-20 ENCOUNTER — APPOINTMENT (OUTPATIENT)
Dept: CV DIAGNOSTICS | Facility: HOSPITAL | Age: 58
DRG: 871 | End: 2018-12-20
Attending: INTERNAL MEDICINE
Payer: MEDICARE

## 2018-12-20 LAB — POTASSIUM SERPL-SCNC: 4 MMOL/L (ref 3.6–5.1)

## 2018-12-20 PROCEDURE — 93306 TTE W/DOPPLER COMPLETE: CPT | Performed by: INTERNAL MEDICINE

## 2018-12-20 PROCEDURE — 93970 EXTREMITY STUDY: CPT | Performed by: INTERNAL MEDICINE

## 2018-12-20 PROCEDURE — 99232 SBSQ HOSP IP/OBS MODERATE 35: CPT | Performed by: INTERNAL MEDICINE

## 2018-12-20 NOTE — PLAN OF CARE
RISK FOR INFECTION - ADULT    • Absence of fever/infection during anticipated neutropenic period Not Progressing          GASTROINTESTINAL - ADULT    • Minimal or absence of nausea and vomiting Progressing    • Maintains or returns to baseline bowel functi

## 2018-12-20 NOTE — PROGRESS NOTES
[facility]  Progress Note     Montrell Alvarez Patient Status:  Inpatient    1960 MRN [id number]   [facility] 4NW-A Attending Loki Gore MD   Hosp Day # 1 PCP Brittni Marker     Chief Complaint: leg edema     S: Patient re Sodium Extended  300 mg Oral Daily   • Vitamin C  500 mg Oral Daily   • Vitamin D3  2,000 Units Oral Daily   • ceFAZolin  2 g Intravenous Q8H   • enoxaparin  40 mg Subcutaneous Daily   • acetaminophen  1,000 mg Oral Once       ASSESSMENT / PLAN:     1. Sev

## 2018-12-20 NOTE — OCCUPATIONAL THERAPY NOTE
Lymphedema eval order received. Chart reviewed. Patient well known to our services. Currently, patient admitted with recurrent LLE cellulitis.   Educated pt in the guidelines for lymphedema treatment following dx of infection: hold all treatment for 72 h

## 2018-12-20 NOTE — PROGRESS NOTES
[address] [place] [address]  TEL: [phone]  FAX: [phone]    Sanchez Cast Patient Status:  Inpatient    1960 MRN [id number]   [facility] 4NW-A Attending Lori Shafer MD   Hosp Day # 1 PCP [facility] --   15   --    BILT  0.4   --   0.5   --    TP  8.6*   --   6.5   --        Microbiology    Reviewed in EMR,   Hospital Encounter on 12/19/18   1.  BLOOD CULTURE     Status: None (Preliminary result)    Collection Time: 12/19/18  1:23 AM   Result Value Re

## 2018-12-21 LAB
ERYTHROCYTE [DISTWIDTH] IN BLOOD BY AUTOMATED COUNT: 13.3 % (ref 11.5–16)
HCT VFR BLD AUTO: 33.6 % (ref 34–50)
HGB BLD-MCNC: 10.8 G/DL (ref 12–16)
MCH RBC QN AUTO: 29.3 PG (ref 27–33.2)
MCHC RBC AUTO-ENTMCNC: 32.1 G/DL (ref 31–37)
MCV RBC AUTO: 91.3 FL (ref 81–100)
PLATELET # BLD AUTO: 111 10(3)UL (ref 150–450)
RBC # BLD AUTO: 3.68 X10(6)UL (ref 3.8–5.1)
RED CELL DISTRIBUTION WIDTH-SD: 45.3 FL (ref 35.1–46.3)
WBC # BLD AUTO: 9 X10(3) UL (ref 4–13)

## 2018-12-21 PROCEDURE — 99232 SBSQ HOSP IP/OBS MODERATE 35: CPT | Performed by: INTERNAL MEDICINE

## 2018-12-21 RX ORDER — FUROSEMIDE 10 MG/ML
40 INJECTION INTRAMUSCULAR; INTRAVENOUS ONCE
Status: COMPLETED | OUTPATIENT
Start: 2018-12-21 | End: 2018-12-21

## 2018-12-21 NOTE — CM/SW NOTE
SW received a message from [facility] stating the pt is current w/[facility].  SW sent face to face and additional information via [facility] per there request.

## 2018-12-21 NOTE — CM/SW NOTE
SW spoke w/[facility] . Sent updated info. Informed them the pt will not dc today, but most likely will over the weekend.

## 2018-12-21 NOTE — PROGRESS NOTES
[address] [facility]  TEL: [phone]  FAX: [phone]    Creola Like Patient Status:  Inpatient    1960 MRN [id number]   [facility] 4NW-A Attending Nabila Joshi MD   Hosp Day # 2 PCP Maribell Johnson AST  15   --   10*   --    ALT  18   --   15   --    BILT  0.4   --   0.5   --    TP  8.6*   --   6.5   --        Microbiology    Reviewed in EMR,   Hospital Encounter on 12/19/18   1.  BLOOD CULTURE     Status: None (Preliminary result)    Nelia Mart

## 2018-12-21 NOTE — PROGRESS NOTES
[facility]ITAL[facility]  Progress Note     Cecilia Lizzeth Patient Status:  Inpatient    1960 MRN [id number]   [facility] 4NW-A Attending Edu James MD   Hosp Day # 2 PCP Ania Dubon     Chief Complaint: leg edema     S: Patient wi reviewed in Epic.     Medications:   • Phenytoin Sodium Extended  300 mg Oral Daily   • Vitamin C  500 mg Oral Daily   • Vitamin D3  2,000 Units Oral Daily   • ceFAZolin  2 g Intravenous Q8H   • enoxaparin  40 mg Subcutaneous Daily   • acetaminophen  1,000

## 2018-12-21 NOTE — PLAN OF CARE
GASTROINTESTINAL - ADULT    • Minimal or absence of nausea and vomiting Progressing        RISK FOR INFECTION - ADULT    • Absence of fever/infection during anticipated neutropenic period Progressing            Pt  Alert this shift, bit of slurred speech,

## 2018-12-21 NOTE — PLAN OF CARE
Pt Left foot significantly swollen. Lymphedema specialist will see pt tomorrow. Foot is still red and warm to touch leg elevated on 2pillows. Pt turned Q2. Denies pain. Afebrile during the day, on IV ancef. Pt tolerating diet.  Report given to next shift

## 2018-12-21 NOTE — OCCUPATIONAL THERAPY NOTE
OCCUPATIONAL THERAPY EVALUATION - INPATIENT     Room Number: 085/426-O  Evaluation Date: 12/21/2018  Type of Evaluation: Initial  Presenting Problem: LLE cellulitis    Physician Order: IP Consult to Occupational Therapy  Reason for Therapy: ADL/IADL Dysfun when CG is not there and waits until she has assist to get cleaned up. Caregiver had been trained in donning/doffing compression garments, however has not been wearing d/t limited amount of time.   Patient sits with legs in lowered position for long period treatment following dx of infection: hold all treatment for 72 hours; beginning compression post 72 hours of antibiotics, as long as infection is resolving  Edema/Tissue Quality:  LLE boggy edema, non-pitting foot to groin.   Poor tissue glide throughout LL therapy records    Specific performance deficits impacting engagement in ADL/IADL HIGH  5+ performance deficits    Client Assessment/Performance Deficits HIGH - Comorbidities and significant modifications of tasks    Clinical Decision Making HIGH - Analysi

## 2018-12-22 LAB
ERYTHROCYTE [DISTWIDTH] IN BLOOD BY AUTOMATED COUNT: 12.9 % (ref 11.5–16)
HCT VFR BLD AUTO: 35.8 % (ref 34–50)
HGB BLD-MCNC: 11.6 G/DL (ref 12–16)
MCH RBC QN AUTO: 28.7 PG (ref 27–33.2)
MCHC RBC AUTO-ENTMCNC: 32.4 G/DL (ref 31–37)
MCV RBC AUTO: 88.6 FL (ref 81–100)
PLATELET # BLD AUTO: 140 10(3)UL (ref 150–450)
RBC # BLD AUTO: 4.04 X10(6)UL (ref 3.8–5.1)
RED CELL DISTRIBUTION WIDTH-SD: 42.2 FL (ref 35.1–46.3)
WBC # BLD AUTO: 7.3 X10(3) UL (ref 4–13)

## 2018-12-22 PROCEDURE — 99232 SBSQ HOSP IP/OBS MODERATE 35: CPT | Performed by: INTERNAL MEDICINE

## 2018-12-22 RX ORDER — PENICILLIN V POTASSIUM 500 MG/1
500 TABLET ORAL 2 TIMES DAILY
Qty: 60 TABLET | Refills: 11 | Status: ON HOLD | OUTPATIENT
Start: 2019-01-04 | End: 2019-02-02

## 2018-12-22 RX ORDER — FUROSEMIDE 10 MG/ML
20 INJECTION INTRAMUSCULAR; INTRAVENOUS ONCE
Status: COMPLETED | OUTPATIENT
Start: 2018-12-22 | End: 2018-12-22

## 2018-12-22 RX ORDER — CEPHALEXIN 500 MG/1
500 CAPSULE ORAL 4 TIMES DAILY
Qty: 60 CAPSULE | Refills: 0 | Status: SHIPPED | OUTPATIENT
Start: 2018-12-22 | End: 2019-01-06

## 2018-12-22 NOTE — OCCUPATIONAL THERAPY NOTE
OCCUPATIONAL THERAPY TREATMENT NOTE - INPATIENT     Room Number: 560/028-E  Session: 1   Number of Visits to Meet Established Goals: 2    Presenting Problem: LLE cellulitis    History related to current admission: Patient is a 57y/o female admitted 12/19/1 taking off regular upper body clothing?: Total  -   Taking care of personal grooming such as brushing teeth?: A Lot  -   Eating meals?: A Lot    AM-PAC Score:  Score: 8  Approx Degree of Impairment: 85.69%  Standardized Score (AM-PAC Scale): 22.86  CMS Mod patient states she does not feel the need for further assist at this time. Will discharge from OT services at this time.      OT Discharge Recommendations: 24 hour care/supervision;Home  OT Device Recommendations: (Kezia kimbrough)    PLAN  OT Treatment Plan:

## 2018-12-22 NOTE — PROGRESS NOTES
[facility]                INFECTIOUS DISEASE PROGRESS NOTE    Art Agrawal Patient Status:  Inpatient    1960 MRN [id number]   [facility] 4NW-A Attending Beni Hull MD   Hosp Day # 3 PCP [facility]     Antibiotic Microbiology    Hospital Encounter on 12/19/18   1.  BLOOD CULTURE     Status: None (Preliminary result)    Collection Time: 12/19/18  1:23 AM   Result Value Ref Range    Blood Culture Result No Growth 3 Days N/A           Problem list reviewed:  [place]

## 2018-12-22 NOTE — PROGRESS NOTES
[facility]  Progress Note     Romel Herzog Patient Status:  Inpatient    1960 MRN [id number]   [facility] 4NW-A Attending Stephane Church MD   Hosp Day # 3 PCP Claudia Portillo     Chief Complaint: leg tightness    S: Patient hours.         Imaging: Imaging data reviewed in Epic.     Medications:   • Phenytoin Sodium Extended  300 mg Oral Daily   • Vitamin C  500 mg Oral Daily   • Vitamin D3  2,000 Units Oral Daily   • ceFAZolin  2 g Intravenous Q8H   • enoxaparin  40 mg Subcuta

## 2018-12-23 VITALS
OXYGEN SATURATION: 95 % | WEIGHT: 168.5 LBS | HEART RATE: 96 BPM | BODY MASS INDEX: 33.08 KG/M2 | HEIGHT: 60 IN | SYSTOLIC BLOOD PRESSURE: 149 MMHG | TEMPERATURE: 99 F | RESPIRATION RATE: 20 BRPM | DIASTOLIC BLOOD PRESSURE: 91 MMHG

## 2018-12-23 PROCEDURE — 99239 HOSP IP/OBS DSCHRG MGMT >30: CPT | Performed by: HOSPITALIST

## 2018-12-23 NOTE — PROGRESS NOTES
NURSING DISCHARGE NOTE    Discharged Home via Ambulance. Accompanied staff  Belongings Taken by patient/family.

## 2018-12-23 NOTE — CM/SW NOTE
RN states pt is ready for discharge. VIV contacted [facility] for transportation, and ambulance arranged through [facility] ([phone]) at 2:30 PM.     VIV spoke with Nando Resendiz at pt's [facility] who is aware of pt's discharge.  VIV notified pt's mother of pt's dischar

## 2018-12-23 NOTE — PROGRESS NOTES
[facility]                INFECTIOUS DISEASE PROGRESS NOTE    Eddye Spotted Patient Status:  Inpatient    1960 MRN [id number]   [facility] 4NW-A Attending Flavio Ríos MD   Hosp Day # 4 PCP [facility] Microbiology    Hospital Encounter on 12/19/18   1.  BLOOD CULTURE     Status: None (Preliminary result)    Collection Time: 12/19/18  1:23 AM   Result Value Ref Range    Blood Culture Result No Growth 4 Days N/A           Problem list reviewed:  [place]

## 2018-12-23 NOTE — PLAN OF CARE
GASTROINTESTINAL - ADULT    • Minimal or absence of nausea and vomiting Progressing    • Maintains or returns to baseline bowel function Progressing        RISK FOR INFECTION - ADULT    • Absence of fever/infection during anticipated neutropenic period Pro

## 2018-12-24 NOTE — DISCHARGE SUMMARY
[facility] HOSPITAL[facility]  DISCHARGE SUMMARY     Sadia Velazquez Patient Status:  Inpatient    1960 MRN [id number]   [facility] 4NW-A Attending No att. providers found   Hosp Day # 4 PCP Jennifer Ybarra     Date of Admission: 2018  Mark She has no other complaints. She was noted to have left lower extremity cellulitis. She states that she is not able to see her legs and was not aware that she had any redness or swelling.   She has history of chronic lymphedema and recurrent cellulitis of as: DILANTIN      Take 300 mg by mouth daily. Refills:  0     Potassium Gluconate 595 (99 K) MG Tabs      Take 1 tablet by mouth daily. Refills:  0     Vitamin C 500 MG Tabs  Commonly known as:  VITAMIN C      Take 500 mg by mouth daily.    Refills:  0

## 2018-12-31 NOTE — CDS QUERY
Present on Admission (POA)  Mee Causey    Dear Doctor Miah Jin,   Clinical information (provided below) does not conclusively specify if the condition was Present On Admission (POA).  In order to apply the POA indicator to the

## 2019-01-30 ENCOUNTER — APPOINTMENT (OUTPATIENT)
Dept: CT IMAGING | Facility: HOSPITAL | Age: 59
DRG: 871 | End: 2019-01-30
Attending: EMERGENCY MEDICINE
Payer: MEDICARE

## 2019-01-30 ENCOUNTER — HOSPITAL ENCOUNTER (INPATIENT)
Facility: HOSPITAL | Age: 59
LOS: 3 days | Discharge: HOME HEALTH | DRG: 871 | End: 2019-02-02
Attending: EMERGENCY MEDICINE | Admitting: STUDENT IN AN ORGANIZED HEALTH CARE EDUCATION/TRAINING PROGRAM
Payer: MEDICARE

## 2019-01-30 ENCOUNTER — APPOINTMENT (OUTPATIENT)
Dept: ULTRASOUND IMAGING | Facility: HOSPITAL | Age: 59
DRG: 871 | End: 2019-01-30
Attending: STUDENT IN AN ORGANIZED HEALTH CARE EDUCATION/TRAINING PROGRAM
Payer: MEDICARE

## 2019-01-30 ENCOUNTER — APPOINTMENT (OUTPATIENT)
Dept: GENERAL RADIOLOGY | Facility: HOSPITAL | Age: 59
DRG: 871 | End: 2019-01-30
Attending: EMERGENCY MEDICINE
Payer: MEDICARE

## 2019-01-30 DIAGNOSIS — G40.909 SEIZURE DISORDER (HCC): ICD-10-CM

## 2019-01-30 DIAGNOSIS — J18.9 SEPSIS DUE TO PNEUMONIA (HCC): Primary | ICD-10-CM

## 2019-01-30 DIAGNOSIS — G80.0 SPASTIC QUADRIPLEGIC CEREBRAL PALSY (HCC): ICD-10-CM

## 2019-01-30 DIAGNOSIS — A41.9 SEPSIS DUE TO PNEUMONIA (HCC): Primary | ICD-10-CM

## 2019-01-30 LAB
ADENOVIRUS PCR:: NEGATIVE
ALBUMIN SERPL-MCNC: 3.7 G/DL (ref 3.1–4.5)
ALBUMIN/GLOB SERPL: 0.9 {RATIO} (ref 1–2)
ALP LIVER SERPL-CCNC: 85 U/L (ref 46–118)
ALT SERPL-CCNC: 15 U/L (ref 14–54)
ANION GAP SERPL CALC-SCNC: 8 MMOL/L (ref 0–18)
APTT PPP: 28.9 SECONDS (ref 26.1–34.6)
AST SERPL-CCNC: 15 U/L (ref 15–41)
B PERT DNA SPEC QL NAA+PROBE: NEGATIVE
BASOPHILS # BLD AUTO: 0.05 X10(3) UL (ref 0–0.2)
BASOPHILS NFR BLD AUTO: 0.3 %
BILIRUB SERPL-MCNC: 0.4 MG/DL (ref 0.1–2)
BILIRUB UR QL STRIP.AUTO: NEGATIVE
BUN BLD-MCNC: 16 MG/DL (ref 8–20)
BUN/CREAT SERPL: 25 (ref 10–20)
C PNEUM DNA SPEC QL NAA+PROBE: NEGATIVE
CALCIUM BLD-MCNC: 9.1 MG/DL (ref 8.3–10.3)
CHLORIDE SERPL-SCNC: 106 MMOL/L (ref 101–111)
CLARITY UR REFRACT.AUTO: CLEAR
CO2 SERPL-SCNC: 24 MMOL/L (ref 22–32)
COLOR UR AUTO: YELLOW
CORONAVIRUS 229E PCR:: NEGATIVE
CORONAVIRUS HKU1 PCR:: NEGATIVE
CORONAVIRUS NL63 PCR:: NEGATIVE
CORONAVIRUS OC43 PCR:: NEGATIVE
CREAT BLD-MCNC: 0.64 MG/DL (ref 0.55–1.02)
DEPRECATED RDW RBC AUTO: 45.5 FL (ref 35.1–46.3)
EOSINOPHIL # BLD AUTO: 0.05 X10(3) UL (ref 0–0.7)
EOSINOPHIL NFR BLD AUTO: 0.3 %
ERYTHROCYTE [DISTWIDTH] IN BLOOD BY AUTOMATED COUNT: 13.6 % (ref 11–15)
FLUAV RNA SPEC QL NAA+PROBE: NEGATIVE
FLUBV RNA SPEC QL NAA+PROBE: NEGATIVE
GLOBULIN PLAS-MCNC: 4.3 G/DL (ref 2.8–4.4)
GLUCOSE BLD-MCNC: 105 MG/DL (ref 70–99)
GLUCOSE UR STRIP.AUTO-MCNC: NEGATIVE MG/DL
HCT VFR BLD AUTO: 40.1 % (ref 35–48)
HGB BLD-MCNC: 13.3 G/DL (ref 12–16)
IMM GRANULOCYTES # BLD AUTO: 0.14 X10(3) UL (ref 0–1)
IMM GRANULOCYTES NFR BLD: 0.7 %
INR BLD: 1.02 (ref 0.9–1.1)
KETONES UR STRIP.AUTO-MCNC: NEGATIVE MG/DL
LACTIC ACID: 1.9 MMOL/L (ref 0.5–2)
LEUKOCYTE ESTERASE UR QL STRIP.AUTO: NEGATIVE
LIPASE: 148 U/L (ref 73–393)
LYMPHOCYTES # BLD AUTO: 0.86 X10(3) UL (ref 1–4)
LYMPHOCYTES NFR BLD AUTO: 4.6 %
M PROTEIN MFR SERPL ELPH: 8 G/DL (ref 6.4–8.2)
MCH RBC QN AUTO: 30.1 PG (ref 26–34)
MCHC RBC AUTO-ENTMCNC: 33.2 G/DL (ref 31–37)
MCV RBC AUTO: 90.7 FL (ref 80–100)
METAPNEUMOVIRUS PCR:: NEGATIVE
MONOCYTES # BLD AUTO: 0.77 X10(3) UL (ref 0.1–1)
MONOCYTES NFR BLD AUTO: 4.1 %
MYCOPLASMA PNEUMONIA PCR:: NEGATIVE
NEUTROPHILS # BLD AUTO: 16.9 X10 (3) UL (ref 1.5–7.7)
NEUTROPHILS # BLD AUTO: 16.9 X10(3) UL (ref 1.5–7.7)
NEUTROPHILS NFR BLD AUTO: 90 %
NITRITE UR QL STRIP.AUTO: NEGATIVE
OSMOLALITY SERPL CALC.SUM OF ELEC: 288 MOSM/KG (ref 275–295)
PARAINFLUENZA 1 PCR:: NEGATIVE
PARAINFLUENZA 2 PCR:: NEGATIVE
PARAINFLUENZA 3 PCR:: NEGATIVE
PARAINFLUENZA 4 PCR:: NEGATIVE
PH UR STRIP.AUTO: 7 [PH] (ref 4.5–8)
PLATELET # BLD AUTO: 156 10(3)UL (ref 150–450)
POTASSIUM SERPL-SCNC: 4.2 MMOL/L (ref 3.6–5.1)
PROT UR STRIP.AUTO-MCNC: NEGATIVE MG/DL
PSA SERPL DL<=0.01 NG/ML-MCNC: 13.8 SECONDS (ref 12.4–14.7)
RBC # BLD AUTO: 4.42 X10(6)UL (ref 3.8–5.3)
RBC UR QL AUTO: NEGATIVE
RHINOVIRUS/ENTERO PCR:: NEGATIVE
RSV RNA SPEC QL NAA+PROBE: NEGATIVE
SODIUM SERPL-SCNC: 138 MMOL/L (ref 136–144)
SP GR UR STRIP.AUTO: 1.02 (ref 1–1.03)
UROBILINOGEN UR STRIP.AUTO-MCNC: <2 MG/DL
WBC # BLD AUTO: 18.8 X10(3) UL (ref 4–11)

## 2019-01-30 PROCEDURE — 71045 X-RAY EXAM CHEST 1 VIEW: CPT | Performed by: EMERGENCY MEDICINE

## 2019-01-30 PROCEDURE — 93970 EXTREMITY STUDY: CPT | Performed by: STUDENT IN AN ORGANIZED HEALTH CARE EDUCATION/TRAINING PROGRAM

## 2019-01-30 PROCEDURE — 99222 1ST HOSP IP/OBS MODERATE 55: CPT | Performed by: STUDENT IN AN ORGANIZED HEALTH CARE EDUCATION/TRAINING PROGRAM

## 2019-01-30 PROCEDURE — 74177 CT ABD & PELVIS W/CONTRAST: CPT | Performed by: EMERGENCY MEDICINE

## 2019-01-30 RX ORDER — SODIUM CHLORIDE 9 MG/ML
INJECTION, SOLUTION INTRAVENOUS CONTINUOUS
Status: DISCONTINUED | OUTPATIENT
Start: 2019-01-30 | End: 2019-01-31

## 2019-01-30 RX ORDER — METOCLOPRAMIDE HYDROCHLORIDE 5 MG/ML
10 INJECTION INTRAMUSCULAR; INTRAVENOUS EVERY 8 HOURS PRN
Status: DISCONTINUED | OUTPATIENT
Start: 2019-01-30 | End: 2019-02-02

## 2019-01-30 RX ORDER — ENOXAPARIN SODIUM 100 MG/ML
40 INJECTION SUBCUTANEOUS DAILY
Status: DISCONTINUED | OUTPATIENT
Start: 2019-01-30 | End: 2019-02-02

## 2019-01-30 RX ORDER — ONDANSETRON 2 MG/ML
4 INJECTION INTRAMUSCULAR; INTRAVENOUS EVERY 4 HOURS PRN
Status: DISCONTINUED | OUTPATIENT
Start: 2019-01-30 | End: 2019-01-30

## 2019-01-30 RX ORDER — ACETAMINOPHEN 325 MG/1
650 TABLET ORAL EVERY 6 HOURS PRN
Status: DISCONTINUED | OUTPATIENT
Start: 2019-01-30 | End: 2019-02-02

## 2019-01-30 RX ORDER — ACETAMINOPHEN 500 MG
1000 TABLET ORAL ONCE
Status: DISCONTINUED | OUTPATIENT
Start: 2019-01-30 | End: 2019-01-30

## 2019-01-30 RX ORDER — ACETAMINOPHEN 650 MG/1
SUPPOSITORY RECTAL
Status: DISPENSED
Start: 2019-01-30 | End: 2019-01-30

## 2019-01-30 RX ORDER — ONDANSETRON 2 MG/ML
4 INJECTION INTRAMUSCULAR; INTRAVENOUS EVERY 6 HOURS PRN
Status: DISCONTINUED | OUTPATIENT
Start: 2019-01-30 | End: 2019-02-02

## 2019-01-30 RX ORDER — SODIUM CHLORIDE 9 MG/ML
INJECTION, SOLUTION INTRAVENOUS CONTINUOUS
Status: DISCONTINUED | OUTPATIENT
Start: 2019-01-30 | End: 2019-01-30

## 2019-01-30 RX ORDER — ACETAMINOPHEN 650 MG/1
975 SUPPOSITORY RECTAL ONCE
Status: COMPLETED | OUTPATIENT
Start: 2019-01-30 | End: 2019-01-30

## 2019-01-30 RX ORDER — PHENYTOIN SODIUM 100 MG/1
300 CAPSULE, EXTENDED RELEASE ORAL DAILY
Status: DISCONTINUED | OUTPATIENT
Start: 2019-01-30 | End: 2019-02-02

## 2019-01-30 NOTE — SLP NOTE
ADULT SWALLOWING EVALUATION    ASSESSMENT    ASSESSMENT/OVERALL IMPRESSION:  Orders received for bedside swallow evaluation. Pt was admitted with pneumonia and possible aspiration. Per RN, pt has vomited in ED and x1 this date.   Pt self reports a poor ap done.  Reviewed aspiration precautions with pt with good understanding verbalized. RECOMMENDATIONS   Diet Recommendations - Solids: Mechanical soft chopped  Diet Recommendations - Liquid:  Thin                        Compensatory Strategies Recommen Reduced right facial;Reduced right lingual;Reduced left lingual  Rate of Motion: Reduced    Voice Quality: Harsh/Strained;Breathy  Respiratory Status: Unlabored  Consistencies Trialed:  Thin liquids;Puree;Hard solid  Method of Presentation: Staff/Clinician

## 2019-01-30 NOTE — ED PROVIDER NOTES
Patient Seen in: [facility] Emergency Department    History   Patient presents with:  Nausea/Vomiting/Diarrhea (gastrointestinal)  Fever (infectious)    Stated Complaint: Vomiting     HPI    59-year-old female with a history of cerebral palsy, histo Uvula is midline. Tympanic membranes are clear without evidence of injection or perforation. Neck exam: Supple. There is no lymphadenopathy or carotid bruit. Cardiovascular exam: Regular rate and rhythm. There are no murmurs, rubs, gallops.   Lungs: Cl lower lung field as well as in the right mid lung field. Findings most worrisome for bronchopneumonia and/or aspiration given clinical history. No pneumothorax or pleural effusion. Heart size is normal.  Degenerative change in the spine.       Results wi [facility]    Disposition:  Admit  1/30/2019  6:43 am    Follow-up:  No follow-up provider specified.       Medications Prescribed:  Current Discharge Medication List        Present on Admission  Date Reviewed: 12/20/2018          ICD-10-CM Noted POA    Sepsis d

## 2019-01-30 NOTE — H&P
[facility]PITAL[facility]  History and Physical     Doyce Hence Patient Status:  Emergency    1960 MRN [id number]   Location [address] [facility] Attending Riya Lacy MD   Hosp Day # 0 PCP Sadie Morrison     Chief Complaint: FEver Review of Systems:   A comprehensive 14 point review of systems was completed. Pertinent positives and negatives noted in the HPI.     Physical Exam:    /71   Pulse 102   Temp 98.3 °F (36.8 °C) (Temporal)   Ht 5' (1.524 m)   Wt 167 lb 8.8 oz Lovenox  · CODE status: full  · Bunch: no    Plan of care discussed with pt, COURTNEY Whitten MD  1/30/2019

## 2019-01-30 NOTE — PROGRESS NOTES
pts temp is  101.2, also pt is still nauseated, zofran IV given, pt staed it didn't help, Reglan will be given now.  Dr Barbara Servin was paged regarding this

## 2019-01-30 NOTE — PROGRESS NOTES
[facility]  Progress Note     Ethyl Lacks Patient Status:  Inpatient    1960 MRN [id number]   [facility] 5NW-A Attending Felix Cyr, DO   Hosp Day # 0 PCP Amy Mejia     Chief Complaint: Fever    S: Patient seen an VFSS  2. IVF  2. Intractable N/V  1. CT A/P negative  2. PRN anti-emetics  3. Gentle IVF  3. B/L KM/lymphedema  1. Venous doppler ultrasound pending   4. Cerebral palsy  5. Seizure disorder  1.  Phenytoin     Quality:  · DVT Prophylaxis: lovenox  · CODE st

## 2019-01-30 NOTE — ED NOTES
Pt tolerated straight cath well, tylenol suppository given per order, pt changed into clean dry sheets, pads and gown, pt repositioned on her right side per pt request for comfort, supported with pillows and blankets.

## 2019-01-31 ENCOUNTER — APPOINTMENT (OUTPATIENT)
Dept: GENERAL RADIOLOGY | Facility: HOSPITAL | Age: 59
DRG: 871 | End: 2019-01-31
Attending: STUDENT IN AN ORGANIZED HEALTH CARE EDUCATION/TRAINING PROGRAM
Payer: MEDICARE

## 2019-01-31 LAB
ANION GAP SERPL CALC-SCNC: 9 MMOL/L (ref 0–18)
BASOPHILS # BLD AUTO: 0.02 X10(3) UL (ref 0–0.2)
BASOPHILS NFR BLD AUTO: 0.2 %
BUN BLD-MCNC: 11 MG/DL (ref 8–20)
BUN/CREAT SERPL: 23.9 (ref 10–20)
CALCIUM BLD-MCNC: 8.5 MG/DL (ref 8.3–10.3)
CHLORIDE SERPL-SCNC: 108 MMOL/L (ref 101–111)
CO2 SERPL-SCNC: 24 MMOL/L (ref 22–32)
CREAT BLD-MCNC: 0.46 MG/DL (ref 0.55–1.02)
DEPRECATED RDW RBC AUTO: 44.9 FL (ref 35.1–46.3)
EOSINOPHIL # BLD AUTO: 0.01 X10(3) UL (ref 0–0.7)
EOSINOPHIL NFR BLD AUTO: 0.1 %
ERYTHROCYTE [DISTWIDTH] IN BLOOD BY AUTOMATED COUNT: 13.7 % (ref 11–15)
GLUCOSE BLD-MCNC: 93 MG/DL (ref 70–99)
HCT VFR BLD AUTO: 35.3 % (ref 35–48)
HGB BLD-MCNC: 11.7 G/DL (ref 12–16)
IMM GRANULOCYTES # BLD AUTO: 0.05 X10(3) UL (ref 0–1)
IMM GRANULOCYTES NFR BLD: 0.5 %
LYMPHOCYTES # BLD AUTO: 0.72 X10(3) UL (ref 1–4)
LYMPHOCYTES NFR BLD AUTO: 6.6 %
MCH RBC QN AUTO: 29.9 PG (ref 26–34)
MCHC RBC AUTO-ENTMCNC: 33.1 G/DL (ref 31–37)
MCV RBC AUTO: 90.3 FL (ref 80–100)
MONOCYTES # BLD AUTO: 0.55 X10(3) UL (ref 0.1–1)
MONOCYTES NFR BLD AUTO: 5 %
NEUTROPHILS # BLD AUTO: 9.6 X10 (3) UL (ref 1.5–7.7)
NEUTROPHILS # BLD AUTO: 9.6 X10(3) UL (ref 1.5–7.7)
NEUTROPHILS NFR BLD AUTO: 87.6 %
OSMOLALITY SERPL CALC.SUM OF ELEC: 291 MOSM/KG (ref 275–295)
PLATELET # BLD AUTO: 118 10(3)UL (ref 150–450)
POTASSIUM SERPL-SCNC: 3.3 MMOL/L (ref 3.6–5.1)
RBC # BLD AUTO: 3.91 X10(6)UL (ref 3.8–5.3)
SODIUM SERPL-SCNC: 141 MMOL/L (ref 136–144)
WBC # BLD AUTO: 11 X10(3) UL (ref 4–11)

## 2019-01-31 PROCEDURE — 74230 X-RAY XM SWLNG FUNCJ C+: CPT | Performed by: STUDENT IN AN ORGANIZED HEALTH CARE EDUCATION/TRAINING PROGRAM

## 2019-01-31 PROCEDURE — 99232 SBSQ HOSP IP/OBS MODERATE 35: CPT | Performed by: HOSPITALIST

## 2019-01-31 NOTE — PLAN OF CARE
Problem: Impaired Swallowing  Goal: Minimize aspiration risk  Interventions:  - Patient should be alert and upright for all feedings (90 degrees preferred)  - Offer food and liquids at a slow rate  - Encourage small bites of food and small straw sips of li

## 2019-01-31 NOTE — HOME CARE LIAISON
Referral from Tiffani, [address] with patient to offer home health when discharged home. Patient agreeable to [facility] for RN  services. Brochure and contact information given to patient for any further questions/concerns.   Thanks for the referral.

## 2019-01-31 NOTE — SLP NOTE
ADULT VIDEOFLUOROSCOPIC SWALLOWING STUDY    Admission Date: 1/30/2019  Evaluation Date: 01/31/19  Radiologist: Dr Doshi Grammes: Mechanical soft chopped, extra sauces/gravies/broth for extra moistness  Diet Recomme (VFSS - Thin Liquids): Impaired  Bolus Retrieval (VFSS - Thin Liquids): Intact  Bilabial Seal (VFSS - Thin Liquids): Impaired  Bolus Formation (VFSS - Thin Liquids): Impaired  Bolus Propulsion (VFSS - Thin Liquids):  Intact  Retention (VFSS - Thin Liquids): swallow strategies independently over 2 session(s). NEW     PLAN: Con't skilled SLP intervention short term for pt education/training and diet texture analysis.      EDUCATION/INSTRUCTION  Reviewed results and recommendations with patient/family/caregive

## 2019-01-31 NOTE — PROGRESS NOTES
[facility]PITAL[facility]  Progress Note     Barbara Helms Patient Status:  Inpatient    1960 MRN [id number]   [facility] 5NW-A Attending David Dean, [address] [facility] Day # 1 PCP Arturo Samuels     Chief Complaint: Fever    S: Patient seen an mg Oral Daily       ASSESSMENT / PLAN:     1. Sepsis due to pneumonia and possible cellulitis of LLE  1. Continue empiric antibiotics  1. Passed VFSS  2. Intractable N/V  1. Resolved   2. CT A/P negative  3. PRN anti-emetics  3.  Chronic B/L LE lymphedema

## 2019-01-31 NOTE — PROGRESS NOTES
Pt is alert and oriented, is forgetful., garbled speech. She is currently off floor for video swallow. RA 95%. K is 3.3 and being replaced. Lungs sound cleawr. Yoshin IV .  Pt is bedbound, she states she lives alone and gets around in her electric wheel [facility]

## 2019-01-31 NOTE — CM/SW NOTE
Patient is familiar to me from previous hospital admissions. Pt has history of CP, quadriplegia, lives alone and has multiple DME and home caregiver services through [facility].   She has history of HHC with [facility].  [facility]

## 2019-02-01 ENCOUNTER — APPOINTMENT (OUTPATIENT)
Dept: GENERAL RADIOLOGY | Facility: HOSPITAL | Age: 59
DRG: 871 | End: 2019-02-01
Attending: HOSPITALIST
Payer: MEDICARE

## 2019-02-01 LAB
POTASSIUM SERPL-SCNC: 3.8 MMOL/L (ref 3.6–5.1)
PROCALCITONIN SERPL-MCNC: <0.11 NG/ML

## 2019-02-01 PROCEDURE — 71046 X-RAY EXAM CHEST 2 VIEWS: CPT | Performed by: HOSPITALIST

## 2019-02-01 PROCEDURE — 99232 SBSQ HOSP IP/OBS MODERATE 35: CPT | Performed by: HOSPITALIST

## 2019-02-01 RX ORDER — POTASSIUM CHLORIDE 20 MEQ/1
40 TABLET, EXTENDED RELEASE ORAL ONCE
Status: COMPLETED | OUTPATIENT
Start: 2019-02-01 | End: 2019-02-01

## 2019-02-01 RX ORDER — FUROSEMIDE 10 MG/ML
20 INJECTION INTRAMUSCULAR; INTRAVENOUS ONCE
Status: COMPLETED | OUTPATIENT
Start: 2019-02-01 | End: 2019-02-01

## 2019-02-01 NOTE — CM/SW NOTE
Met with pt to discuss DC planning. Pt confirmed home support - caregiver and DME. Pt seen by [facility] liaison and [facility]   Pt expressed concern that when she is ready for discharge she will need to contact her caregiver to

## 2019-02-01 NOTE — SLP NOTE
SPEECH DAILY NOTE - INPATIENT    ASSESSMENT & PLAN   ASSESSMENT  Pt seen for dysphagia tx to assess tolerance with recommended diet, ensure proper utilization of aspiration precautions and provide pt/family education. Repeat CXR ordered/pending.  PCT 2/1/19 precautions and swallow strategies independently over 1 session(s).    Modified goal; Met     FOLLOW UP  Session: 1/1  D/C from  at this time. Please re-consult should status change.      If you have any questions, please contact [phone] [employer] [facility]

## 2019-02-01 NOTE — PLAN OF CARE
RESPIRATORY - ADULT    • Achieves optimal ventilation and oxygenation Progressing          Impaired Swallowing    • Minimize aspiration risk Progressing          SAFETY ADULT - FALL    • Free from fall injury Progressing          PAIN - ADULT    • Andie Ray breath)     Gastroenteritis     Hyponatremia     Thrombocytopenia (HCC)     Leukocytosis     Sepsis affecting skin (HCC)     Cellulitis, unspecified cellulitis site     Vomiting without nausea, intractability of vomiting not specified, unspecified vomiting

## 2019-02-01 NOTE — PROGRESS NOTES
[facility]PITAL[facility]  Progress Note     Sanchez Segun Patient Status:  Inpatient    1960 MRN [id number]   [facility] 5NW-A Attending Marilyn Parra, [facility] Day # 2 PCP Harpreet Craig     Chief Complaint: Fever    S: Patient seen an Once   • Potassium Chloride ER  40 mEq Oral Once   • enoxaparin  40 mg Subcutaneous Daily   • piperacillin-tazobactam  3.375 g Intravenous Q8H   • Phenytoin Sodium Extended  300 mg Oral Daily       ASSESSMENT / PLAN:     1.  Sepsis due to pneumonia and poss

## 2019-02-01 NOTE — PLAN OF CARE
Impaired Swallowing    • Minimize aspiration risk Progressing        PAIN - ADULT    • Verbalizes/displays adequate comfort level or patient's stated pain goal Progressing        Patient/Family Goals    • Patient/Family Long Term Goal Progressing    • Rowena

## 2019-02-02 VITALS
TEMPERATURE: 98 F | HEIGHT: 60 IN | BODY MASS INDEX: 32.89 KG/M2 | SYSTOLIC BLOOD PRESSURE: 151 MMHG | OXYGEN SATURATION: 96 % | HEART RATE: 83 BPM | DIASTOLIC BLOOD PRESSURE: 96 MMHG | RESPIRATION RATE: 20 BRPM | WEIGHT: 167.56 LBS

## 2019-02-02 LAB
PLATELET # BLD AUTO: 153 10(3)UL (ref 150–450)
POTASSIUM SERPL-SCNC: 3.9 MMOL/L (ref 3.6–5.1)

## 2019-02-02 PROCEDURE — 99239 HOSP IP/OBS DSCHRG MGMT >30: CPT | Performed by: HOSPITALIST

## 2019-02-02 RX ORDER — PENICILLIN V POTASSIUM 500 MG/1
500 TABLET ORAL 2 TIMES DAILY
Qty: 60 TABLET | Refills: 11 | Status: ON HOLD | OUTPATIENT
Start: 2019-02-10 | End: 2019-03-07

## 2019-02-02 RX ORDER — AMOXICILLIN AND CLAVULANATE POTASSIUM 875; 125 MG/1; MG/1
1 TABLET, FILM COATED ORAL 2 TIMES DAILY
Qty: 14 TABLET | Refills: 0 | Status: SHIPPED | OUTPATIENT
Start: 2019-02-02 | End: 2019-02-02

## 2019-02-02 RX ORDER — AMOXICILLIN AND CLAVULANATE POTASSIUM 875; 125 MG/1; MG/1
1 TABLET, FILM COATED ORAL 2 TIMES DAILY
Qty: 14 TABLET | Refills: 0 | Status: SHIPPED | OUTPATIENT
Start: 2019-02-02 | End: 2019-02-09

## 2019-02-02 NOTE — CM/SW NOTE
VIV contacted by RN re: d/c for today. Pt will require ambulance transport home. VIV contacted pt's caregiver (through [facility])- Patsy. She confirms that she will be at pt's home with a 1PM d/c time.     Ambulance transport requested through [facility]

## 2019-02-02 NOTE — DISCHARGE SUMMARY
[facility] HOSPITALIST  DISCHARGE SUMMARY     Marilia Rodas Patient Status:  Inpatient    1960 MRN [id number]   [facility] 5NW-A Attending Bryon Akins, DO   Hosp Day # 3 PCP Sofia Daly     Date of Admission: 2019  Date of Jesus Alberto Bower penicillin v potassium 500 MG Tabs  Commonly known as:  VEETID  Start taking on:  2/10/2019  What changed:    · additional instructions  · These instructions start on 2/10/2019.  If you are unsure what to do until then, ask your doctor or other care provi guarding. Neurologic: No focal neurological deficits.    Musculoskeletal: Contracted upper extremities   Extremities: B/L LE edema, LLE erythema distal to ankle  - improved   ---------------------------------------------------------------------------------

## 2019-02-02 NOTE — CM/SW NOTE
02/02/19 1500   Discharge disposition   Expected discharge disposition Home-Health   Name of Facillity/Home Care/Hospice ([address] [facility])   Discharge transportation Other (comment)  [facility] ambulance ([facility]))   AVS sent via [facility] to [address] [place] [address]

## 2019-02-02 NOTE — PROGRESS NOTES
NURSING DISCHARGE NOTE    Discharged Home via Ambulance. Accompanied by Support staff  Belongings Taken by patient/family. Discharge education completed with patient and patients caregiver Aminatada.  New prescription and disease information handouts p

## 2019-03-04 ENCOUNTER — APPOINTMENT (OUTPATIENT)
Dept: ULTRASOUND IMAGING | Facility: HOSPITAL | Age: 59
DRG: 872 | End: 2019-03-04
Attending: EMERGENCY MEDICINE
Payer: MEDICARE

## 2019-03-04 ENCOUNTER — APPOINTMENT (OUTPATIENT)
Dept: GENERAL RADIOLOGY | Facility: HOSPITAL | Age: 59
DRG: 872 | End: 2019-03-04
Attending: EMERGENCY MEDICINE
Payer: MEDICARE

## 2019-03-04 ENCOUNTER — HOSPITAL ENCOUNTER (INPATIENT)
Facility: HOSPITAL | Age: 59
LOS: 3 days | Discharge: HOME | DRG: 872 | End: 2019-03-07
Attending: EMERGENCY MEDICINE | Admitting: INTERNAL MEDICINE
Payer: MEDICARE

## 2019-03-04 DIAGNOSIS — A41.9 SEPSIS AFFECTING SKIN: ICD-10-CM

## 2019-03-04 DIAGNOSIS — L03.116 CELLULITIS OF LEFT LOWER EXTREMITY: Primary | ICD-10-CM

## 2019-03-04 LAB
ALBUMIN SERPL-MCNC: 4 G/DL (ref 3.4–5)
ALBUMIN/GLOB SERPL: 0.8 {RATIO} (ref 1–2)
ALP LIVER SERPL-CCNC: 92 U/L (ref 46–118)
ALT SERPL-CCNC: 18 U/L (ref 13–56)
ANION GAP SERPL CALC-SCNC: 8 MMOL/L (ref 0–18)
APTT PPP: 27.9 SECONDS (ref 26.1–34.6)
AST SERPL-CCNC: 14 U/L (ref 15–37)
BASOPHILS # BLD AUTO: 0.03 X10(3) UL (ref 0–0.2)
BASOPHILS NFR BLD AUTO: 0.2 %
BILIRUB SERPL-MCNC: 0.5 MG/DL (ref 0.1–2)
BILIRUB UR QL STRIP.AUTO: NEGATIVE
BUN BLD-MCNC: 16 MG/DL (ref 7–18)
BUN/CREAT SERPL: 25 (ref 10–20)
CALCIUM BLD-MCNC: 9.5 MG/DL (ref 8.5–10.1)
CHLORIDE SERPL-SCNC: 108 MMOL/L (ref 98–107)
CO2 SERPL-SCNC: 22 MMOL/L (ref 21–32)
CREAT BLD-MCNC: 0.64 MG/DL (ref 0.55–1.02)
DEPRECATED RDW RBC AUTO: 45.7 FL (ref 35.1–46.3)
EOSINOPHIL # BLD AUTO: 0.01 X10(3) UL (ref 0–0.7)
EOSINOPHIL NFR BLD AUTO: 0.1 %
ERYTHROCYTE [DISTWIDTH] IN BLOOD BY AUTOMATED COUNT: 13.5 % (ref 11–15)
GLOBULIN PLAS-MCNC: 5 G/DL (ref 2.8–4.4)
GLUCOSE BLD-MCNC: 90 MG/DL (ref 70–99)
GLUCOSE UR STRIP.AUTO-MCNC: NEGATIVE MG/DL
HCT VFR BLD AUTO: 47 % (ref 35–48)
HGB BLD-MCNC: 15.4 G/DL (ref 12–16)
IMM GRANULOCYTES # BLD AUTO: 0.14 X10(3) UL (ref 0–1)
IMM GRANULOCYTES NFR BLD: 0.9 %
INR BLD: 1.04 (ref 0.9–1.1)
LACTATE SERPL-SCNC: 1.8 MMOL/L (ref 0.4–2)
LACTATE SERPL-SCNC: 2.2 MMOL/L (ref 0.4–2)
LACTATE SERPL-SCNC: 3.2 MMOL/L (ref 0.4–2)
LEUKOCYTE ESTERASE UR QL STRIP.AUTO: NEGATIVE
LYMPHOCYTES # BLD AUTO: 0.54 X10(3) UL (ref 1–4)
LYMPHOCYTES NFR BLD AUTO: 3.4 %
M PROTEIN MFR SERPL ELPH: 9 G/DL (ref 6.4–8.2)
MCH RBC QN AUTO: 30.3 PG (ref 26–34)
MCHC RBC AUTO-ENTMCNC: 32.8 G/DL (ref 31–37)
MCV RBC AUTO: 92.3 FL (ref 80–100)
MONOCYTES # BLD AUTO: 0.64 X10(3) UL (ref 0.1–1)
MONOCYTES NFR BLD AUTO: 4 %
NEUTROPHILS # BLD AUTO: 14.5 X10 (3) UL (ref 1.5–7.7)
NEUTROPHILS # BLD AUTO: 14.5 X10(3) UL (ref 1.5–7.7)
NEUTROPHILS NFR BLD AUTO: 91.4 %
NITRITE UR QL STRIP.AUTO: NEGATIVE
OSMOLALITY SERPL CALC.SUM OF ELEC: 287 MOSM/KG (ref 275–295)
PH UR STRIP.AUTO: 5 [PH] (ref 4.5–8)
PLATELET # BLD AUTO: 135 10(3)UL (ref 150–450)
POTASSIUM SERPL-SCNC: 4 MMOL/L (ref 3.5–5.1)
PROT UR STRIP.AUTO-MCNC: NEGATIVE MG/DL
PSA SERPL DL<=0.01 NG/ML-MCNC: 14 SECONDS (ref 12.5–14.7)
RBC # BLD AUTO: 5.09 X10(6)UL (ref 3.8–5.3)
RBC UR QL AUTO: NEGATIVE
SODIUM SERPL-SCNC: 138 MMOL/L (ref 136–145)
SP GR UR STRIP.AUTO: 1.04 (ref 1–1.03)
UROBILINOGEN UR STRIP.AUTO-MCNC: <2 MG/DL
WBC # BLD AUTO: 15.9 X10(3) UL (ref 4–11)

## 2019-03-04 PROCEDURE — 71045 X-RAY EXAM CHEST 1 VIEW: CPT | Performed by: EMERGENCY MEDICINE

## 2019-03-04 PROCEDURE — 99223 1ST HOSP IP/OBS HIGH 75: CPT | Performed by: INTERNAL MEDICINE

## 2019-03-04 PROCEDURE — 93971 EXTREMITY STUDY: CPT | Performed by: EMERGENCY MEDICINE

## 2019-03-04 RX ORDER — ONDANSETRON 2 MG/ML
4 INJECTION INTRAMUSCULAR; INTRAVENOUS EVERY 6 HOURS PRN
Status: DISCONTINUED | OUTPATIENT
Start: 2019-03-04 | End: 2019-03-08

## 2019-03-04 RX ORDER — ACETAMINOPHEN 160 MG
2000 TABLET,DISINTEGRATING ORAL DAILY
Status: DISCONTINUED | OUTPATIENT
Start: 2019-03-04 | End: 2019-03-08

## 2019-03-04 RX ORDER — SODIUM CHLORIDE 9 MG/ML
INJECTION, SOLUTION INTRAVENOUS CONTINUOUS
Status: ACTIVE | OUTPATIENT
Start: 2019-03-04 | End: 2019-03-04

## 2019-03-04 RX ORDER — ACETAMINOPHEN 325 MG/1
650 TABLET ORAL EVERY 6 HOURS PRN
Status: DISCONTINUED | OUTPATIENT
Start: 2019-03-04 | End: 2019-03-08

## 2019-03-04 RX ORDER — ACETAMINOPHEN 500 MG
1000 TABLET ORAL ONCE
Status: COMPLETED | OUTPATIENT
Start: 2019-03-04 | End: 2019-03-04

## 2019-03-04 RX ORDER — SODIUM CHLORIDE 9 MG/ML
INJECTION, SOLUTION INTRAVENOUS CONTINUOUS
Status: DISCONTINUED | OUTPATIENT
Start: 2019-03-04 | End: 2019-03-07

## 2019-03-04 RX ORDER — ASCORBIC ACID 500 MG
500 TABLET ORAL DAILY
Status: DISCONTINUED | OUTPATIENT
Start: 2019-03-04 | End: 2019-03-08

## 2019-03-04 RX ORDER — HEPARIN SODIUM 5000 [USP'U]/ML
5000 INJECTION, SOLUTION INTRAVENOUS; SUBCUTANEOUS EVERY 8 HOURS SCHEDULED
Status: DISCONTINUED | OUTPATIENT
Start: 2019-03-04 | End: 2019-03-08

## 2019-03-04 RX ORDER — METOCLOPRAMIDE HYDROCHLORIDE 5 MG/ML
10 INJECTION INTRAMUSCULAR; INTRAVENOUS EVERY 8 HOURS PRN
Status: DISCONTINUED | OUTPATIENT
Start: 2019-03-04 | End: 2019-03-08

## 2019-03-04 RX ORDER — CEFAZOLIN SODIUM/WATER 2 G/20 ML
2 SYRINGE (ML) INTRAVENOUS EVERY 8 HOURS
Status: DISCONTINUED | OUTPATIENT
Start: 2019-03-04 | End: 2019-03-08

## 2019-03-04 RX ORDER — PHENYTOIN SODIUM 100 MG/1
300 CAPSULE, EXTENDED RELEASE ORAL DAILY
Status: DISCONTINUED | OUTPATIENT
Start: 2019-03-04 | End: 2019-03-08

## 2019-03-04 NOTE — PROGRESS NOTES
NURSING ADMISSION NOTE      Patient admitted via Cart  Oriented to room. Safety precautions initiated. Bed in low position. Call light in reach. Pt admitted to room 433 alert and oriented. Pt denies pain.  C/o nausea and one episode of emesis not

## 2019-03-04 NOTE — ED NOTES
Pt incontinent of urine. Personal care given. Straight cath performed w/ only 20 ml of clear yellow urine.

## 2019-03-04 NOTE — CONSULTS
INFECTIOUS DISEASE CONSULT NOTE    Tushar Wliliamson Patient Status:  Inpatient    1960 MRN [id number]   [facility] 4NW-A Attending Reshma Bermudez MD   Hosp Day # 0 PCP Zara Barlow HCl (ZOFRAN) injection 4 mg, 4 mg, Intravenous, Q6H PRN  •  Metoclopramide HCl (REGLAN) injection 10 mg, 10 mg, Intravenous, Q8H PRN  •  Piperacillin Sod-Tazobactam So (ZOSYN) 3.375 g in dextrose 5 % 100 mL ADD-vantage, 3.375 g, Intravenous, Q8H    Review Negative         Microbiology    Reviewed in EMR,   No results found for this visit on 03/04/19.       Radiology: Us Venous Doppler Leg Left - Diag Img (cpt=93971)    Result Date: 3/4/2019  PROCEDURE:  US VENOUS DOPPLER LEG LEFT - DIAG IMG (CPT=93971)  COMP the spine. CONCLUSION:  No active cardiopulmonary process identified. Dictated by: Lui Hernandez MD on 3/04/2019 at 10:05     Approved by: Lui Hernandez MD          ASSESSMENT:     1.  LLE cellulitis  -usually due to GPC (mainly strep sp), well

## 2019-03-04 NOTE — ED PROVIDER NOTES
Patient Seen in: [facility] Emergency Department    History   Patient presents with:  Fever (infectious)  Cellulitis (integumentary, infectious)  Nausea/Vomiting/Diarrhea (gastrointestinal)    Stated Complaint:     HPI    59-year-old female comes to clear, neck supple, no JVD, trachea midline, No LAD  Heart: S1S2 normal. No murmurs, regular rate and rhythm  Lungs: Clear to auscultation bilaterally  Abdomen: Soft nontender nondistended normal active bowel sounds without rebound, guarding or masses note RAINBOW DRAW LAVENDER   RAINBOW DRAW GOLD   RAINBOW DRAW BLUE   BLOOD CULTURE   BLOOD CULTURE   URINE CULTURE, ROUTINE          Us Venous Doppler Leg Left - Diag Img (cpt=93971)    Result Date: 3/4/2019  PROCEDURE:  US VENOUS DOPPLER LEG LEFT - DIAG IMG Degenerative changes in the spine. CONCLUSION:  No active cardiopulmonary process identified.     Dictated by: Candido Levy MD on 3/04/2019 at 10:05     Approved by: Candido Levy MD            Medications   sodium chloride 0.9% IV bolus 2,280 mL

## 2019-03-04 NOTE — H&P
[facility] HOSPITALIST  History and Physical     Sanchez Cast Patient Status:  Emergency    1960 MRN [id number]   Location 6[facility] Attending Kamron Perry MD   Hosp Day # 0 PCP Harpreet Craig     Chief Complaint Potassium Gluconate 595 (99 K) MG Oral Tab Take 1 tablet by mouth daily. Disp:  Rfl:    Phenytoin Sodium Extended 100 MG Oral Cap Take 300 mg by mouth daily.  Disp:  Rfl:        Review of Systems:   A comprehensive 14 point review of systems was completed Epic.      ASSESSMENT / PLAN:     1. Sepsis  1. Continue aggressive hydration  2. Continue IV abx, started on Zosyn in ED, can likely decreased to Ancef  3. Follow cultures  4. Trend lactate  2. Recurrent LLE Cellulitis  1. Continue IV abx, ?  Transition to

## 2019-03-05 LAB
ANION GAP SERPL CALC-SCNC: 6 MMOL/L (ref 0–18)
BASOPHILS # BLD AUTO: 0.03 X10(3) UL (ref 0–0.2)
BASOPHILS NFR BLD AUTO: 0.3 %
BUN BLD-MCNC: 9 MG/DL (ref 7–18)
BUN/CREAT SERPL: 25 (ref 10–20)
CALCIUM BLD-MCNC: 8 MG/DL (ref 8.5–10.1)
CHLORIDE SERPL-SCNC: 113 MMOL/L (ref 98–107)
CO2 SERPL-SCNC: 21 MMOL/L (ref 21–32)
CREAT BLD-MCNC: 0.36 MG/DL (ref 0.55–1.02)
DEPRECATED RDW RBC AUTO: 46 FL (ref 35.1–46.3)
EOSINOPHIL # BLD AUTO: 0.02 X10(3) UL (ref 0–0.7)
EOSINOPHIL NFR BLD AUTO: 0.2 %
ERYTHROCYTE [DISTWIDTH] IN BLOOD BY AUTOMATED COUNT: 13.4 % (ref 11–15)
GLUCOSE BLD-MCNC: 92 MG/DL (ref 70–99)
HCT VFR BLD AUTO: 34.9 % (ref 35–48)
HGB BLD-MCNC: 11.1 G/DL (ref 12–16)
IMM GRANULOCYTES # BLD AUTO: 0.11 X10(3) UL (ref 0–1)
IMM GRANULOCYTES NFR BLD: 0.9 %
LYMPHOCYTES # BLD AUTO: 1.04 X10(3) UL (ref 1–4)
LYMPHOCYTES NFR BLD AUTO: 8.8 %
MCH RBC QN AUTO: 29.3 PG (ref 26–34)
MCHC RBC AUTO-ENTMCNC: 31.8 G/DL (ref 31–37)
MCV RBC AUTO: 92.1 FL (ref 80–100)
MONOCYTES # BLD AUTO: 0.71 X10(3) UL (ref 0.1–1)
MONOCYTES NFR BLD AUTO: 6 %
NEUTROPHILS # BLD AUTO: 9.94 X10 (3) UL (ref 1.5–7.7)
NEUTROPHILS # BLD AUTO: 9.94 X10(3) UL (ref 1.5–7.7)
NEUTROPHILS NFR BLD AUTO: 83.8 %
OSMOLALITY SERPL CALC.SUM OF ELEC: 288 MOSM/KG (ref 275–295)
PLATELET # BLD AUTO: 117 10(3)UL (ref 150–450)
POTASSIUM SERPL-SCNC: 3.3 MMOL/L (ref 3.5–5.1)
POTASSIUM SERPL-SCNC: 4.2 MMOL/L (ref 3.5–5.1)
RBC # BLD AUTO: 3.79 X10(6)UL (ref 3.8–5.3)
SODIUM SERPL-SCNC: 140 MMOL/L (ref 136–145)
WBC # BLD AUTO: 11.9 X10(3) UL (ref 4–11)

## 2019-03-05 PROCEDURE — 99232 SBSQ HOSP IP/OBS MODERATE 35: CPT | Performed by: INTERNAL MEDICINE

## 2019-03-05 RX ORDER — POTASSIUM CHLORIDE 20 MEQ/1
40 TABLET, EXTENDED RELEASE ORAL EVERY 4 HOURS
Status: COMPLETED | OUTPATIENT
Start: 2019-03-05 | End: 2019-03-05

## 2019-03-05 NOTE — PLAN OF CARE
Assumed care @ 0730. Patient's left leg red, hot and tender. Patient afebrile. Left leg kept elevated. Patient's vital signs stable.

## 2019-03-05 NOTE — PROGRESS NOTES
[facility] HOSPITALIST  Progress Note     Sulaiman Sewell Patient Status:  Inpatient    1960 MRN [id number]   [facility] 4NW-A Attending Liyah Galo MD   Hosp Day # 1 PCP March Basil     Chief Complaint: fever, cellulitis    S: Estimated Creatinine Clearance: 122.4 mL/min (A) (based on SCr of 0.36 mg/dL (L)). Recent Labs   Lab  03/04/19   1106   PTP  14.0   INR  1.04       No results for input(s): TROP, CK in the last 168 hours.          Imaging: Imaging data reviewed in

## 2019-03-05 NOTE — PROGRESS NOTES
[address] [facility]  TEL: [phone]  FAX: [phone]    Ethyl Lacks Patient Status:  Inpatient    1960 MRN [id number]   [facility] 4NW-A Attending Keith Hutchison MD   Hosp Day # 1 PCP Everett Smith Microbiology    Reviewed in EMR,   Hospital Encounter on 03/04/19   1.  BLOOD CULTURE     Status: None (Preliminary result)    Collection Time: 03/04/19  9:50 AM   Result Value Ref Range    Blood Culture Result No Growth 1 Day N/A         Radiology: U of midline. Normal heart size and pulmonary vascularity. No pleural effusion or pneumothorax. No lobar consolidation. Degenerative changes in the spine. CONCLUSION:  No active cardiopulmonary process identified.     Dictated by: Kel Pearson MD on

## 2019-03-05 NOTE — PLAN OF CARE
CARDIOVASCULAR - ADULT    • Absence of cardiac arrhythmias or at baseline Progressing        GASTROINTESTINAL - ADULT    • Minimal or absence of nausea and vomiting Progressing        HEMATOLOGIC - ADULT    • Maintains hematologic stability Progressing

## 2019-03-05 NOTE — CM/SW NOTE
03/05/19 1500   CM/SW Referral Data   Referral Source Social Work (self-referral)   Reason for Referral Discharge planning   Informant Patient   Patient Info   Patient's Mental Status Alert;Oriented   Patient's Home Environment Condo/Apt with elevator

## 2019-03-06 PROCEDURE — 99232 SBSQ HOSP IP/OBS MODERATE 35: CPT | Performed by: HOSPITALIST

## 2019-03-06 RX ORDER — AMMONIUM LACTATE 12 G/100G
LOTION TOPICAL DAILY
Status: DISCONTINUED | OUTPATIENT
Start: 2019-03-06 | End: 2019-03-08

## 2019-03-06 NOTE — CM/SW NOTE
VIV called [facility] [phone] who stated the pt was discharged on 2/20. Pt stated she would like to resume services w/them at [place]. VIV sent referral via [address] [place] [address] Drive.

## 2019-03-06 NOTE — PROGRESS NOTES
[address] [facility]  TEL: [phone]  FAX: [phone]    Kamala Maradiaga Patient Status:  Inpatient    1960 MRN [id number]   [facility] 4NW-A Attending Esme Delcid MD   Hosp Day # 2 PCP Abelardo White 18   --    --    --    BILT  0.5   --    --    --    TP  9.0*   --    --    --        Microbiology    Reviewed in EMR,   Hospital Encounter on 03/04/19   1.  URINE CULTURE, ROUTINE     Status: None    Collection Time: 03/04/19 12:13 PM   Result Value Ref Ra

## 2019-03-06 NOTE — CM/SW NOTE
Care Management/BPCI:    Met with patient at bedside to explain the BPCI/Medicare program. Patient agreed with phone f/u for 3 months from [facility] after discharge from [facility]. Patient was enrolled under DRG   871        .  BPCI/Medicare Letter a

## 2019-03-06 NOTE — PLAN OF CARE
Skin integrity remains intact Not Progressing      Minimal or absence of nausea and vomiting Progressing      Absence of seizures Progressing    Febrile, denies pain, bilateral lower extremities are edematous, gross lymphedema to left lower extremity inclu

## 2019-03-06 NOTE — PROGRESS NOTES
[facility]AD HOSPITALIST  Progress Note     Kirstie Angeles Patient Status:  Inpatient    1960 MRN [id number]   [facility] 4NW-A Attending David Lugo MD   Hosp Day # 2 PCP Natasha Hess     Chief Complaint: fever, cellulitis    S: --    --    --    BILT  0.5   --    --    --    TP  9.0*   --    --    --        Estimated Creatinine Clearance: 122.4 mL/min (A) (based on SCr of 0.36 mg/dL (L)).     Recent Labs   Lab  03/04/19   1106   PTP  14.0   INR  1.04       No results for input(s):

## 2019-03-07 VITALS
HEART RATE: 93 BPM | HEIGHT: 60 IN | BODY MASS INDEX: 32.89 KG/M2 | RESPIRATION RATE: 20 BRPM | DIASTOLIC BLOOD PRESSURE: 99 MMHG | TEMPERATURE: 98 F | SYSTOLIC BLOOD PRESSURE: 151 MMHG | WEIGHT: 167.56 LBS | OXYGEN SATURATION: 97 %

## 2019-03-07 LAB
BASOPHILS # BLD AUTO: 0.02 X10(3) UL (ref 0–0.2)
BASOPHILS NFR BLD AUTO: 0.3 %
DEPRECATED RDW RBC AUTO: 40.3 FL (ref 35.1–46.3)
EOSINOPHIL # BLD AUTO: 0.16 X10(3) UL (ref 0–0.7)
EOSINOPHIL NFR BLD AUTO: 2.6 %
ERYTHROCYTE [DISTWIDTH] IN BLOOD BY AUTOMATED COUNT: 12.4 % (ref 11–15)
HCT VFR BLD AUTO: 33.8 % (ref 35–48)
HGB BLD-MCNC: 11.1 G/DL (ref 12–16)
IMM GRANULOCYTES # BLD AUTO: 0.01 X10(3) UL (ref 0–1)
IMM GRANULOCYTES NFR BLD: 0.2 %
LYMPHOCYTES # BLD AUTO: 0.93 X10(3) UL (ref 1–4)
LYMPHOCYTES NFR BLD AUTO: 15 %
MCH RBC QN AUTO: 29.1 PG (ref 26–34)
MCHC RBC AUTO-ENTMCNC: 32.8 G/DL (ref 31–37)
MCV RBC AUTO: 88.5 FL (ref 80–100)
MONOCYTES # BLD AUTO: 0.39 X10(3) UL (ref 0.1–1)
MONOCYTES NFR BLD AUTO: 6.3 %
NEUTROPHILS # BLD AUTO: 4.7 X10 (3) UL (ref 1.5–7.7)
NEUTROPHILS # BLD AUTO: 4.7 X10(3) UL (ref 1.5–7.7)
NEUTROPHILS NFR BLD AUTO: 75.6 %
PLATELET # BLD AUTO: 117 10(3)UL (ref 150–450)
RBC # BLD AUTO: 3.82 X10(6)UL (ref 3.8–5.3)
WBC # BLD AUTO: 6.2 X10(3) UL (ref 4–11)

## 2019-03-07 PROCEDURE — 99239 HOSP IP/OBS DSCHRG MGMT >30: CPT | Performed by: HOSPITALIST

## 2019-03-07 RX ORDER — CEPHALEXIN 500 MG/1
500 CAPSULE ORAL 2 TIMES DAILY
Qty: 180 CAPSULE | Refills: 0 | Status: SHIPPED | OUTPATIENT
Start: 2019-03-22 | End: 2019-06-20

## 2019-03-07 RX ORDER — CEFADROXIL 500 MG/1
500 CAPSULE ORAL 2 TIMES DAILY
Qty: 28 CAPSULE | Refills: 0 | Status: SHIPPED | OUTPATIENT
Start: 2019-03-07 | End: 2019-03-21

## 2019-03-07 NOTE — PROGRESS NOTES
[address] [facility]  TEL: [phone]  FAX: [phone]    Tushar Williamson Patient Status:  Inpatient    1960 MRN [id number]   [facility] 4NW-A Attending Reshma Bermudez MD   Hosp Day # 3 PCP Leoncio Weeks --   113*   --    CO2  22.0   --   21.0   --    ALKPHO  92   --    --    --    AST   --   14*   --    --    ALT  18   --    --    --    BILT  0.5   --    --    --    TP  9.0*   --    --    --        Microbiology    Reviewed in EMR,   Hospital Encounter o

## 2019-03-07 NOTE — PHYSICAL THERAPY NOTE
Pt known to writer from previous hospital admits.  Pt requires a kong lift for all mobility if mobilizing out of w/c.  Pt is w/c bound- uses electric w/c to mobilize.  Pt states does not sleep in a bed, only stays in w/c.  Per previous notes, pt reported

## 2019-03-07 NOTE — CM/SW NOTE
03/07/19 1400   Discharge disposition   Expected discharge disposition Home or Self   Discharge transportation [facility] on chart and [facility] to arrive at [address] aware the pt will dc later today.  Prov

## 2019-03-07 NOTE — PLAN OF CARE
GASTROINTESTINAL - ADULT    • Minimal or absence of nausea and vomiting Not Progressing          NEUROLOGICAL - ADULT    • Absence of seizures Not Progressing    • Remains free of injury related to seizure activity Not Progressing          RESPIRATORY - AD

## 2019-03-07 NOTE — OCCUPATIONAL THERAPY NOTE
OCCUPATIONAL THERAPY QUICK EVALUATION - INPATIENT    Room Number: 433/433-A  Evaluation Date: 3/7/2019     Type of Evaluation: Initial  Presenting Problem: LLE cellulitis    Physician Order: IP Consult to Occupational Therapy  Reason for Therapy:  ADL/IADL trained in donning/doffing compression garments, however has not been wearing d/t limited amount of time. SUBJECTIVE   \"I try to keep my legs elevated and I apply lotion. . I don't know why it keeps getting like this. \" (LLE cellulitis)    Patient s Edema reduction techniques, role of OT, compression wear, donning/doffing techniques, skin care.     General Comments: Patient educated on importance of edema reduction to prevent future cellulitis exacerbations.   Patient also educated on skin care and imp 3 - 5 performance deficits   Client Assessment/Performance Deficits  MODERATE - Comorbidities and min to mod modifications of tasks    Clinical Decision Making  LOW - Analysis of occupational profile, problem-focused assessments, limited treatment options

## 2019-03-08 NOTE — DISCHARGE SUMMARY
[facility] HOSPITAL[facility]  DISCHARGE SUMMARY     [place] Like Patient Status:  Inpatient    1960 MRN [id number]   [facility] 4NW-A Attending No att. providers found   Hosp Day # 3 PCP Maribell Johnson     Date of Admission: 3/4/2019  Date Caps  Commonly known as:  Tyrese De La Vega  Start taking on:  3/22/2019      Take 1 capsule (500 mg total) by mouth 2 (two) times daily.  Start taking AFTER completing prescription for Cefadroxil for prophylaxis of cellulitis   Stop taking on:  6/20/2019  Quantity: 30 minutes

## 2019-03-08 NOTE — PROGRESS NOTES
NURSING DISCHARGE NOTE    Discharged Home via Ambulance. Accompanied by   Belongings Taken by patient/family. Pt aaox3-4, denies pain, pt aware of dc home.

## 2019-04-09 ENCOUNTER — HOSPITAL ENCOUNTER (OUTPATIENT)
Facility: HOSPITAL | Age: 59
Setting detail: OBSERVATION
Discharge: HOME | End: 2019-04-10
Attending: EMERGENCY MEDICINE | Admitting: INTERNAL MEDICINE
Payer: MEDICARE

## 2019-04-09 DIAGNOSIS — R74.8 ELEVATED LIPASE: ICD-10-CM

## 2019-04-09 DIAGNOSIS — G80.9 CEREBRAL PALSY, UNSPECIFIED TYPE (HCC): ICD-10-CM

## 2019-04-09 DIAGNOSIS — R11.2 NAUSEA AND VOMITING, INTRACTABILITY OF VOMITING NOT SPECIFIED, UNSPECIFIED VOMITING TYPE: Primary | ICD-10-CM

## 2019-04-09 DIAGNOSIS — G82.20 PARAPLEGIA (HCC): ICD-10-CM

## 2019-04-09 PROCEDURE — 99219 INITIAL OBSERVATION CARE,LEVL II: CPT | Performed by: INTERNAL MEDICINE

## 2019-04-09 RX ORDER — HEPARIN SODIUM 5000 [USP'U]/ML
5000 INJECTION, SOLUTION INTRAVENOUS; SUBCUTANEOUS EVERY 8 HOURS SCHEDULED
Status: DISCONTINUED | OUTPATIENT
Start: 2019-04-09 | End: 2019-04-10

## 2019-04-09 RX ORDER — ACETAMINOPHEN 325 MG/1
650 TABLET ORAL EVERY 6 HOURS PRN
Status: DISCONTINUED | OUTPATIENT
Start: 2019-04-09 | End: 2019-04-10

## 2019-04-09 RX ORDER — PROMETHAZINE HYDROCHLORIDE 25 MG/1
25 SUPPOSITORY RECTAL EVERY 6 HOURS PRN
Status: DISCONTINUED | OUTPATIENT
Start: 2019-04-09 | End: 2019-04-10

## 2019-04-09 RX ORDER — ONDANSETRON 2 MG/ML
4 INJECTION INTRAMUSCULAR; INTRAVENOUS EVERY 6 HOURS PRN
Status: DISCONTINUED | OUTPATIENT
Start: 2019-04-09 | End: 2019-04-10

## 2019-04-09 RX ORDER — ONDANSETRON 2 MG/ML
4 INJECTION INTRAMUSCULAR; INTRAVENOUS ONCE
Status: COMPLETED | OUTPATIENT
Start: 2019-04-09 | End: 2019-04-09

## 2019-04-09 RX ORDER — SODIUM CHLORIDE 9 MG/ML
INJECTION, SOLUTION INTRAVENOUS CONTINUOUS
Status: DISCONTINUED | OUTPATIENT
Start: 2019-04-09 | End: 2019-04-10

## 2019-04-09 RX ORDER — PHENYTOIN SODIUM 100 MG/1
300 CAPSULE, EXTENDED RELEASE ORAL DAILY
Status: DISCONTINUED | OUTPATIENT
Start: 2019-04-09 | End: 2019-04-10

## 2019-04-09 RX ORDER — PROMETHAZINE HYDROCHLORIDE 12.5 MG/1
12.5 SUPPOSITORY RECTAL EVERY 6 HOURS PRN
Status: DISCONTINUED | OUTPATIENT
Start: 2019-04-09 | End: 2019-04-10

## 2019-04-09 RX ORDER — METOCLOPRAMIDE HYDROCHLORIDE 5 MG/ML
10 INJECTION INTRAMUSCULAR; INTRAVENOUS EVERY 8 HOURS PRN
Status: DISCONTINUED | OUTPATIENT
Start: 2019-04-09 | End: 2019-04-10

## 2019-04-09 RX ORDER — ONDANSETRON 2 MG/ML
INJECTION INTRAMUSCULAR; INTRAVENOUS
Status: DISPENSED
Start: 2019-04-09 | End: 2019-04-10

## 2019-04-09 RX ORDER — ASCORBIC ACID 500 MG
500 TABLET ORAL DAILY
Status: DISCONTINUED | OUTPATIENT
Start: 2019-04-09 | End: 2019-04-10

## 2019-04-09 RX ORDER — ACETAMINOPHEN 160 MG
2000 TABLET,DISINTEGRATING ORAL DAILY
Status: DISCONTINUED | OUTPATIENT
Start: 2019-04-09 | End: 2019-04-10

## 2019-04-09 RX ORDER — CEPHALEXIN 500 MG/1
500 CAPSULE ORAL 2 TIMES DAILY
Status: DISCONTINUED | OUTPATIENT
Start: 2019-04-09 | End: 2019-04-10

## 2019-04-09 NOTE — ED PROVIDER NOTES
Patient Seen in: [facility] Emergency Department    History   Patient presents with:  Nausea/Vomiting/Diarrhea (gastrointestinal)    Stated Complaint: vomiting/diarrhea    HPI    31-year-old woman with a history of cerebral palsy lying on an emergen noted.  She has a heart with a regular rate and rhythm lungs are clear to auscultation    ED Course     Labs Reviewed   COMP METABOLIC PANEL (14) - Abnormal; Notable for the following components:       Result Value    Glucose 100 (*)     BUN/CREA Ratio 26. patient lives in a facility that does not have around the clock care or help.     MDM     Admission disposition: 4/9/2019  8:17 PM                 Disposition and Plan     Clinical Impression:  Nausea and vomiting, intractability of vomiting not specified,

## 2019-04-10 VITALS
TEMPERATURE: 98 F | BODY MASS INDEX: 32.89 KG/M2 | WEIGHT: 167.56 LBS | RESPIRATION RATE: 18 BRPM | HEIGHT: 60 IN | HEART RATE: 89 BPM | SYSTOLIC BLOOD PRESSURE: 108 MMHG | DIASTOLIC BLOOD PRESSURE: 57 MMHG | OXYGEN SATURATION: 92 %

## 2019-04-10 PROCEDURE — 99217 OBSERVATION CARE DISCHARGE: CPT | Performed by: HOSPITALIST

## 2019-04-10 NOTE — H&P
[facility] HOSPITALIST  History and Physical     Marshal Rizzo Patient Status:  Emergency    1960 MRN [id number]   Location 6[facility] Attending Jacque Chambers MD   Hosp Day # 0 PCP Abbey Demarco     Chief Complaint: of systems was completed. Pertinent positives and negatives noted in the HPI.     Physical Exam:    BP (!) 149/103   Pulse 99   Temp 98.5 °F (36.9 °C) (Temporal)   Resp 18   Ht 152.4 cm (5')   Wt 167 lb 8.8 oz (76 kg)   SpO2 93%   BMI 32.72 kg/m²   Gener MD  4/9/2019

## 2019-04-10 NOTE — CM/SW NOTE
04/10/19 1300   Discharge disposition   Expected discharge disposition Home or Self   Discharge transportation [facility] form placed on the pt's chart. Informed [facility] the pt will dc today.

## 2019-04-10 NOTE — PROGRESS NOTES
NURSING ADMISSION NOTE      Patient admitted via Cart  Oriented to room. Safety precautions initiated. Bed in low position. Call light in reach. Pt arrived to room 408, very drowsy and lethargic.  Pt responding to verbal commands, but falls asleep

## 2019-04-10 NOTE — CM/SW NOTE
04/10/19 0900   CM/SW Referral Data   Referral Source Social Work (self-referral)   Reason for Referral Discharge planning   Informant Patient   Patient Info   Patient's Mental Status Alert;Oriented   Patient's Home Environment Condo/Apt with elevator

## 2019-04-10 NOTE — PLAN OF CARE
Patient received this am alert and oriented, bedrest. Lungs clear on room air, abdomen soft, bowel sounds present, passing flatus, incontinent of stool. Incontinent of urine. Diet advanced and tolerating, denies nausea, pain and SOB.

## 2019-04-10 NOTE — PROGRESS NOTES
[facility] HOSPITALIST  Progress Note     Leo Asifyon Patient Status:  Observation    1960 MRN [id number]   [facility] 4NW-A Attending Ann Guerra MD   Hosp Day # 0 PCP Jackson Mt     Chief Complaint: N/V/D    S: Patient fee mEq Intravenous Once   • cephALEXin  500 mg Oral BID   • Vitamin D3  2,000 Units Oral Daily   • Phenytoin Sodium Extended  300 mg Oral Daily   • Vitamin C  500 mg Oral Daily   • Heparin Sodium (Porcine)  5,000 Units Subcutaneous Q8H [facility]       ASSESSMENT /

## 2019-04-13 ENCOUNTER — HOSPITAL ENCOUNTER (INPATIENT)
Facility: HOSPITAL | Age: 59
LOS: 5 days | Discharge: HOME | DRG: 602 | End: 2019-04-18
Attending: EMERGENCY MEDICINE | Admitting: HOSPITALIST
Payer: MEDICARE

## 2019-04-13 ENCOUNTER — APPOINTMENT (OUTPATIENT)
Dept: ULTRASOUND IMAGING | Facility: HOSPITAL | Age: 59
DRG: 602 | End: 2019-04-13
Attending: EMERGENCY MEDICINE
Payer: MEDICARE

## 2019-04-13 ENCOUNTER — APPOINTMENT (OUTPATIENT)
Dept: GENERAL RADIOLOGY | Facility: HOSPITAL | Age: 59
DRG: 602 | End: 2019-04-13
Attending: EMERGENCY MEDICINE
Payer: MEDICARE

## 2019-04-13 DIAGNOSIS — L03.116 CELLULITIS OF LEFT LOWER EXTREMITY: Primary | ICD-10-CM

## 2019-04-13 DIAGNOSIS — I82.4Y1 DEEP VEIN THROMBOSIS (DVT) OF PROXIMAL VEIN OF RIGHT LOWER EXTREMITY, UNSPECIFIED CHRONICITY (HCC): ICD-10-CM

## 2019-04-13 DIAGNOSIS — I82.412 ACUTE DEEP VEIN THROMBOSIS (DVT) OF FEMORAL VEIN OF LEFT LOWER EXTREMITY (HCC): ICD-10-CM

## 2019-04-13 DIAGNOSIS — E87.6 HYPOKALEMIA: ICD-10-CM

## 2019-04-13 PROCEDURE — 71045 X-RAY EXAM CHEST 1 VIEW: CPT | Performed by: EMERGENCY MEDICINE

## 2019-04-13 PROCEDURE — 99223 1ST HOSP IP/OBS HIGH 75: CPT | Performed by: HOSPITALIST

## 2019-04-13 PROCEDURE — 93971 EXTREMITY STUDY: CPT | Performed by: EMERGENCY MEDICINE

## 2019-04-13 RX ORDER — METOCLOPRAMIDE HYDROCHLORIDE 5 MG/ML
10 INJECTION INTRAMUSCULAR; INTRAVENOUS EVERY 8 HOURS PRN
Status: DISCONTINUED | OUTPATIENT
Start: 2019-04-13 | End: 2019-04-18

## 2019-04-13 RX ORDER — ONDANSETRON 2 MG/ML
4 INJECTION INTRAMUSCULAR; INTRAVENOUS EVERY 6 HOURS PRN
Status: DISCONTINUED | OUTPATIENT
Start: 2019-04-13 | End: 2019-04-18

## 2019-04-13 RX ORDER — ACETAMINOPHEN 325 MG/1
650 TABLET ORAL EVERY 6 HOURS PRN
Status: DISCONTINUED | OUTPATIENT
Start: 2019-04-13 | End: 2019-04-18

## 2019-04-13 RX ORDER — IBUPROFEN 600 MG/1
600 TABLET ORAL ONCE
Status: COMPLETED | OUTPATIENT
Start: 2019-04-13 | End: 2019-04-13

## 2019-04-13 RX ORDER — PHENYTOIN SODIUM 100 MG/1
300 CAPSULE, EXTENDED RELEASE ORAL DAILY
Status: DISCONTINUED | OUTPATIENT
Start: 2019-04-14 | End: 2019-04-18

## 2019-04-13 RX ORDER — POTASSIUM CHLORIDE 20 MEQ/1
40 TABLET, EXTENDED RELEASE ORAL ONCE
Status: COMPLETED | OUTPATIENT
Start: 2019-04-13 | End: 2019-04-13

## 2019-04-13 RX ORDER — CEFAZOLIN SODIUM/WATER 2 G/20 ML
2 SYRINGE (ML) INTRAVENOUS EVERY 8 HOURS
Status: DISCONTINUED | OUTPATIENT
Start: 2019-04-14 | End: 2019-04-18

## 2019-04-13 NOTE — ED NOTES
Pt straight catheterized using quick cath with return of clear rob urine. Pt positioned on cart for comfort. resps still apper slightly labored and shallow. Pt on 2l/nc.

## 2019-04-13 NOTE — ED INITIAL ASSESSMENT (HPI)
Pt presents to the ED via EMS from home with complaints of fever. Pt with hx of CP, lives at home with family and caregiver. Pt awake and alert, skin hot and dry, resps reg/shallow.

## 2019-04-14 PROCEDURE — 99232 SBSQ HOSP IP/OBS MODERATE 35: CPT | Performed by: INTERNAL MEDICINE

## 2019-04-14 NOTE — H&P
[facility] HOSPITALIST  History and Physical     Bruce Olszewski Patient Status:  Emergency    1960 MRN [id number]   Location [facility] Attending Gideon Wall MD   Hosp Day # 0 PCP Aileen Simms     Chief Complaint: LLE e Take 2,000 Units by mouth daily. Disp:  Rfl:    Phenytoin Sodium Extended 100 MG Oral Cap Take 300 mg by mouth daily. Disp:  Rfl:        Review of Systems:   A comprehensive 14 point review of systems was completed.     Pertinent positives and negatives not 1.10       No results for input(s): TROP, CK in the last 168 hours. Imaging: Imaging data reviewed in Epic. ASSESSMENT / PLAN:     1. Cellulitis-we will continue on IV Ancef.   Will have nursing marked borders of the cellulitis to marked progression

## 2019-04-14 NOTE — PLAN OF CARE
NURSING ADMISSION NOTE      Patient admitted via Cart  Oriented to room. Safety precautions initiated. Bed in low position. Call light in reach. Patient admitted from ER with cellulitis. A+Ox4. Able to make needs known and use call light.  Denies

## 2019-04-14 NOTE — PROGRESS NOTES
Afebrile. Denies pain unless left leg has to be manipulated. Area of redness to left leg marked. Significant lymphedema to left leg. Had small emesis. No complants. Call light within reach.

## 2019-04-14 NOTE — PROGRESS NOTES
[facility]  Progress Note    Charly Olszewski Patient Status:  Inpatient    1960 MRN [id number]   Location [address] [place] [address]-A Attending Ad Humphries MD   Hosp Day # 1 PCP Aileen Simms     CC: Lower extremity redness and swelling    SUBJ 04/14/2019    CO2 22.0 04/14/2019    GLU 96 04/14/2019    CA 8.5 04/14/2019    ALB 3.1 04/13/2019    ALKPHO 56 04/13/2019    BILT 0.6 04/13/2019    TP 7.2 04/13/2019    AST 17 04/13/2019    ALT 20 04/13/2019    PTT 31.6 04/13/2019    INR 1.10 04/13/2019 g 2 g Intravenous Q8H   Phenytoin Sodium Extended (DILANTIN) ER cap 300 mg 300 mg Oral Daily       ASSESSMENT / PLAN:     1. Left lower extremity cellulitis–continue IV antibiotics   2. right lower extremity acute DVT-patient on Xarelto  3.  Seizure disorde

## 2019-04-14 NOTE — PLAN OF CARE
Patient received this am alert and oriented x 4. Lungs clear diminished, productive cough for whitish phlegm, room air. Abdomen soft, bowel sounds present, passing flatus, incontinent of bowel and bladder. Bilateral saline locks flushed and patent.

## 2019-04-14 NOTE — ED PROVIDER NOTES
Patient Seen in: [facility] Emergency Department    History   Patient presents with:  Fever (infectious)    Stated Complaint: fever    HPI    Patient is a 55-year-old female who has a history of cerebral palsy.   Patient has chronic lymphedema to her tenderness. ABDOMEN: + Bowel sounds, soft, nontender, nondistended. No rebound, no guarding, no hepatosplenomegaly. EXTREMITIES: Full range of motion, no tenderness, good capillary refill. SKIN: No rash, good turgor.   NEURO: Patient answers questions a individual orders. RAINBOW DRAW BLUE   RAINBOW DRAW LAVENDER   RAINBOW DRAW LIGHT GREEN   RAINBOW DRAW GOLD   BLOOD CULTURE   BLOOD CULTURE   URINE CULTURE, ROUTINE     EKG    Rate, intervals and axes as noted on EKG Report.   Rate: 103  Rhythm: Sinus Rhy

## 2019-04-15 PROCEDURE — 99232 SBSQ HOSP IP/OBS MODERATE 35: CPT | Performed by: INTERNAL MEDICINE

## 2019-04-15 PROCEDURE — 99223 1ST HOSP IP/OBS HIGH 75: CPT | Performed by: INTERNAL MEDICINE

## 2019-04-15 RX ORDER — WARFARIN SODIUM 5 MG/1
5 TABLET ORAL NIGHTLY
Status: DISCONTINUED | OUTPATIENT
Start: 2019-04-15 | End: 2019-04-17

## 2019-04-15 RX ORDER — ENOXAPARIN SODIUM 100 MG/ML
100 INJECTION SUBCUTANEOUS DAILY
Status: DISCONTINUED | OUTPATIENT
Start: 2019-04-15 | End: 2019-04-18

## 2019-04-15 NOTE — PROGRESS NOTES
[facility]  Progress Note    Marshell Severs Patient Status:  Inpatient    1960 MRN [id number]   [facility] 4NW-A Attending Mayda Mendoza MD   Hosp Day # 2 PCP Lisa Waite     CC: Lower extremity redness and swelling    SUBJ PRN   ceFAZolin sodium (ANCEF/KEFZOL) 2 GM/20ML premix IV syringe 2 g 2 g Intravenous Q8H   Phenytoin Sodium Extended (DILANTIN) ER cap 300 mg 300 mg Oral Daily       ASSESSMENT / PLAN:     1. Left lower extremity cellulitis–continue IV antibiotics   2.  ri normal,no cyanosis or edema  Pulses: 2+ and symmetric  Skin: Chronic lymphedema of bilateral lower extremities with market swelling of the left lower extremity with redness and warmth on palpation, slightly improved from yesterday with redness currently up

## 2019-04-15 NOTE — DIETARY NOTE
[facility]     Admitting diagnosis:  Hypokalemia [E87.6]  Cellulitis of left lower extremity [B57.011]  Deep vein thrombosis (DVT) of proximal vein of right lower extremity, unspecified chronicity ([facility] [I82.4Y1

## 2019-04-15 NOTE — CM/SW NOTE
04/15/19 0900   CM/SW Referral Data   Referral Source Social Work (self-referral)   Reason for Referral Discharge [facility] Staff; Other  (chart review)   Patient Info   Patient's Mental Status Alert;Oriented   Patient Communication Iss

## 2019-04-15 NOTE — PLAN OF CARE
Pt AOx3. Repositioned in bed. Pt bedbound. Elevating BLE. Cellulitis of LLE showing improvement, continuing on IV abx. Plan to consult hematology for anticoagulation since many PO anticoagulants interact with pt's seizure medication.  Pt states pain is impr

## 2019-04-15 NOTE — CONSULTS
[facility]  Report of Consultation    Tushar Williamson Patient Status:  Inpatient    1960 MRN [id number]   [facility] 4NW-A Attending Fannie Bolden MD   [facility] Day # 2 PCP [place] Payment     Reason for Consultation:    Evaluation History   Family history unknown: Yes       Social History:  she reports that she has never smoked. She has never used smokeless tobacco. She reports that she does not drink alcohol or use drugs.     Allergies:    Sulfa Antibiotics           Medications: 04/09/19  1553 04/10/19  0549 04/13/19  1736 04/14/19  0840   * 86 104* 96   BUN 15 15 14 17   CREATSERUM 0.56 0.38* 0.68 0.52*   GFRAA 119 135 112 122   GFRNAA 103 117 97 106   CA 9.5 8.3* 8.5 8.5   ALB 3.7  --  3.1*  --     143 140 141   K 4 proximal vein of right lower extremity, unspecified chronicity (HCC)     Hypokalemia      Left femoral vein DVT:  Diagnosed on 4/13/2019  New compared to venous doppler on 3/4/2019    The patient is currently on rivaroxaban 15 mg BID.  There is a major inte

## 2019-04-15 NOTE — PLAN OF CARE
Problem: PAIN - ADULT  Goal: Verbalizes/displays adequate comfort level or patient's stated pain goal  Description  INTERVENTIONS:  - Encourage pt to monitor pain and request assistance  - Assess pain using appropriate pain scale  - Administer analgesics

## 2019-04-15 NOTE — PROGRESS NOTES
04/15/19 1743   Clinical Encounter Type   Visited With Patient   Continue Visiting No  ( remains available at pager #[phone] if needed/requested.)   Faith Encounters   Spiritual Requests During Visit / Hospitalization Declines  Visit

## 2019-04-16 PROCEDURE — 99232 SBSQ HOSP IP/OBS MODERATE 35: CPT | Performed by: INTERNAL MEDICINE

## 2019-04-16 NOTE — PROGRESS NOTES
Heme/Onc Progress Note - [facility]      Chief Complaint:    Follow up for left femoral vein DVT. Interim History:      She is now on Lovenox 100 mg daily. She started warfarin last night. The INR baseline was 1.1. The INR today is 1.3.  She has no bleeding Repeat PT/INR in the am.    Thrombocytopenia:    She has chronic thrombocytopenia that is a little worse. Not consistent with heparin reaction.  Repeat platelet count in am. CPM.    Adriel Larose MD  [facility]

## 2019-04-16 NOTE — PLAN OF CARE
Bedresting, denies discomfort , chronic lymphedema BLE, left distal LE w/ redness , warmer than  Right foot , , nontender, incontinent of urine though know when she is soiled and requests brief care, appetite small, feeds self w/ good set up, afebrile , IV

## 2019-04-16 NOTE — PROGRESS NOTES
[facility]  Progress Note    Catrachita Record Patient Status:  Inpatient    1960 MRN [id number]   [facility] 4NW-A Attending Maynor Monge MD   Hosp Day # 3 PCP Gerardo Ahr     CC: Lower extremity redness and swelling    SUBJ Q6H PRN   ondansetron HCl (ZOFRAN) injection 4 mg 4 mg Intravenous Q6H PRN   Metoclopramide HCl (REGLAN) injection 10 mg 10 mg Intravenous Q8H PRN   ceFAZolin sodium (ANCEF/KEFZOL) 2 GM/20ML premix IV syringe 2 g 2 g Intravenous Q8H   Phenytoin Sodium Exte clear to auscultation bilaterally  Heart: S1, S2 normal, no murmur,  regular rate and rhythm  Abdomen: soft, non-tender; bowel sounds normal  Extremities: extremities normal,no cyanosis or edema  Pulses: 2+ and symmetric  Skin: Chronic lymphedema of bilate

## 2019-04-16 NOTE — CM/SW NOTE
VIV spoke w/DERIK Leija who stated the pt is going to need Lovenox shots at NH. VIV called [facility] and spoke w/Neela who stated they would be able to manage the Lovenox shots. Informed them the pt will need them daily.  Barbara Barrera stated they needed an order

## 2019-04-16 NOTE — PHYSICAL THERAPY NOTE
Received order for inpt PT which states: \"hx CP, 15yrs ago walking with walker, now bedbound, home with caregiver, please call sister for evaluation\". Chart reviewed.  Per pt and chart, pt is dependent on kong lift for all transfers, and dependent on sister has any questions, then she can ask the nurse or the MD regarding the report. The pt verbally indicates understanding of this and is agreement. A total of 20 minutes was spent in discussion with pt and attempting to contact the sister.      At th

## 2019-04-16 NOTE — PLAN OF CARE
Problem: PAIN - ADULT  Goal: Verbalizes/displays adequate comfort level or patient's stated pain goal  Description  INTERVENTIONS:  - Encourage pt to monitor pain and request assistance  - Assess pain using appropriate pain scale  - Administer analgesics Encourage toileting schedule  Outcome: Progressing     Problem: DISCHARGE PLANNING  Goal: Discharge to home or other facility with appropriate resources  Description  INTERVENTIONS:  - Identify barriers to discharge w/pt and caregiver  - Include patient/fa

## 2019-04-17 PROCEDURE — 99232 SBSQ HOSP IP/OBS MODERATE 35: CPT | Performed by: HOSPITALIST

## 2019-04-17 PROCEDURE — 99231 SBSQ HOSP IP/OBS SF/LOW 25: CPT | Performed by: NURSE PRACTITIONER

## 2019-04-17 RX ORDER — WARFARIN SODIUM 2.5 MG/1
2.5 TABLET ORAL NIGHTLY
Status: DISCONTINUED | OUTPATIENT
Start: 2019-04-17 | End: 2019-04-18

## 2019-04-17 RX ORDER — GARLIC EXTRACT 500 MG
1 CAPSULE ORAL DAILY
Status: DISCONTINUED | OUTPATIENT
Start: 2019-04-17 | End: 2019-04-18

## 2019-04-17 NOTE — PROGRESS NOTES
[facility]  Progress Note    Cal Narendra Patient Status:  Inpatient    1960 MRN [id number]   [facility] 4NW-A Attending Melanie Moses MD   Hosp Day # 4 PCP Lavonne Lomas     CC: Lower extremity redness and swelling    SUBJ PRN   ondansetron HCl (ZOFRAN) injection 4 mg 4 mg Intravenous Q6H PRN   Metoclopramide HCl (REGLAN) injection 10 mg 10 mg Intravenous Q8H PRN   ceFAZolin sodium (ANCEF/KEFZOL) 2 GM/20ML premix IV syringe 2 g 2 g Intravenous Q8H   Phenytoin Sodium Extended

## 2019-04-17 NOTE — PROGRESS NOTES
Heme/Onc Progress Note  Attending - [facility]      Chief Complaint:    Follow up for left femoral vein DVT. Interim History:      She is now on Lovenox 100 mg daily. She started warfarin 5mg 4/15 and 4/16. INR this AM 2.32. She has no bleeding complaints. Gloria Stevenson NP-C  Nurse Practitioner  [address] [facility]

## 2019-04-17 NOTE — CM/SW NOTE
VIV followed up w/pt. RN stating the pt has been sleeping in her wheelchair. Pt has medicaid. Asked pt is she felt she needed to go to a nursing facility. Pt has been readmitted multiple times over the past month.  Pt stated she would like about it but is co

## 2019-04-17 NOTE — CM/SW NOTE
Rn stating she spoke w/the pt's sister and the sister does not want the pt to go to a rehab center. She stated the pt will lose her caregiver. Left a message for the pt's sister. Awaiting a call back.

## 2019-04-18 ENCOUNTER — MOBILE ENCOUNTER (OUTPATIENT)
Dept: HEMATOLOGY/ONCOLOGY | Facility: HOSPITAL | Age: 59
End: 2019-04-18

## 2019-04-18 ENCOUNTER — APPOINTMENT (OUTPATIENT)
Dept: GENERAL RADIOLOGY | Facility: HOSPITAL | Age: 59
DRG: 602 | End: 2019-04-18
Attending: HOSPITALIST
Payer: MEDICARE

## 2019-04-18 VITALS
BODY MASS INDEX: 27.11 KG/M2 | HEART RATE: 93 BPM | DIASTOLIC BLOOD PRESSURE: 96 MMHG | TEMPERATURE: 98 F | OXYGEN SATURATION: 93 % | WEIGHT: 153 LBS | SYSTOLIC BLOOD PRESSURE: 138 MMHG | HEIGHT: 63 IN | RESPIRATION RATE: 18 BRPM

## 2019-04-18 PROBLEM — I82.412 ACUTE DEEP VEIN THROMBOSIS (DVT) OF FEMORAL VEIN OF LEFT LOWER EXTREMITY (HCC): Status: ACTIVE | Noted: 2019-04-18

## 2019-04-18 PROCEDURE — 71045 X-RAY EXAM CHEST 1 VIEW: CPT | Performed by: HOSPITALIST

## 2019-04-18 PROCEDURE — 99232 SBSQ HOSP IP/OBS MODERATE 35: CPT | Performed by: NURSE PRACTITIONER

## 2019-04-18 PROCEDURE — 99239 HOSP IP/OBS DSCHRG MGMT >30: CPT | Performed by: HOSPITALIST

## 2019-04-18 RX ORDER — IPRATROPIUM BROMIDE AND ALBUTEROL SULFATE 2.5; .5 MG/3ML; MG/3ML
3 SOLUTION RESPIRATORY (INHALATION) EVERY 4 HOURS PRN
Status: DISCONTINUED | OUTPATIENT
Start: 2019-04-18 | End: 2019-04-18

## 2019-04-18 RX ORDER — WARFARIN SODIUM 2.5 MG/1
2.5 TABLET ORAL NIGHTLY
Qty: 60 TABLET | Refills: 1 | Status: SHIPPED | OUTPATIENT
Start: 2019-04-18 | End: 2019-04-18

## 2019-04-18 RX ORDER — GARLIC EXTRACT 500 MG
1 CAPSULE ORAL DAILY
Qty: 30 CAPSULE | Refills: 0 | Status: SHIPPED | OUTPATIENT
Start: 2019-04-19

## 2019-04-18 RX ORDER — CEFADROXIL 500 MG/1
500 CAPSULE ORAL 2 TIMES DAILY
Qty: 14 CAPSULE | Refills: 0 | Status: SHIPPED | OUTPATIENT
Start: 2019-04-18 | End: 2019-04-25

## 2019-04-18 RX ORDER — WARFARIN SODIUM 2.5 MG/1
2.5 TABLET ORAL NIGHTLY
Qty: 60 TABLET | Refills: 1 | Status: SHIPPED | OUTPATIENT
Start: 2019-04-18 | End: 2019-07-30 | Stop reason: CLARIF

## 2019-04-18 RX ORDER — ENOXAPARIN SODIUM 100 MG/ML
100 INJECTION SUBCUTANEOUS ONCE
Qty: 1 SYRINGE | Refills: 0 | Status: SHIPPED | OUTPATIENT
Start: 2019-04-19 | End: 2019-04-19

## 2019-04-18 RX ORDER — ENOXAPARIN SODIUM 100 MG/ML
100 INJECTION SUBCUTANEOUS ONCE
Qty: 1 SYRINGE | Refills: 0 | Status: SHIPPED | OUTPATIENT
Start: 2019-04-19 | End: 2019-04-18

## 2019-04-18 RX ORDER — ENOXAPARIN SODIUM 100 MG/ML
100 INJECTION SUBCUTANEOUS ONCE
Qty: 1 SYRINGE | Refills: 0 | Status: SHIPPED | COMMUNITY
Start: 2019-04-19 | End: 2019-04-18

## 2019-04-18 NOTE — DISCHARGE SUMMARY
[facility] HOSP[facility]  DISCHARGE SUMMARY     Hunter Aguirre Patient Status:  Inpatient    1960 MRN [id number]   [facility] 4NW-A Attending Merrick Casanova MD   [facility] Day # 5 PCP Whitley Agustin     Date of Admission: 2019  Date of Daphne Alonso of breath, chills, nausea, vomiting, diarrhea, constipation. Brief Synopsis: found to have recurrent LLE cellulitis. Had been on prophylactic abx. Fortunately improved w/ ancef. Will cont w/ cefadroxil and resume keflex for ppx upon completion.  Also had capsule  Refills:  0     Phenytoin Sodium Extended 100 MG Caps  Commonly known as:  DILANTIN      Take 300 mg by mouth daily. Refills:  0     Vitamin C 500 MG Tabs  Commonly known as:  VITAMIN C      Take 500 mg by mouth daily.    Refills:  0     Vitamin

## 2019-04-18 NOTE — PROGRESS NOTES
explained all discharge instructions to patient explained all new medications and also medications that she has been taking. Also explained blood draws and coumadin and that  [facility] would be ordering coumadin and reviewing labs. patient verbalized her under

## 2019-04-18 NOTE — CM/SW NOTE
SW spoke w/Rn who stated the pt will need 1 more Lovenox shot. Rima from [address] stated the can give the shoT tomorrow. Pt's caregiver Patsy [phone] stated she could  the medications from [facility] today.  She stated she will be at the pt's [facility]

## 2019-04-18 NOTE — CM/SW NOTE
04/18/19 1400   Discharge disposition   Expected discharge disposition Home or Self   Discharge transportation [facility]   Ambulance to arrive at 4:30. PCS placed on the chart. [facility]cor will see that pt tomorrow to give Lovenox and draw INR.  Kelli Monge

## 2019-04-18 NOTE — PROGRESS NOTES
Heme/Onc Progress Note  Attending - [facility]      Chief Complaint:    Follow up for left femoral vein DVT. Interim History:     INR this AM 2.03. She has no bleeding complaints. She has no other new complaints. Wants to be discharged home vs SNF.      Phys warfarin 2.5mg tonight. Goal INR 2-3. Repeat PT/INR in the am.    Thrombocytopenia:    She has chronic thrombocytopenia. Plts stable. Discussed with Dr. Julissa Cheema. Okay to be discharged today with repeat INR tomorrow am with home health.  Will send warfarin

## 2019-04-18 NOTE — CM/SW NOTE
SW followed up w/pt regarding dc plan. Pt stating, \"I don't want to go to [facility]. I want to go home. \" Still awaiting confirmation from the pt's sister in regards to the dc plan.

## 2019-04-18 NOTE — PLAN OF CARE
Patient had low grade temperature 99.9F. Having dry cough, wheezing and crackles. Dr. Meseret Paniagua notified, gave new orders. Stat Chest X-ray and PRN breathing treatments ordered. Will continue to monitor.

## 2019-04-18 NOTE — PLAN OF CARE
Patient alert, noted with speech difficulty, but still can make her needs known. Left leg reddened and swollen due to Cellulitis, IV Ancef scheduled. Patient has Hx of Chronic BLE Lymphedema, elevated on pillows. Remains afebrile. Vital signs stable.  No co deficits and behaviors that affect risk of falls.   - [place] fall precautions as indicated by assessment.  - Educate pt/family on patient safety including physical limitations  - Instruct pt to call for assistance with activity based on assessment  - Mod

## 2019-04-19 ENCOUNTER — ANTI-COAG VISIT (OUTPATIENT)
Dept: HEMATOLOGY/ONCOLOGY | Facility: HOSPITAL | Age: 59
End: 2019-04-19

## 2019-04-19 DIAGNOSIS — I82.412 ACUTE DEEP VEIN THROMBOSIS (DVT) OF FEMORAL VEIN OF LEFT LOWER EXTREMITY (HCC): ICD-10-CM

## 2019-04-19 DIAGNOSIS — I82.4Y1 DEEP VEIN THROMBOSIS (DVT) OF PROXIMAL VEIN OF RIGHT LOWER EXTREMITY, UNSPECIFIED CHRONICITY (HCC): ICD-10-CM

## 2019-04-19 RX ORDER — ENOXAPARIN SODIUM 100 MG/ML
100 INJECTION SUBCUTANEOUS ONCE
Qty: 3 SYRINGE | Refills: 0 | Status: SHIPPED | OUTPATIENT
Start: 2019-04-19 | End: 2019-04-22

## 2019-04-19 NOTE — DISCHARGE SUMMARY
[facility] HOSPITALIST  DISCHARGE SUMMARY     Cal Mackey Patient Status:  Observation    1960 MRN [id number]   [facility] 4NW-A Attending No att. providers found   Hosp Day # 0 BULL Lomas     Date of Admission: 2019  Mark mg by mouth daily. Refills:  0     Vitamin C 500 MG Tabs  Commonly known as:  VITAMIN C      Take 500 mg by mouth daily. Refills:  0     Vitamin D 2000 units Caps      Take 2,000 Units by mouth daily.    Refills:  0            [place]    Foll

## 2019-04-22 ENCOUNTER — ANTI-COAG VISIT (OUTPATIENT)
Dept: HEMATOLOGY/ONCOLOGY | Facility: HOSPITAL | Age: 59
End: 2019-04-22

## 2019-04-22 DIAGNOSIS — I82.412 ACUTE DEEP VEIN THROMBOSIS (DVT) OF FEMORAL VEIN OF LEFT LOWER EXTREMITY (HCC): ICD-10-CM

## 2019-04-22 DIAGNOSIS — I82.4Y1 DEEP VEIN THROMBOSIS (DVT) OF PROXIMAL VEIN OF RIGHT LOWER EXTREMITY, UNSPECIFIED CHRONICITY (HCC): ICD-10-CM

## 2019-04-22 RX ORDER — ENOXAPARIN SODIUM 100 MG/ML
100 INJECTION SUBCUTANEOUS ONCE
Qty: 5 SYRINGE | Refills: 0 | Status: SHIPPED | OUTPATIENT
Start: 2019-04-22 | End: 2019-04-22

## 2019-04-24 ENCOUNTER — ANTI-COAG VISIT (OUTPATIENT)
Dept: HEMATOLOGY/ONCOLOGY | Facility: HOSPITAL | Age: 59
End: 2019-04-24

## 2019-04-24 DIAGNOSIS — I82.412 ACUTE DEEP VEIN THROMBOSIS (DVT) OF FEMORAL VEIN OF LEFT LOWER EXTREMITY (HCC): ICD-10-CM

## 2019-04-26 ENCOUNTER — ANTI-COAG VISIT (OUTPATIENT)
Dept: HEMATOLOGY/ONCOLOGY | Facility: HOSPITAL | Age: 59
End: 2019-04-26

## 2019-04-26 DIAGNOSIS — I82.412 ACUTE DEEP VEIN THROMBOSIS (DVT) OF FEMORAL VEIN OF LEFT LOWER EXTREMITY (HCC): ICD-10-CM

## 2019-04-29 ENCOUNTER — ANTI-COAG VISIT (OUTPATIENT)
Dept: HEMATOLOGY/ONCOLOGY | Facility: HOSPITAL | Age: 59
End: 2019-04-29

## 2019-04-29 DIAGNOSIS — I82.412 ACUTE DEEP VEIN THROMBOSIS (DVT) OF FEMORAL VEIN OF LEFT LOWER EXTREMITY (HCC): ICD-10-CM

## 2019-05-02 ENCOUNTER — ANTI-COAG VISIT (OUTPATIENT)
Dept: HEMATOLOGY/ONCOLOGY | Facility: HOSPITAL | Age: 59
End: 2019-05-02

## 2019-05-02 DIAGNOSIS — I82.412 ACUTE DEEP VEIN THROMBOSIS (DVT) OF FEMORAL VEIN OF LEFT LOWER EXTREMITY (HCC): ICD-10-CM

## 2019-05-08 ENCOUNTER — ANTI-COAG VISIT (OUTPATIENT)
Dept: HEMATOLOGY/ONCOLOGY | Facility: HOSPITAL | Age: 59
End: 2019-05-08

## 2019-05-08 DIAGNOSIS — I82.412 ACUTE DEEP VEIN THROMBOSIS (DVT) OF FEMORAL VEIN OF LEFT LOWER EXTREMITY (HCC): ICD-10-CM

## 2019-05-13 ENCOUNTER — ANTI-COAG VISIT (OUTPATIENT)
Dept: HEMATOLOGY/ONCOLOGY | Facility: HOSPITAL | Age: 59
End: 2019-05-13

## 2019-05-13 DIAGNOSIS — I82.412 ACUTE DEEP VEIN THROMBOSIS (DVT) OF FEMORAL VEIN OF LEFT LOWER EXTREMITY (HCC): ICD-10-CM

## 2019-05-14 ENCOUNTER — TELEPHONE (OUTPATIENT)
Dept: HEMATOLOGY/ONCOLOGY | Facility: HOSPITAL | Age: 59
End: 2019-05-14

## 2019-05-14 NOTE — TELEPHONE ENCOUNTER
HH called with INR results from yesterday INR 1.7 they were never given instructions. Called back LVM with results from anti-coag encounter from yesterday. triage line given if questions.

## 2019-05-17 ENCOUNTER — ANTI-COAG VISIT (OUTPATIENT)
Dept: HEMATOLOGY/ONCOLOGY | Facility: HOSPITAL | Age: 59
End: 2019-05-17

## 2019-05-17 DIAGNOSIS — I82.412 ACUTE DEEP VEIN THROMBOSIS (DVT) OF FEMORAL VEIN OF LEFT LOWER EXTREMITY (HCC): ICD-10-CM

## 2019-05-23 ENCOUNTER — ANTI-COAG VISIT (OUTPATIENT)
Dept: HEMATOLOGY/ONCOLOGY | Facility: HOSPITAL | Age: 59
End: 2019-05-23

## 2019-05-23 DIAGNOSIS — I82.412 ACUTE DEEP VEIN THROMBOSIS (DVT) OF FEMORAL VEIN OF LEFT LOWER EXTREMITY (HCC): ICD-10-CM

## 2019-05-30 ENCOUNTER — ANTI-COAG VISIT (OUTPATIENT)
Dept: HEMATOLOGY/ONCOLOGY | Facility: HOSPITAL | Age: 59
End: 2019-05-30

## 2019-05-30 DIAGNOSIS — I82.412 ACUTE DEEP VEIN THROMBOSIS (DVT) OF FEMORAL VEIN OF LEFT LOWER EXTREMITY (HCC): ICD-10-CM

## 2019-06-14 ENCOUNTER — ANTI-COAG VISIT (OUTPATIENT)
Dept: HEMATOLOGY/ONCOLOGY | Facility: HOSPITAL | Age: 59
End: 2019-06-14

## 2019-06-14 DIAGNOSIS — I82.412 ACUTE DEEP VEIN THROMBOSIS (DVT) OF FEMORAL VEIN OF LEFT LOWER EXTREMITY (HCC): ICD-10-CM

## 2019-06-26 ENCOUNTER — ANTI-COAG VISIT (OUTPATIENT)
Dept: HEMATOLOGY/ONCOLOGY | Facility: HOSPITAL | Age: 59
End: 2019-06-26

## 2019-06-26 DIAGNOSIS — I82.412 ACUTE DEEP VEIN THROMBOSIS (DVT) OF FEMORAL VEIN OF LEFT LOWER EXTREMITY (HCC): ICD-10-CM

## 2019-06-26 RX ORDER — WARFARIN SODIUM 1 MG/1
5 TABLET ORAL NIGHTLY
Qty: 90 TABLET | Refills: 0 | Status: SHIPPED | OUTPATIENT
Start: 2019-06-26 | End: 2019-06-26

## 2019-06-26 RX ORDER — WARFARIN SODIUM 1 MG/1
TABLET ORAL
Qty: 506 TABLET | Refills: 0 | Status: SHIPPED | OUTPATIENT
Start: 2019-06-26 | End: 2019-07-30 | Stop reason: CLARIF

## 2019-06-27 ENCOUNTER — APPOINTMENT (OUTPATIENT)
Dept: GENERAL RADIOLOGY | Facility: HOSPITAL | Age: 59
DRG: 872 | End: 2019-06-27
Attending: EMERGENCY MEDICINE
Payer: MEDICARE

## 2019-06-27 ENCOUNTER — HOSPITAL ENCOUNTER (INPATIENT)
Facility: HOSPITAL | Age: 59
LOS: 3 days | Discharge: HOME HEALTH | DRG: 872 | End: 2019-06-30
Attending: EMERGENCY MEDICINE | Admitting: HOSPITALIST
Payer: MEDICARE

## 2019-06-27 DIAGNOSIS — J18.9 COMMUNITY ACQUIRED PNEUMONIA OF RIGHT UPPER LOBE OF LUNG: ICD-10-CM

## 2019-06-27 DIAGNOSIS — L03.116 CELLULITIS OF LEFT LOWER EXTREMITY: Primary | ICD-10-CM

## 2019-06-27 DIAGNOSIS — Z79.01 WARFARIN ANTICOAGULATION: ICD-10-CM

## 2019-06-27 DIAGNOSIS — R50.9 FEVER, UNSPECIFIED FEVER CAUSE: ICD-10-CM

## 2019-06-27 LAB
ALBUMIN SERPL-MCNC: 2.9 G/DL (ref 3.4–5)
ALBUMIN/GLOB SERPL: 0.7 {RATIO} (ref 1–2)
ALP LIVER SERPL-CCNC: 57 U/L (ref 46–118)
ALT SERPL-CCNC: 17 U/L (ref 13–56)
ANION GAP SERPL CALC-SCNC: 9 MMOL/L (ref 0–18)
APTT PPP: 36 SECONDS (ref 25.4–36.1)
AST SERPL-CCNC: 13 U/L (ref 15–37)
ATRIAL RATE: 53 BPM
BASOPHILS # BLD AUTO: 0.02 X10(3) UL (ref 0–0.2)
BASOPHILS NFR BLD AUTO: 0.1 %
BILIRUB SERPL-MCNC: 0.3 MG/DL (ref 0.1–2)
BILIRUB UR QL STRIP.AUTO: NEGATIVE
BUN BLD-MCNC: 14 MG/DL (ref 7–18)
BUN/CREAT SERPL: 26.4 (ref 10–20)
CALCIUM BLD-MCNC: 8.3 MG/DL (ref 8.5–10.1)
CHLORIDE SERPL-SCNC: 109 MMOL/L (ref 98–112)
CLARITY UR REFRACT.AUTO: CLEAR
CO2 SERPL-SCNC: 22 MMOL/L (ref 21–32)
COLOR UR AUTO: YELLOW
CREAT BLD-MCNC: 0.53 MG/DL (ref 0.55–1.02)
DEPRECATED RDW RBC AUTO: 44.4 FL (ref 35.1–46.3)
EOSINOPHIL # BLD AUTO: 0 X10(3) UL (ref 0–0.7)
EOSINOPHIL NFR BLD AUTO: 0 %
ERYTHROCYTE [DISTWIDTH] IN BLOOD BY AUTOMATED COUNT: 13.5 % (ref 11–15)
GLOBULIN PLAS-MCNC: 3.9 G/DL (ref 2.8–4.4)
GLUCOSE BLD-MCNC: 118 MG/DL (ref 70–99)
GLUCOSE UR STRIP.AUTO-MCNC: NEGATIVE MG/DL
HCT VFR BLD AUTO: 38.1 % (ref 35–48)
HGB BLD-MCNC: 12.4 G/DL (ref 12–16)
IMM GRANULOCYTES # BLD AUTO: 0.17 X10(3) UL (ref 0–1)
IMM GRANULOCYTES NFR BLD: 1 %
INR BLD: 2.16 (ref 0.9–1.1)
LACTATE SERPL-SCNC: 0.9 MMOL/L (ref 0.4–2)
LEUKOCYTE ESTERASE UR QL STRIP.AUTO: NEGATIVE
LYMPHOCYTES # BLD AUTO: 0.42 X10(3) UL (ref 1–4)
LYMPHOCYTES NFR BLD AUTO: 2.5 %
M PROTEIN MFR SERPL ELPH: 6.8 G/DL (ref 6.4–8.2)
MCH RBC QN AUTO: 29.2 PG (ref 26–34)
MCHC RBC AUTO-ENTMCNC: 32.5 G/DL (ref 31–37)
MCV RBC AUTO: 89.9 FL (ref 80–100)
MONOCYTES # BLD AUTO: 0.41 X10(3) UL (ref 0.1–1)
MONOCYTES NFR BLD AUTO: 2.4 %
NEUTROPHILS # BLD AUTO: 15.74 X10 (3) UL (ref 1.5–7.7)
NEUTROPHILS # BLD AUTO: 15.74 X10(3) UL (ref 1.5–7.7)
NEUTROPHILS NFR BLD AUTO: 94 %
NITRITE UR QL STRIP.AUTO: NEGATIVE
OSMOLALITY SERPL CALC.SUM OF ELEC: 292 MOSM/KG (ref 275–295)
PH UR STRIP.AUTO: 5 [PH] (ref 4.5–8)
PLATELET # BLD AUTO: 119 10(3)UL (ref 150–450)
POTASSIUM SERPL-SCNC: 3.4 MMOL/L (ref 3.5–5.1)
PROT UR STRIP.AUTO-MCNC: NEGATIVE MG/DL
PSA SERPL DL<=0.01 NG/ML-MCNC: 25.4 SECONDS (ref 12.5–14.7)
Q-T INTERVAL: 468 MS
QRS DURATION: 78 MS
QTC CALCULATION (BEZET): 647 MS
R AXIS: -2 DEGREES
RBC # BLD AUTO: 4.24 X10(6)UL (ref 3.8–5.3)
SODIUM SERPL-SCNC: 140 MMOL/L (ref 136–145)
SP GR UR STRIP.AUTO: 1.02 (ref 1–1.03)
T AXIS: 30 DEGREES
UROBILINOGEN UR STRIP.AUTO-MCNC: <2 MG/DL
VENTRICULAR RATE: 115 BPM
WBC # BLD AUTO: 16.8 X10(3) UL (ref 4–11)

## 2019-06-27 PROCEDURE — 71045 X-RAY EXAM CHEST 1 VIEW: CPT | Performed by: EMERGENCY MEDICINE

## 2019-06-27 PROCEDURE — 99223 1ST HOSP IP/OBS HIGH 75: CPT | Performed by: HOSPITALIST

## 2019-06-27 RX ORDER — WARFARIN SODIUM 5 MG/1
5 TABLET ORAL
Status: DISCONTINUED | OUTPATIENT
Start: 2019-06-29 | End: 2019-06-30

## 2019-06-27 RX ORDER — GARLIC EXTRACT 500 MG
1 CAPSULE ORAL DAILY
Status: DISCONTINUED | OUTPATIENT
Start: 2019-06-27 | End: 2019-06-30

## 2019-06-27 RX ORDER — HYDROCODONE BITARTRATE AND ACETAMINOPHEN 5; 325 MG/1; MG/1
1 TABLET ORAL EVERY 4 HOURS PRN
Status: DISCONTINUED | OUTPATIENT
Start: 2019-06-27 | End: 2019-06-30

## 2019-06-27 RX ORDER — ACETAMINOPHEN 500 MG
1000 TABLET ORAL ONCE
Status: COMPLETED | OUTPATIENT
Start: 2019-06-27 | End: 2019-06-27

## 2019-06-27 RX ORDER — SODIUM CHLORIDE 9 MG/ML
INJECTION, SOLUTION INTRAVENOUS CONTINUOUS
Status: DISCONTINUED | OUTPATIENT
Start: 2019-06-27 | End: 2019-06-28

## 2019-06-27 RX ORDER — WARFARIN SODIUM 2 MG/1
3 TABLET ORAL
Status: DISCONTINUED | OUTPATIENT
Start: 2019-06-28 | End: 2019-06-30

## 2019-06-27 RX ORDER — PHENYTOIN SODIUM 100 MG/1
300 CAPSULE, EXTENDED RELEASE ORAL DAILY
Status: DISCONTINUED | OUTPATIENT
Start: 2019-06-28 | End: 2019-06-30

## 2019-06-27 RX ORDER — POTASSIUM CHLORIDE 20 MEQ/1
40 TABLET, EXTENDED RELEASE ORAL EVERY 4 HOURS
Status: COMPLETED | OUTPATIENT
Start: 2019-06-27 | End: 2019-06-28

## 2019-06-27 RX ORDER — ONDANSETRON 2 MG/ML
4 INJECTION INTRAMUSCULAR; INTRAVENOUS EVERY 6 HOURS PRN
Status: DISCONTINUED | OUTPATIENT
Start: 2019-06-27 | End: 2019-06-30

## 2019-06-27 RX ORDER — WARFARIN SODIUM 5 MG/1
5 TABLET ORAL NIGHTLY
Status: ON HOLD | COMMUNITY
End: 2019-06-27

## 2019-06-27 RX ORDER — CEPHALEXIN 500 MG/1
500 CAPSULE ORAL 2 TIMES DAILY
Status: ON HOLD | COMMUNITY
End: 2019-06-30

## 2019-06-27 RX ORDER — ACETAMINOPHEN 325 MG/1
650 TABLET ORAL EVERY 4 HOURS PRN
Status: DISCONTINUED | OUTPATIENT
Start: 2019-06-27 | End: 2019-06-30

## 2019-06-27 RX ORDER — ACETAMINOPHEN 160 MG
2000 TABLET,DISINTEGRATING ORAL DAILY
Status: DISCONTINUED | OUTPATIENT
Start: 2019-06-28 | End: 2019-06-30

## 2019-06-27 RX ORDER — HYDROCODONE BITARTRATE AND ACETAMINOPHEN 5; 325 MG/1; MG/1
2 TABLET ORAL EVERY 4 HOURS PRN
Status: DISCONTINUED | OUTPATIENT
Start: 2019-06-27 | End: 2019-06-30

## 2019-06-27 NOTE — ED INITIAL ASSESSMENT (HPI)
Pt presents to ed with cellulitis of left leg, leg is hot to touch and red.  Pt has fever of 103.0 upon arrival

## 2019-06-28 LAB
ALBUMIN SERPL-MCNC: 2.2 G/DL (ref 3.4–5)
ALBUMIN/GLOB SERPL: 0.5 {RATIO} (ref 1–2)
ALP LIVER SERPL-CCNC: 50 U/L (ref 46–118)
ALT SERPL-CCNC: 16 U/L (ref 13–56)
ANION GAP SERPL CALC-SCNC: 7 MMOL/L (ref 0–18)
AST SERPL-CCNC: 16 U/L (ref 15–37)
BASOPHILS # BLD AUTO: 0.02 X10(3) UL (ref 0–0.2)
BASOPHILS NFR BLD AUTO: 0.1 %
BILIRUB SERPL-MCNC: 0.4 MG/DL (ref 0.1–2)
BUN BLD-MCNC: 10 MG/DL (ref 7–18)
BUN/CREAT SERPL: 21.3 (ref 10–20)
CALCIUM BLD-MCNC: 8.2 MG/DL (ref 8.5–10.1)
CHLORIDE SERPL-SCNC: 113 MMOL/L (ref 98–112)
CO2 SERPL-SCNC: 17 MMOL/L (ref 21–32)
CREAT BLD-MCNC: 0.47 MG/DL (ref 0.55–1.02)
DEPRECATED RDW RBC AUTO: 44.4 FL (ref 35.1–46.3)
EOSINOPHIL # BLD AUTO: 0.01 X10(3) UL (ref 0–0.7)
EOSINOPHIL NFR BLD AUTO: 0.1 %
ERYTHROCYTE [DISTWIDTH] IN BLOOD BY AUTOMATED COUNT: 13.6 % (ref 11–15)
GLOBULIN PLAS-MCNC: 4.2 G/DL (ref 2.8–4.4)
GLUCOSE BLD-MCNC: 99 MG/DL (ref 70–99)
HCT VFR BLD AUTO: 35.9 % (ref 35–48)
HGB BLD-MCNC: 11.8 G/DL (ref 12–16)
IMM GRANULOCYTES # BLD AUTO: 0.06 X10(3) UL (ref 0–1)
IMM GRANULOCYTES NFR BLD: 0.4 %
INR BLD: 2.27 (ref 0.9–1.1)
LYMPHOCYTES # BLD AUTO: 1.06 X10(3) UL (ref 1–4)
LYMPHOCYTES NFR BLD AUTO: 7.7 %
M PROTEIN MFR SERPL ELPH: 6.4 G/DL (ref 6.4–8.2)
MCH RBC QN AUTO: 29.3 PG (ref 26–34)
MCHC RBC AUTO-ENTMCNC: 32.9 G/DL (ref 31–37)
MCV RBC AUTO: 89.1 FL (ref 80–100)
MONOCYTES # BLD AUTO: 0.69 X10(3) UL (ref 0.1–1)
MONOCYTES NFR BLD AUTO: 5 %
NEUTROPHILS # BLD AUTO: 12 X10 (3) UL (ref 1.5–7.7)
NEUTROPHILS # BLD AUTO: 12 X10(3) UL (ref 1.5–7.7)
NEUTROPHILS NFR BLD AUTO: 86.7 %
OSMOLALITY SERPL CALC.SUM OF ELEC: 283 MOSM/KG (ref 275–295)
PLATELET # BLD AUTO: 110 10(3)UL (ref 150–450)
POTASSIUM SERPL-SCNC: 4 MMOL/L (ref 3.5–5.1)
POTASSIUM SERPL-SCNC: 4 MMOL/L (ref 3.5–5.1)
PSA SERPL DL<=0.01 NG/ML-MCNC: 26.5 SECONDS (ref 12.5–14.7)
RBC # BLD AUTO: 4.03 X10(6)UL (ref 3.8–5.3)
SODIUM SERPL-SCNC: 137 MMOL/L (ref 136–145)
WBC # BLD AUTO: 13.8 X10(3) UL (ref 4–11)

## 2019-06-28 PROCEDURE — 99232 SBSQ HOSP IP/OBS MODERATE 35: CPT | Performed by: HOSPITALIST

## 2019-06-28 RX ORDER — CEFAZOLIN SODIUM/WATER 2 G/20 ML
2 SYRINGE (ML) INTRAVENOUS EVERY 8 HOURS
Status: DISCONTINUED | OUTPATIENT
Start: 2019-06-28 | End: 2019-06-30

## 2019-06-28 NOTE — ED PROVIDER NOTES
Patient Seen in: [facility] Emergency Department    History   Patient presents with:  Fever (infectious)  Cellulitis (integumentary, infectious)    Stated Complaint: fever,     HPI    43-year-old female presents to the emergency department with a 1 Normocephalic and atraumatic. Mouth/Throat: Oropharynx is clear and moist.   Eyes: Pupils are equal, round, and reactive to light. EOM are normal.   Neck: Normal range of motion. Neck supple.    Cardiovascular: Normal rate, regular rhythm, normal heart so Neutrophil Absolute Prelim 15.74 (*)     Neutrophil Absolute 15.74 (*)     Lymphocyte Absolute 0.42 (*)     All other components within normal limits   LACTIC ACID, PLASMA - Normal   PTT, ACTIVATED - Normal   CBC WITH DIFFERENTIAL WITH PLATELET    Jada Delcid was a significant delay in getting her blood work as she was a very difficult stick. Because of this we did not have initial blood work and sepsis protocol was slightly delayed.   She had a brief drop in her blood pressure but it came up nicely just with f

## 2019-06-28 NOTE — PROGRESS NOTES
[facility] HOSPITALIST  Progress Note     Jason Patel Patient Status:  Inpatient    1960 MRN [id number]   [facility] 3NE-A Attending Julissa Murdock MD   Hosp Day # 1 PCP Zahraa Clinton     Chief Complaint: left leg cellulitis    S: • [START ON 6/29/2019] Warfarin Sodium  5 mg Oral Once per day on Sun Mon Tue Wed Thu Sat   • Warfarin Sodium  3 mg Oral Once per day on Fri       ASSESSMENT / PLAN:     1. Sepsis: resolving. DC IVF  2. LLE cellulitis: change to ancef.   Will need OP ID idris

## 2019-06-28 NOTE — PLAN OF CARE
Assumed pt care @ 0730. Alert & oriented x 3. VSS. Denies pain. Seizure precautions. No seizure activity noted. On room air. SR-ST on telemetry. On coumadin. BLE edema, L>R. LLE red, hot to touch. IV ABX administered as ordered. ID following.   Tolerating c

## 2019-06-28 NOTE — CM/SW NOTE
06/28/19 1100   CM/SW Referral Data   Referral Source Physician   Reason for Referral Discharge planning   Informant Patient   Social History   Recreational Drug/Alcohol Use no   Major Changes Last 6 Months no   Domestic/Partner Violence no   Suicidal I

## 2019-06-28 NOTE — SLP NOTE
ADULT SWALLOWING EVALUATION    ASSESSMENT    ASSESSMENT/OVERALL IMPRESSION:  62 y.o female presented to ED with fever and LE erythema. Diagnosed with sepsis due to LE cellulitis, as well as questionable RUL consolidation.  Pt with sig med hx including CP an re-consult if status changes. Video swallow study to be considered if CXR declines, increase in clinical signs of aspiration, and/or MD desires.       RECOMMENDATIONS   Diet Recommendations - Solids: Mechanical soft chopped  Diet Recommendations - Liquid: lingual;Reduced left lingual       Voice Quality: Clear  Respiratory Status: Unlabored  Consistencies Trialed:  Thin liquids;Puree;Hard solid  Method of Presentation: Staff/Clinician assistance;Spoon;Straw  Patient Positioning: Upright;Midline    Oral Phase

## 2019-06-29 LAB
INR BLD: 2.12 (ref 0.9–1.1)
PSA SERPL DL<=0.01 NG/ML-MCNC: 25.1 SECONDS (ref 12.5–14.7)

## 2019-06-29 PROCEDURE — 99232 SBSQ HOSP IP/OBS MODERATE 35: CPT | Performed by: HOSPITALIST

## 2019-06-29 NOTE — PLAN OF CARE
A/o x4. Forgetful. Difficult to understand speech  Room air. VSS. NSR on tele. IV ancef Q8 hrs  Legs swollen, L>R. Left leg red and hot to touch  On coumadin  Briefed, incontinent  Chopped diet. Needs assistance for feeding.   Turning patient Q2 hrs  Rowena

## 2019-06-29 NOTE — PROGRESS NOTES
[facility] HOSPITALIST  Progress Note     Sanchez Cast Patient Status:  Inpatient    1960 MRN [id number]   [facility] 3NE-A Attending Bruce Javed MD   Hosp Day # 2 PCP Harpreet Craig     Chief Complaint: left leg cellulitis    S: • Phenytoin Sodium Extended  300 mg Oral Daily   • Warfarin Sodium  5 mg Oral Once per day on Sun Mon Tue Wed Thu Sat   • Warfarin Sodium  3 mg Oral Once per day on Fri       ASSESSMENT / PLAN:     1. Sepsis: resolving  2. LLE cellulitis: cont ancef.   Will

## 2019-06-29 NOTE — PLAN OF CARE
Assumed care at 11:45am  Aox4  VSS, on RA  Tele- NSR  IV ancef  No c/o pain at this time  Pain controlled per [facility]  Electrolyte protocol   Legs elevated on 3 pillows   Seizure precautions  Wheelchair bound    Problem: Impaired Swallowing  Goal: Minimize asp

## 2019-06-30 VITALS
DIASTOLIC BLOOD PRESSURE: 85 MMHG | HEART RATE: 92 BPM | TEMPERATURE: 98 F | BODY MASS INDEX: 30 KG/M2 | SYSTOLIC BLOOD PRESSURE: 155 MMHG | WEIGHT: 169.38 LBS | RESPIRATION RATE: 18 BRPM | OXYGEN SATURATION: 94 %

## 2019-06-30 LAB
INR BLD: 2.1 (ref 0.9–1.1)
PSA SERPL DL<=0.01 NG/ML-MCNC: 24.9 SECONDS (ref 12.5–14.7)

## 2019-06-30 PROCEDURE — 99239 HOSP IP/OBS DSCHRG MGMT >30: CPT | Performed by: HOSPITALIST

## 2019-06-30 RX ORDER — PENICILLIN V POTASSIUM 500 MG/1
500 TABLET ORAL 2 TIMES DAILY
Qty: 60 TABLET | Refills: 3 | Status: ON HOLD | OUTPATIENT
Start: 2019-07-14 | End: 2019-08-02

## 2019-06-30 RX ORDER — PENICILLIN V POTASSIUM 500 MG/1
500 TABLET ORAL 2 TIMES DAILY
Qty: 60 TABLET | Refills: 0 | Status: SHIPPED | OUTPATIENT
Start: 2019-06-30 | End: 2019-06-30

## 2019-06-30 RX ORDER — CEPHALEXIN 500 MG/1
500 CAPSULE ORAL 3 TIMES DAILY
Qty: 42 CAPSULE | Refills: 0 | Status: SHIPPED | OUTPATIENT
Start: 2019-06-30 | End: 2019-07-14

## 2019-06-30 NOTE — PLAN OF CARE
Pt is aox4. D/t cp can be difficult to understand. Frequent urination and pt is able to tell you she is wet and needs to be changed. Legs are being elevated with 2-3 pillows. Meds given in applesauce.    Problem: Impaired Swallowing  Goal: Minimize aspira

## 2019-06-30 NOTE — PROGRESS NOTES
NURSING DISCHARGE NOTE    Discharged Home via Ambulance. Accompanied by Support staff  Belongings Taken by patient/family. PIV and tele removed. Pt mother contacted and updated about plans for discharge.  SW contacted [facility] and informed them of

## 2019-06-30 NOTE — CM/SW NOTE
MSW spoke with RN. MSW notified [facility] of the patient's discharge today via [facility]. Updates sent. MSW requested [employer] for 1:30  going to her home. RN will call family. PCS form placed on chart.     Ron Mauro LCSW

## 2019-06-30 NOTE — PROGRESS NOTES
Patient seen and examined. Medically clear to discharge today.  Keflex x 2 weeks followed by 3 months suppressive therapy with ARGENIS Hancock MD

## 2019-07-01 ENCOUNTER — ANTI-COAG VISIT (OUTPATIENT)
Dept: HEMATOLOGY/ONCOLOGY | Facility: HOSPITAL | Age: 59
End: 2019-07-01

## 2019-07-01 DIAGNOSIS — I82.412 ACUTE DEEP VEIN THROMBOSIS (DVT) OF FEMORAL VEIN OF LEFT LOWER EXTREMITY (HCC): ICD-10-CM

## 2019-07-01 LAB — INR: 1.5 (ref 0.8–1.2)

## 2019-07-01 NOTE — DISCHARGE SUMMARY
[facility] HOSPITALIST  DISCHARGE SUMMARY     Skokie Like Patient Status:  Inpatient    1960 MRN [id number]   [facility] [place] 3NE-A Attending No att. providers found   Hosp Day # 3 PCP Maribell Johnson     Date of Admission: 2019  Date Tabs  Commonly known as:  VEETID  Start taking on:  7/14/2019      Take 1 tablet (500 mg total) by mouth 2 (two) times daily.    Quantity:  60 tablet  Refills:  3        CHANGE how you take these medications      Instructions Prescription details   cephALEX [id number]  [phone]    Schedule an appointment as soon as possible for a visit      Appointments for Next 30 Days 7/1/2019 - 7/31/2019    None          Vital signs:       Physical Exam:    General: No acute distress.    Respiratory: Clear to auscultati

## 2019-07-11 ENCOUNTER — ANTI-COAG VISIT (OUTPATIENT)
Dept: HEMATOLOGY/ONCOLOGY | Facility: HOSPITAL | Age: 59
End: 2019-07-11

## 2019-07-11 DIAGNOSIS — I82.412 ACUTE DEEP VEIN THROMBOSIS (DVT) OF FEMORAL VEIN OF LEFT LOWER EXTREMITY (HCC): ICD-10-CM

## 2019-07-11 LAB — INR: 1 (ref 0.8–1.2)

## 2019-07-11 RX ORDER — WARFARIN SODIUM 5 MG/1
5 TABLET ORAL NIGHTLY
Qty: 30 TABLET | Refills: 0 | Status: SHIPPED | OUTPATIENT
Start: 2019-07-11 | End: 2019-07-30 | Stop reason: CLARIF

## 2019-07-15 ENCOUNTER — ANTI-COAG VISIT (OUTPATIENT)
Dept: HEMATOLOGY/ONCOLOGY | Facility: HOSPITAL | Age: 59
End: 2019-07-15

## 2019-07-15 DIAGNOSIS — I82.412 ACUTE DEEP VEIN THROMBOSIS (DVT) OF FEMORAL VEIN OF LEFT LOWER EXTREMITY (HCC): ICD-10-CM

## 2019-07-15 LAB — INR: 1.4 (ref 0.8–1.2)

## 2019-07-22 NOTE — ED PROVIDER NOTES
Patient Seen in: [facility] Emergency Department    History   Patient presents with:  Dyspnea ERI SOB (respiratory)    Stated Complaint: SOB    HPI    49-year-old female with history of cerebral palsy, chronic lower extremity lymphedema, presents em daily.        Family History   Problem Relation Age of Onset   • Family history unknown: Yes         Smoking Status: Never Smoker                      Smokeless Status: Never Used                        Alcohol Use: No                Review of Systems    Po no rigidity, no guarding. no pulsatile masses. No CVA tenderness  EXTREMITIES: No tenderness to the bilateral upper or lower extremities, pelvis stable no tenderness bilateral hips.   Chronic lymphedema bilateral lower extremities, no calf tenderness, dorsal "started 27 years ago when a window fell on me"

## 2019-07-23 ENCOUNTER — ANTI-COAG VISIT (OUTPATIENT)
Dept: HEMATOLOGY/ONCOLOGY | Facility: HOSPITAL | Age: 59
End: 2019-07-23

## 2019-07-23 DIAGNOSIS — I82.412 ACUTE DEEP VEIN THROMBOSIS (DVT) OF FEMORAL VEIN OF LEFT LOWER EXTREMITY (HCC): ICD-10-CM

## 2019-07-23 LAB — INR: 3.3 (ref 0.8–1.2)

## 2019-07-30 ENCOUNTER — APPOINTMENT (OUTPATIENT)
Dept: GENERAL RADIOLOGY | Facility: HOSPITAL | Age: 59
DRG: 871 | End: 2019-07-30
Attending: EMERGENCY MEDICINE
Payer: MEDICARE

## 2019-07-30 ENCOUNTER — HOSPITAL ENCOUNTER (INPATIENT)
Facility: HOSPITAL | Age: 59
LOS: 3 days | Discharge: HOME HEALTH | DRG: 871 | End: 2019-08-02
Attending: EMERGENCY MEDICINE | Admitting: HOSPITALIST
Payer: MEDICARE

## 2019-07-30 DIAGNOSIS — L03.116 CELLULITIS OF LEFT LOWER EXTREMITY: Primary | ICD-10-CM

## 2019-07-30 DIAGNOSIS — A41.9 SEPSIS, DUE TO UNSPECIFIED ORGANISM: ICD-10-CM

## 2019-07-30 LAB
ALBUMIN SERPL-MCNC: 3.5 G/DL (ref 3.4–5)
ALBUMIN/GLOB SERPL: 0.6 {RATIO} (ref 1–2)
ALP LIVER SERPL-CCNC: 78 U/L (ref 46–118)
ANION GAP SERPL CALC-SCNC: 2 MMOL/L (ref 0–18)
APTT PPP: 46.6 SECONDS (ref 25.4–36.1)
ATRIAL RATE: 116 BPM
BASOPHILS # BLD AUTO: 0.04 X10(3) UL (ref 0–0.2)
BASOPHILS NFR BLD AUTO: 0.2 %
BILIRUB SERPL-MCNC: 0.5 MG/DL (ref 0.1–2)
BUN BLD-MCNC: 19 MG/DL (ref 7–18)
BUN/CREAT SERPL: 23.8 (ref 10–20)
CALCIUM BLD-MCNC: 9.2 MG/DL (ref 8.5–10.1)
CHLORIDE SERPL-SCNC: 104 MMOL/L (ref 98–112)
CO2 SERPL-SCNC: 25 MMOL/L (ref 21–32)
CREAT BLD-MCNC: 0.8 MG/DL (ref 0.55–1.02)
DEPRECATED RDW RBC AUTO: 45.5 FL (ref 35.1–46.3)
EOSINOPHIL # BLD AUTO: 0 X10(3) UL (ref 0–0.7)
EOSINOPHIL NFR BLD AUTO: 0 %
ERYTHROCYTE [DISTWIDTH] IN BLOOD BY AUTOMATED COUNT: 13.7 % (ref 11–15)
GLOBULIN PLAS-MCNC: 6.1 G/DL (ref 2.8–4.4)
GLUCOSE BLD-MCNC: 100 MG/DL (ref 70–99)
HCT VFR BLD AUTO: 45.5 % (ref 35–48)
HGB BLD-MCNC: 14.6 G/DL (ref 12–16)
IMM GRANULOCYTES # BLD AUTO: 0.17 X10(3) UL (ref 0–1)
IMM GRANULOCYTES NFR BLD: 0.9 %
INR BLD: 3.13 (ref 0.9–1.1)
INR: 4.3 (ref 0.8–1.2)
LACTATE SERPL-SCNC: 1.7 MMOL/L (ref 0.4–2)
LYMPHOCYTES # BLD AUTO: 0.54 X10(3) UL (ref 1–4)
LYMPHOCYTES NFR BLD AUTO: 2.9 %
M PROTEIN MFR SERPL ELPH: 9.6 G/DL (ref 6.4–8.2)
MCH RBC QN AUTO: 29.1 PG (ref 26–34)
MCHC RBC AUTO-ENTMCNC: 32.1 G/DL (ref 31–37)
MCV RBC AUTO: 90.8 FL (ref 80–100)
MONOCYTES # BLD AUTO: 0.47 X10(3) UL (ref 0.1–1)
MONOCYTES NFR BLD AUTO: 2.6 %
NEUTROPHILS # BLD AUTO: 17.16 X10 (3) UL (ref 1.5–7.7)
NEUTROPHILS # BLD AUTO: 17.16 X10(3) UL (ref 1.5–7.7)
NEUTROPHILS NFR BLD AUTO: 93.4 %
OSMOLALITY SERPL CALC.SUM OF ELEC: 274 MOSM/KG (ref 275–295)
P AXIS: 47 DEGREES
P-R INTERVAL: 142 MS
PLATELET # BLD AUTO: 136 10(3)UL (ref 150–450)
PSA SERPL DL<=0.01 NG/ML-MCNC: 34.4 SECONDS (ref 12.5–14.7)
Q-T INTERVAL: 290 MS
QRS DURATION: 74 MS
QTC CALCULATION (BEZET): 403 MS
R AXIS: -4 DEGREES
RBC # BLD AUTO: 5.01 X10(6)UL (ref 3.8–5.3)
SODIUM SERPL-SCNC: 131 MMOL/L (ref 136–145)
T AXIS: 23 DEGREES
VENTRICULAR RATE: 116 BPM
WBC # BLD AUTO: 18.4 X10(3) UL (ref 4–11)

## 2019-07-30 PROCEDURE — 99223 1ST HOSP IP/OBS HIGH 75: CPT | Performed by: HOSPITALIST

## 2019-07-30 PROCEDURE — 71045 X-RAY EXAM CHEST 1 VIEW: CPT | Performed by: EMERGENCY MEDICINE

## 2019-07-30 RX ORDER — SODIUM CHLORIDE 9 MG/ML
INJECTION, SOLUTION INTRAVENOUS CONTINUOUS
Status: DISCONTINUED | OUTPATIENT
Start: 2019-07-30 | End: 2019-07-31

## 2019-07-30 RX ORDER — GARLIC EXTRACT 500 MG
1 CAPSULE ORAL DAILY
Status: DISCONTINUED | OUTPATIENT
Start: 2019-07-30 | End: 2019-08-02

## 2019-07-30 RX ORDER — SODIUM CHLORIDE 9 MG/ML
INJECTION, SOLUTION INTRAVENOUS CONTINUOUS
Status: ACTIVE | OUTPATIENT
Start: 2019-07-30 | End: 2019-07-30

## 2019-07-30 RX ORDER — WARFARIN SODIUM 6 MG/1
6 TABLET ORAL SEE ADMIN INSTRUCTIONS
COMMUNITY
End: 2019-10-23

## 2019-07-30 RX ORDER — WARFARIN SODIUM 5 MG/1
5 TABLET ORAL SEE ADMIN INSTRUCTIONS
COMMUNITY
End: 2019-11-18

## 2019-07-30 RX ORDER — WARFARIN SODIUM 1 MG/1
1 TABLET ORAL NIGHTLY
Status: DISCONTINUED | OUTPATIENT
Start: 2019-07-30 | End: 2019-07-30

## 2019-07-30 RX ORDER — ONDANSETRON 2 MG/ML
4 INJECTION INTRAMUSCULAR; INTRAVENOUS ONCE
Status: COMPLETED | OUTPATIENT
Start: 2019-07-30 | End: 2019-07-30

## 2019-07-30 RX ORDER — ASCORBIC ACID 500 MG
500 TABLET ORAL DAILY
Status: DISCONTINUED | OUTPATIENT
Start: 2019-07-30 | End: 2019-08-02

## 2019-07-30 RX ORDER — PHENYTOIN SODIUM 100 MG/1
300 CAPSULE, EXTENDED RELEASE ORAL DAILY
Status: DISCONTINUED | OUTPATIENT
Start: 2019-07-30 | End: 2019-08-02

## 2019-07-30 NOTE — ED PROVIDER NOTES
Patient Seen in: [facility] Emergency Department    History   Patient presents with:  Fever (infectious)  Nausea/Vomiting/Diarrhea (gastrointestinal)    Stated Complaint: FEVER, VOMITING, CELLULITIS    HPI    Patient is a 59-year-old female with cer tachypnea. Lungs clear to auscultation bilaterally without rales/rhonchi. Equal breath sounds bilaterally  Cardiac: No tachycardia. No murmurs. Regular rate and rhythm. Abdomen: Soft and nontender throughout.   No rebound or guarding  Extremities: Righ wave progression. Nonspecific ST-T wave changes. Axis/intervals are noted. Otherwise, agree with EKG report              Chest x-ray: Normal pulmonary vascularity. Atelectasis. No pneumonia.   Report, x-ray reviewed    MDM   Patient presents with acute

## 2019-07-30 NOTE — ED NOTES
Patient remains updated with results and plan for admission, waiting for new bed assignment to be cleaned. Alert and appropriate. VS remain stable.

## 2019-07-30 NOTE — ED NOTES
[facility] and [facility] nurse has been at bedside as well. hospitalist has seen patient. Patient remains alert and baseline MS. Answering questions and following commands.  Transport to be arranged for patient after CMP results avai

## 2019-07-30 NOTE — ED NOTES
Patient sleeping intermittently, appears comfortable. Responds to her name. Waiting for transport at this time. No distress observed. IV antibiotic infusion complete. IV saline locked at this time. VS stable.

## 2019-07-30 NOTE — ED INITIAL ASSESSMENT (HPI)
Patient arrives from home c/o worsening left lower leg redness/swelling, chronic cellulitis to leg and hx of sepsis. Patient reports fever of 102 as well as nausea/vomiting. Reports getting tylenol around 730 am. Patient denies pain at this time.  Baseline

## 2019-07-30 NOTE — ED NOTES
Informed that patient's bed will be changing d/t needing lift assistance. MD states patient is okay to go upstairs, CMP results have been reviewed by him. Will arrange transport once new bed is assigned.

## 2019-07-30 NOTE — H&P
[facility] HOSPITALIST  History and Physical     Sadia Marcus Patient Status:  Emergency    1960 MRN [id number]   Location [facility] Attending Holli Betacnourt MD   Hosp Day # 0 PCP Jennifer Ybarra     Chief Complaint: facility-administered medications on file prior to encounter. Current Outpatient Medications on File Prior to Encounter:  Warfarin Sodium 5 MG Oral Tab Take 1 tablet (5 mg total) by mouth nightly.  Disp: 30 tablet Rfl: 0   penicillin v potassium 500 MG Or Appropriate mood and affect.       Diagnostic Data:      Labs:  Recent Labs   Lab 07/30/19  1520 07/30/19  1531   WBC  --  18.4*   HGB  --  14.6   MCV  --  90.8   PLT  --  136.0*   INR 3.13*  --        No results for input(s): GLU, BUN, CREATSERUM, GFRAA, G

## 2019-07-31 ENCOUNTER — ANTI-COAG VISIT (OUTPATIENT)
Dept: HEMATOLOGY/ONCOLOGY | Facility: HOSPITAL | Age: 59
End: 2019-07-31

## 2019-07-31 DIAGNOSIS — I82.412 ACUTE DEEP VEIN THROMBOSIS (DVT) OF FEMORAL VEIN OF LEFT LOWER EXTREMITY (HCC): ICD-10-CM

## 2019-07-31 LAB
ANION GAP SERPL CALC-SCNC: 7 MMOL/L (ref 0–18)
BASOPHILS # BLD AUTO: 0.03 X10(3) UL (ref 0–0.2)
BASOPHILS NFR BLD AUTO: 0.2 %
BUN BLD-MCNC: 15 MG/DL (ref 7–18)
BUN/CREAT SERPL: 22.4 (ref 10–20)
CALCIUM BLD-MCNC: 8 MG/DL (ref 8.5–10.1)
CHLORIDE SERPL-SCNC: 114 MMOL/L (ref 98–112)
CO2 SERPL-SCNC: 21 MMOL/L (ref 21–32)
CREAT BLD-MCNC: 0.67 MG/DL (ref 0.55–1.02)
DEPRECATED RDW RBC AUTO: 46.7 FL (ref 35.1–46.3)
EOSINOPHIL # BLD AUTO: 0 X10(3) UL (ref 0–0.7)
EOSINOPHIL NFR BLD AUTO: 0 %
ERYTHROCYTE [DISTWIDTH] IN BLOOD BY AUTOMATED COUNT: 13.8 % (ref 11–15)
GLUCOSE BLD-MCNC: 93 MG/DL (ref 70–99)
HCT VFR BLD AUTO: 35.9 % (ref 35–48)
HGB BLD-MCNC: 11.6 G/DL (ref 12–16)
IMM GRANULOCYTES # BLD AUTO: 0.08 X10(3) UL (ref 0–1)
IMM GRANULOCYTES NFR BLD: 0.6 %
INR BLD: 3.79 (ref 0.9–1.1)
LYMPHOCYTES # BLD AUTO: 0.89 X10(3) UL (ref 1–4)
LYMPHOCYTES NFR BLD AUTO: 7 %
MCH RBC QN AUTO: 29.4 PG (ref 26–34)
MCHC RBC AUTO-ENTMCNC: 32.3 G/DL (ref 31–37)
MCV RBC AUTO: 90.9 FL (ref 80–100)
MONOCYTES # BLD AUTO: 0.51 X10(3) UL (ref 0.1–1)
MONOCYTES NFR BLD AUTO: 4 %
NEUTROPHILS # BLD AUTO: 11.14 X10 (3) UL (ref 1.5–7.7)
NEUTROPHILS # BLD AUTO: 11.14 X10(3) UL (ref 1.5–7.7)
NEUTROPHILS NFR BLD AUTO: 88.2 %
OSMOLALITY SERPL CALC.SUM OF ELEC: 295 MOSM/KG (ref 275–295)
PHENYTOIN SERPL-MCNC: 10.6 UG/ML (ref 10–20)
PLATELET # BLD AUTO: 107 10(3)UL (ref 150–450)
POTASSIUM SERPL-SCNC: 3.3 MMOL/L (ref 3.5–5.1)
POTASSIUM SERPL-SCNC: 4.2 MMOL/L (ref 3.5–5.1)
PSA SERPL DL<=0.01 NG/ML-MCNC: 40.2 SECONDS (ref 12.5–14.7)
RBC # BLD AUTO: 3.95 X10(6)UL (ref 3.8–5.3)
SODIUM SERPL-SCNC: 142 MMOL/L (ref 136–145)
WBC # BLD AUTO: 12.7 X10(3) UL (ref 4–11)

## 2019-07-31 PROCEDURE — 99232 SBSQ HOSP IP/OBS MODERATE 35: CPT | Performed by: INTERNAL MEDICINE

## 2019-07-31 RX ORDER — FUROSEMIDE 10 MG/ML
20 INJECTION INTRAMUSCULAR; INTRAVENOUS ONCE
Status: COMPLETED | OUTPATIENT
Start: 2019-07-31 | End: 2019-07-31

## 2019-07-31 RX ORDER — ONDANSETRON 2 MG/ML
4 INJECTION INTRAMUSCULAR; INTRAVENOUS EVERY 6 HOURS PRN
Status: DISCONTINUED | OUTPATIENT
Start: 2019-07-31 | End: 2019-08-02

## 2019-07-31 RX ORDER — POTASSIUM CHLORIDE 20 MEQ/1
40 TABLET, EXTENDED RELEASE ORAL EVERY 4 HOURS
Status: COMPLETED | OUTPATIENT
Start: 2019-07-31 | End: 2019-07-31

## 2019-07-31 NOTE — OCCUPATIONAL THERAPY NOTE
OT orders received via [facility] ADL screening; chart reviewed and discussed with RN. Pt transfers via kong at baseline. Pt has assist from caregiver for dressing, toileting and bathing at baseline. Has been feeding self during this admission.  No skilled ther

## 2019-07-31 NOTE — PLAN OF CARE
Pt is A&O x4 with slurred speech per baseline. 2 L O2 via nasal cannula. Lung sounds clear, diminished in bases bilaterally. States pain is only present when trying to manipulate left leg. Bilateral lower extremities edematous.  Swelling to left leg much gr tolerated  - Nasogastric tube to low intermittent suction as ordered  - Evaluate effectiveness of ordered antiemetic medications  - Provide nonpharmacologic comfort measures as appropriate  - Advance diet as tolerated, if ordered  - Obtain nutritional cons secretions as needed  - Reorient patient post seizure  - Seizure pads on all 4 side rails  - Instruct patient/family to notify RN of any seizure activity  - Instruct patient/family to call for assistance with activity based on assessment  Outcome: Progress

## 2019-07-31 NOTE — PLAN OF CARE
Problem: Patient/Family Goals  Goal: Patient/Family Long Term Goal  Description  Patient's Long Term Goal: Discharge home    Interventions:  - Improvement in s/sx of infection  - Free from fever > 24 hours   - Return to previous ADLs  - See additional Ca healing without S/S of infection  Description  INTERVENTIONS:  - Assess and document risk factors for pressure ulcer development  - Assess and document skin integrity  - Assess and document dressing/incision, wound bed, drain sites and surrounding tissue

## 2019-07-31 NOTE — CM/SW NOTE
Pt is a 63 yo female admitted for left leg cellulitis. Pt has cerebral palsy. Pt lives alone but has a mother and sister living nearby. Pt has a  thru the [facility] for 6 hrs/day. Pt is wheelchair bound and has a kong lift.   Pt is renate

## 2019-07-31 NOTE — PHYSICAL THERAPY NOTE
Orders received for PT evaluation via ADL screening functional mobility score. Chart reviewed.  Per PT note from 4/16/19, pt has caregiver support and has been dependent for all functional mobilities with use of kong lift and motorized wheelchair with tilt

## 2019-07-31 NOTE — PROGRESS NOTES
[facility] HOSPITALIST  Progress Note     Hunter Aguirre Patient Status:  Inpatient    1960 MRN [id number]   [facility] 3NW-A Attending Kory Morales MD   Hosp Day # 1 PCP Whitley Agustin     Chief Complaint: leg cellulitis     S: Patie last 168 hours. Imaging: Imaging data reviewed in Epic.     Medications:   • Acidophilus/Pectin  1 capsule Oral Daily   • Phenytoin Sodium Extended  300 mg Oral Daily   • Vitamin C  500 mg Oral Daily   • ceFAZolin  1 g Intravenous Q8H       ASSESSME

## 2019-08-01 LAB
INR BLD: 3.29 (ref 0.9–1.1)
PSA SERPL DL<=0.01 NG/ML-MCNC: 35.8 SECONDS (ref 12.5–14.7)

## 2019-08-01 PROCEDURE — 99232 SBSQ HOSP IP/OBS MODERATE 35: CPT | Performed by: INTERNAL MEDICINE

## 2019-08-01 NOTE — CONSULTS
[facility] Dosing Service  Warfarin (Coumadin) Initial Dosing    Marshal Rizzo is a 62year old female for whom pharmacy has been consulted to dose warfarin (COUMADIN) for DVT  by Dr. Fozia Sapp. Based on this indication, goal INR is 2-3.     Surgery Date (i

## 2019-08-01 NOTE — PROGRESS NOTES
[facility] HOSPITALIST  Progress Note     [facility] Patient Status:  Inpatient    1960 MRN [id number]   [facility] 3NW-A Attending Ham Celaya MD   Hosp Day # 2 PCP Delores Miller     Chief Complaint: cellulitis     S: Patient w on SCr of 0.67 mg/dL). Recent Labs   Lab 07/30/19  1520 07/31/19  0936 08/01/19  0526   PTP 34.4* 40.2* 35.8*   INR 3.13* 3.79* 3.29*       No results for input(s): TROP, CK in the last 168 hours. Imaging: Imaging data reviewed in Epic.     Medic

## 2019-08-01 NOTE — PLAN OF CARE
PT ON SEIZURE , ASPIRATION AND FALL PRECAUTIONS. PER RECENT SPEECH EVAL, PT IS NOW ON THIN LIQUIDS AND CHOPPED DIET, PT CAN USE A STRAW. POC UPDATED, PT VERBALIZED UNDERSTANDING. EDEMA TO LEFT LED NOTED, LEG IS RED AND WARM TO TOUCH. LEGS ELEVATED.  WILL C

## 2019-08-01 NOTE — PLAN OF CARE
Problem: GASTROINTESTINAL - ADULT  Goal: Minimal or absence of nausea and vomiting  Description  INTERVENTIONS:  - Maintain adequate hydration with IV or PO as ordered and tolerated  - Nasogastric tube to low intermittent suction as ordered  - Evaluate e ordered  - Monitor patient for seizure activity, document and report duration and description of seizure to MD/LIP  - If seizure occurs, turn patient to side and suction secretions as needed  - Reorient patient post seizure  - Seizure pads on all 4 side ra

## 2019-08-02 VITALS
BODY MASS INDEX: 31.44 KG/M2 | OXYGEN SATURATION: 96 % | HEIGHT: 60 IN | HEART RATE: 86 BPM | DIASTOLIC BLOOD PRESSURE: 86 MMHG | RESPIRATION RATE: 18 BRPM | TEMPERATURE: 97 F | SYSTOLIC BLOOD PRESSURE: 125 MMHG | WEIGHT: 160.13 LBS

## 2019-08-02 LAB
INR BLD: 2.05 (ref 0.9–1.1)
PSA SERPL DL<=0.01 NG/ML-MCNC: 24.4 SECONDS (ref 12.5–14.7)

## 2019-08-02 PROCEDURE — 99239 HOSP IP/OBS DSCHRG MGMT >30: CPT | Performed by: INTERNAL MEDICINE

## 2019-08-02 RX ORDER — FUROSEMIDE 20 MG/1
20 TABLET ORAL DAILY PRN
Qty: 30 TABLET | Refills: 0 | Status: SHIPPED | OUTPATIENT
Start: 2019-08-02 | End: 2019-10-23

## 2019-08-02 RX ORDER — CEPHALEXIN 500 MG/1
500 CAPSULE ORAL 4 TIMES DAILY
Qty: 28 CAPSULE | Refills: 0 | Status: SHIPPED | OUTPATIENT
Start: 2019-08-02 | End: 2019-08-09

## 2019-08-02 RX ORDER — WARFARIN SODIUM 2 MG/1
2 TABLET ORAL
Status: DISCONTINUED | OUTPATIENT
Start: 2019-08-02 | End: 2019-08-02

## 2019-08-02 RX ORDER — FUROSEMIDE 10 MG/ML
20 INJECTION INTRAMUSCULAR; INTRAVENOUS ONCE
Status: COMPLETED | OUTPATIENT
Start: 2019-08-02 | End: 2019-08-02

## 2019-08-02 NOTE — SLP NOTE
SPEECH DAILY NOTE - INPATIENT    ASSESSMENT & PLAN   ASSESSMENT  Pt seen for dysphagia tx to assess tolerance with recommended diet, ensure proper utilization of aspiration precautions and provide pt/family education. RN approved visit.  Patient in bed with Bob Pitts

## 2019-08-02 NOTE — CM/SW NOTE
AVS sent to home health agency prior to discharge.       08/02/19 1555   Discharge disposition   Expected discharge disposition Home-Health   Name of Facillity/Home Care/Hospice   ([facility]   Discharge transporta

## 2019-08-02 NOTE — CM/SW NOTE
Pt is ready for d/c today. Pt will return home with [facility]. Called Silva Pate at [facility] [phone] to notify her of pt's d/c today - they will resume [facility] with her.   Called pt's caregiver Deirdre Cohen, as requested by pt's mother, to let her know

## 2019-08-02 NOTE — DISCHARGE SUMMARY
[facility] HOSPITALIST  DISCHARGE SUMMARY     Mireya Dinh Patient Status:  Inpatient    1960 MRN [id number]   [facility] 3NW-A Attending No att. providers found   Hosp Day # 3 PCP Gracie Bocanegra     Date of Admission: 2019  Date Tabs  Commonly known as:  LASIX      Take 1 tablet (20 mg total) by mouth daily as needed (leg edema/ weight gain). You may take once daily lasix if noted to have increase edema on your legs.    Quantity:  30 tablet  Refills:  0        CONTINUE taking these General: No acute distress. Respiratory: Clear to auscultation bilaterally. No wheezes. No rhonchi. Cardiovascular: S1, S2. Regular rate and rhythm. No murmurs, rubs or gallops. Abdomen: Soft, nontender, nondistended.     Musculoskeletal: Moves all e

## 2019-08-02 NOTE — PROGRESS NOTES
Pt seen and cleared for discharge. Dicussed with her mother over the phone. DC summary to follow.      Heidy Jj MD

## 2019-08-02 NOTE — PROGRESS NOTES
Pt d/c home. D/c instructions given to ambulance drivers, including rx's for lasix & cephalexin, wound care & f/u care. Report given to ambulance drivers along w/ face sheet. Left unit stable via cart.

## 2019-08-02 NOTE — PLAN OF CARE
Assumed care at [address] [facility] pt on bed, room air. Vital signs stable, afebrile. IV antibiotic.   Labs in am    Problem: Patient/Family Goals  Goal: Patient/Family Long Term Goal  Description  Patient's Long Term Goal: Discharge home    Interventions:  - Imp algorithm/standards of care as needed  Outcome: Progressing  Goal: Incision(s), wounds(s) or drain site(s) healing without S/S of infection  Description  INTERVENTIONS:  - Assess and document risk factors for pressure ulcer development  - Assess and docume

## 2019-08-02 NOTE — PLAN OF CARE
Pt states she is ready for discharge  Dr Carrie Jonas here & new orders noted for discharge. Md states she spoke with pt's mother, who request staff to contact care giver. Md also states that, per pt's mother, pt will need ambulance for discharge.   Spoke with

## 2019-08-02 NOTE — CONSULTS
Pharmacy Dosing Service: Warfarin (Coumadin)  Marshell Severs is a 62year old female for whom pharmacy has been dosing warfarin (Coumadin).  Goal INR is 2-3    Recent Labs   Lab 07/30/19 07/30/19  1520 07/31/19  0936 08/01/19  0526 08/02/19  0550   INR 4.3

## 2019-08-05 ENCOUNTER — ANTI-COAG VISIT (OUTPATIENT)
Dept: HEMATOLOGY/ONCOLOGY | Facility: HOSPITAL | Age: 59
End: 2019-08-05

## 2019-08-05 DIAGNOSIS — I82.412 ACUTE DEEP VEIN THROMBOSIS (DVT) OF FEMORAL VEIN OF LEFT LOWER EXTREMITY (HCC): ICD-10-CM

## 2019-08-05 LAB — INR: 1.4 (ref 0.8–1.2)

## 2019-08-09 ENCOUNTER — ANTI-COAG VISIT (OUTPATIENT)
Dept: HEMATOLOGY/ONCOLOGY | Facility: HOSPITAL | Age: 59
End: 2019-08-09

## 2019-08-09 DIAGNOSIS — I82.412 ACUTE DEEP VEIN THROMBOSIS (DVT) OF FEMORAL VEIN OF LEFT LOWER EXTREMITY (HCC): ICD-10-CM

## 2019-08-09 LAB — INR: 2.2 (ref 0.8–1.2)

## 2019-08-14 ENCOUNTER — ANTI-COAG VISIT (OUTPATIENT)
Dept: HEMATOLOGY/ONCOLOGY | Facility: HOSPITAL | Age: 59
End: 2019-08-14

## 2019-08-14 DIAGNOSIS — I82.412 ACUTE DEEP VEIN THROMBOSIS (DVT) OF FEMORAL VEIN OF LEFT LOWER EXTREMITY (HCC): ICD-10-CM

## 2019-08-14 LAB — INR: 2.7 (ref 0.8–1.2)

## 2019-08-21 ENCOUNTER — ANTI-COAG VISIT (OUTPATIENT)
Dept: HEMATOLOGY/ONCOLOGY | Facility: HOSPITAL | Age: 59
End: 2019-08-21

## 2019-08-21 DIAGNOSIS — I82.412 ACUTE DEEP VEIN THROMBOSIS (DVT) OF FEMORAL VEIN OF LEFT LOWER EXTREMITY (HCC): ICD-10-CM

## 2019-08-21 LAB — INR: 3.8 (ref 0.8–1.2)

## 2019-08-21 NOTE — CDS QUERY
History of Cerebral [place]  Dear Dr Kolton Martin   Clinical information (provided below) indicates a history of cerebral palsy.  For accurate ICD-10-CM code assignment to reflect severity of illness and risk of mortality

## 2019-08-28 ENCOUNTER — ANTI-COAG VISIT (OUTPATIENT)
Dept: HEMATOLOGY/ONCOLOGY | Facility: HOSPITAL | Age: 59
End: 2019-08-28

## 2019-08-28 DIAGNOSIS — I82.412 ACUTE DEEP VEIN THROMBOSIS (DVT) OF FEMORAL VEIN OF LEFT LOWER EXTREMITY (HCC): ICD-10-CM

## 2019-08-28 LAB — INR: 3.7 (ref 0.8–1.2)

## 2019-08-30 ENCOUNTER — HOSPITAL ENCOUNTER (INPATIENT)
Facility: HOSPITAL | Age: 59
LOS: 2 days | Discharge: HOME HEALTH | DRG: 602 | End: 2019-09-01
Attending: EMERGENCY MEDICINE | Admitting: HOSPITALIST
Payer: MEDICARE

## 2019-08-30 ENCOUNTER — APPOINTMENT (OUTPATIENT)
Dept: GENERAL RADIOLOGY | Facility: HOSPITAL | Age: 59
DRG: 602 | End: 2019-08-30
Attending: EMERGENCY MEDICINE
Payer: MEDICARE

## 2019-08-30 DIAGNOSIS — L03.116 CELLULITIS OF LEFT LOWER EXTREMITY: Primary | ICD-10-CM

## 2019-08-30 LAB
ALBUMIN SERPL-MCNC: 3.2 G/DL (ref 3.4–5)
ALBUMIN/GLOB SERPL: 0.7 {RATIO} (ref 1–2)
ALP LIVER SERPL-CCNC: 70 U/L (ref 46–118)
ALT SERPL-CCNC: 17 U/L (ref 13–56)
ANION GAP SERPL CALC-SCNC: 7 MMOL/L (ref 0–18)
AST SERPL-CCNC: 13 U/L (ref 15–37)
BASOPHILS # BLD AUTO: 0.03 X10(3) UL (ref 0–0.2)
BASOPHILS NFR BLD AUTO: 0.2 %
BILIRUB SERPL-MCNC: 0.4 MG/DL (ref 0.1–2)
BILIRUB UR QL STRIP.AUTO: NEGATIVE
BUN BLD-MCNC: 14 MG/DL (ref 7–18)
BUN/CREAT SERPL: 21.9 (ref 10–20)
CALCIUM BLD-MCNC: 8.8 MG/DL (ref 8.5–10.1)
CHLORIDE SERPL-SCNC: 105 MMOL/L (ref 98–112)
CLARITY UR REFRACT.AUTO: CLEAR
CO2 SERPL-SCNC: 26 MMOL/L (ref 21–32)
COLOR UR AUTO: YELLOW
CREAT BLD-MCNC: 0.64 MG/DL (ref 0.55–1.02)
DEPRECATED RDW RBC AUTO: 44.8 FL (ref 35.1–46.3)
EOSINOPHIL # BLD AUTO: 0 X10(3) UL (ref 0–0.7)
EOSINOPHIL NFR BLD AUTO: 0 %
ERYTHROCYTE [DISTWIDTH] IN BLOOD BY AUTOMATED COUNT: 13.8 % (ref 11–15)
GLOBULIN PLAS-MCNC: 4.4 G/DL (ref 2.8–4.4)
GLUCOSE BLD-MCNC: 107 MG/DL (ref 70–99)
GLUCOSE UR STRIP.AUTO-MCNC: NEGATIVE MG/DL
HCT VFR BLD AUTO: 37.8 % (ref 35–48)
HGB BLD-MCNC: 12.3 G/DL (ref 12–16)
IMM GRANULOCYTES # BLD AUTO: 0.12 X10(3) UL (ref 0–1)
IMM GRANULOCYTES NFR BLD: 0.7 %
INR BLD: 3.23 (ref 0.9–1.1)
KETONES UR STRIP.AUTO-MCNC: NEGATIVE MG/DL
LACTATE SERPL-SCNC: 1.1 MMOL/L (ref 0.4–2)
LEUKOCYTE ESTERASE UR QL STRIP.AUTO: NEGATIVE
LYMPHOCYTES # BLD AUTO: 0.66 X10(3) UL (ref 1–4)
LYMPHOCYTES NFR BLD AUTO: 4.1 %
M PROTEIN MFR SERPL ELPH: 7.6 G/DL (ref 6.4–8.2)
MCH RBC QN AUTO: 28.9 PG (ref 26–34)
MCHC RBC AUTO-ENTMCNC: 32.5 G/DL (ref 31–37)
MCV RBC AUTO: 88.7 FL (ref 80–100)
MONOCYTES # BLD AUTO: 0.52 X10(3) UL (ref 0.1–1)
MONOCYTES NFR BLD AUTO: 3.2 %
NEUTROPHILS # BLD AUTO: 14.92 X10 (3) UL (ref 1.5–7.7)
NEUTROPHILS # BLD AUTO: 14.92 X10(3) UL (ref 1.5–7.7)
NEUTROPHILS NFR BLD AUTO: 91.8 %
NITRITE UR QL STRIP.AUTO: NEGATIVE
OSMOLALITY SERPL CALC.SUM OF ELEC: 287 MOSM/KG (ref 275–295)
PH UR STRIP.AUTO: 6 [PH] (ref 4.5–8)
PLATELET # BLD AUTO: 114 10(3)UL (ref 150–450)
POTASSIUM SERPL-SCNC: 3.9 MMOL/L (ref 3.5–5.1)
PROT UR STRIP.AUTO-MCNC: NEGATIVE MG/DL
PSA SERPL DL<=0.01 NG/ML-MCNC: 35.3 SECONDS (ref 12.5–14.7)
RBC # BLD AUTO: 4.26 X10(6)UL (ref 3.8–5.3)
RBC UR QL AUTO: NEGATIVE
SODIUM SERPL-SCNC: 138 MMOL/L (ref 136–145)
SP GR UR STRIP.AUTO: 1.02 (ref 1–1.03)
UROBILINOGEN UR STRIP.AUTO-MCNC: <2 MG/DL
WBC # BLD AUTO: 16.3 X10(3) UL (ref 4–11)

## 2019-08-30 PROCEDURE — 99223 1ST HOSP IP/OBS HIGH 75: CPT | Performed by: HOSPITALIST

## 2019-08-30 PROCEDURE — 71045 X-RAY EXAM CHEST 1 VIEW: CPT | Performed by: EMERGENCY MEDICINE

## 2019-08-30 RX ORDER — PHENYTOIN SODIUM 100 MG/1
300 CAPSULE, EXTENDED RELEASE ORAL DAILY
Status: DISCONTINUED | OUTPATIENT
Start: 2019-08-31 | End: 2019-09-01

## 2019-08-30 RX ORDER — ACETAMINOPHEN 325 MG/1
650 TABLET ORAL EVERY 6 HOURS PRN
Status: DISCONTINUED | OUTPATIENT
Start: 2019-08-30 | End: 2019-09-01

## 2019-08-30 RX ORDER — GARLIC EXTRACT 500 MG
1 CAPSULE ORAL DAILY
Status: DISCONTINUED | OUTPATIENT
Start: 2019-08-31 | End: 2019-09-01

## 2019-08-30 RX ORDER — CEFAZOLIN SODIUM/WATER 2 G/20 ML
2 SYRINGE (ML) INTRAVENOUS EVERY 6 HOURS
Status: DISCONTINUED | OUTPATIENT
Start: 2019-08-30 | End: 2019-08-31

## 2019-08-30 RX ORDER — ONDANSETRON 2 MG/ML
4 INJECTION INTRAMUSCULAR; INTRAVENOUS EVERY 6 HOURS PRN
Status: DISCONTINUED | OUTPATIENT
Start: 2019-08-30 | End: 2019-09-01

## 2019-08-30 RX ORDER — ACETAMINOPHEN 500 MG
1000 TABLET ORAL ONCE
Status: COMPLETED | OUTPATIENT
Start: 2019-08-30 | End: 2019-08-30

## 2019-08-30 NOTE — PROGRESS NOTES
[facility] Pharmacy Note: Antimicrobial Weight Dose Adjustment for: vancomycin (Jim Segura)    Oseas Vaughn is a 62year old female who has been prescribed vancomycin (VANCOCIN) 1000 mg x1 dose.   CrCl is CrCl cannot be calculated (Patient's most recent lab resul

## 2019-08-30 NOTE — ED INITIAL ASSESSMENT (HPI)
Patient reports history of bilateral cellulitis of her lower legs and is being treated with keflex. Patient started with a  Fever today. Reports improved since been taking Rx. Reported a fever 108 by RN at assisted living center earlier today.      Ambulanc

## 2019-08-30 NOTE — H&P
[facility] HOSPITALIST  History and Physical     Estevan Singleton Patient Status:  Inpatient    1960 MRN [id number]   [facility] 4NW-A Attending Nitin Pena [address] Day # 0 PCP Ti Smith     Chief Complaint: Left lower Encounter:  furosemide 20 MG Oral Tab Take 1 tablet (20 mg total) by mouth daily as needed (leg edema/ weight gain). You may take once daily lasix if noted to have increase edema on your legs.  Disp: 30 tablet Rfl: 0   Warfarin Sodium 5 MG Oral Tab Take 5 m Labs   Lab 08/28/19 08/30/19  1353   WBC  --  16.3*   HGB  --  12.3   MCV  --  88.7   PLT  --  114.0*   INR 3.7* 3.23*       Recent Labs   Lab 08/30/19  1353   *   BUN 14   CREATSERUM 0.64   GFRAA 114   GFRNAA 99   CA 8.8   ALB 3.2*      K 3.9

## 2019-08-30 NOTE — PLAN OF CARE
NURSING ADMISSION NOTE      Patient admitted via Cart  Oriented to room. Safety precautions initiated. Bed in low position. Call light in reach. Admission navigator completed. Dr. Harrison Castaneda at the bedside. Dx LLE cellulitis. Afebrile.    A&Ox

## 2019-08-30 NOTE — ED PROVIDER NOTES
Patient Seen in: [facility] Emergency Department    History   Patient presents with:  Fever (infectious)  Cellulitis (integumentary, infectious)    Stated Complaint: fever.  cellulitis    HPI    CHIEF COMPLAINT: Left lower extremity swelling, redness • Seizure disorder [facility])               Past Surgical History:   Procedure Laterality Date   • OTHER      Patient states she had surgery as a child for her CP. Unsure of what it was.                     Social History    Tobacco Use      Smoking status: [employer] Left leg: Left lower extremity is swollen, edematous, hot to touch from the mid shin to the foot and into the digits. No sloughing of skin, no palpable abscesses. There is cellulitic changes of the skin and noted without dimpling or weeping.   No pu Patient IV tablet and septic protocol was initiated. Patient will receive 30 cc/kg of saline, lactate, CBC, CMP and septic work-up. Patient also received vancomycin for her infection.   Patient  laboratory work-up revealed a relatively unremarkable CMP wi

## 2019-08-31 LAB
ANION GAP SERPL CALC-SCNC: 7 MMOL/L (ref 0–18)
BASOPHILS # BLD AUTO: 0.03 X10(3) UL (ref 0–0.2)
BASOPHILS NFR BLD AUTO: 0.3 %
BUN BLD-MCNC: 11 MG/DL (ref 7–18)
BUN/CREAT SERPL: 26.2 (ref 10–20)
CALCIUM BLD-MCNC: 8.3 MG/DL (ref 8.5–10.1)
CHLORIDE SERPL-SCNC: 109 MMOL/L (ref 98–112)
CO2 SERPL-SCNC: 24 MMOL/L (ref 21–32)
CREAT BLD-MCNC: 0.42 MG/DL (ref 0.55–1.02)
DEPRECATED RDW RBC AUTO: 44 FL (ref 35.1–46.3)
EOSINOPHIL # BLD AUTO: 0.03 X10(3) UL (ref 0–0.7)
EOSINOPHIL NFR BLD AUTO: 0.3 %
ERYTHROCYTE [DISTWIDTH] IN BLOOD BY AUTOMATED COUNT: 13.5 % (ref 11–15)
GLUCOSE BLD-MCNC: 81 MG/DL (ref 70–99)
HCT VFR BLD AUTO: 35.3 % (ref 35–48)
HGB BLD-MCNC: 11.6 G/DL (ref 12–16)
IMM GRANULOCYTES # BLD AUTO: 0.04 X10(3) UL (ref 0–1)
IMM GRANULOCYTES NFR BLD: 0.4 %
INR BLD: 3.23 (ref 0.9–1.1)
LYMPHOCYTES # BLD AUTO: 0.87 X10(3) UL (ref 1–4)
LYMPHOCYTES NFR BLD AUTO: 8.2 %
MCH RBC QN AUTO: 29.1 PG (ref 26–34)
MCHC RBC AUTO-ENTMCNC: 32.9 G/DL (ref 31–37)
MCV RBC AUTO: 88.7 FL (ref 80–100)
MONOCYTES # BLD AUTO: 0.56 X10(3) UL (ref 0.1–1)
MONOCYTES NFR BLD AUTO: 5.2 %
NEUTROPHILS # BLD AUTO: 9.14 X10 (3) UL (ref 1.5–7.7)
NEUTROPHILS # BLD AUTO: 9.14 X10(3) UL (ref 1.5–7.7)
NEUTROPHILS NFR BLD AUTO: 85.6 %
OSMOLALITY SERPL CALC.SUM OF ELEC: 288 MOSM/KG (ref 275–295)
PLATELET # BLD AUTO: 112 10(3)UL (ref 150–450)
POTASSIUM SERPL-SCNC: 3.3 MMOL/L (ref 3.5–5.1)
POTASSIUM SERPL-SCNC: 4.5 MMOL/L (ref 3.5–5.1)
PSA SERPL DL<=0.01 NG/ML-MCNC: 35.3 SECONDS (ref 12.5–14.7)
RBC # BLD AUTO: 3.98 X10(6)UL (ref 3.8–5.3)
SODIUM SERPL-SCNC: 140 MMOL/L (ref 136–145)
WBC # BLD AUTO: 10.7 X10(3) UL (ref 4–11)

## 2019-08-31 PROCEDURE — 99232 SBSQ HOSP IP/OBS MODERATE 35: CPT | Performed by: HOSPITALIST

## 2019-08-31 RX ORDER — CEFAZOLIN SODIUM/WATER 2 G/20 ML
2 SYRINGE (ML) INTRAVENOUS EVERY 8 HOURS
Status: DISCONTINUED | OUTPATIENT
Start: 2019-08-31 | End: 2019-09-01

## 2019-08-31 RX ORDER — POTASSIUM CHLORIDE 1.5 G/1.77G
40 POWDER, FOR SOLUTION ORAL EVERY 4 HOURS
Status: COMPLETED | OUTPATIENT
Start: 2019-08-31 | End: 2019-08-31

## 2019-08-31 RX ORDER — PENICILLIN V POTASSIUM 500 MG/1
TABLET ORAL
Qty: 60 TABLET | Refills: 11 | Status: ON HOLD | OUTPATIENT
Start: 2019-08-31 | End: 2019-10-28

## 2019-08-31 NOTE — CONSULTS
[facility] Dosing Service    Initial Pharmacokinetic Consult for Vancomycin Dosing     Sulaiman Sewell is a 62year old female who is being treated for cellulitis. Pharmacy has been asked to dose Vancomycin by Dr. Pily Marquis.     She is allergic to sulfa a

## 2019-08-31 NOTE — PROGRESS NOTES
[facility]PITAL[facility]  Progress Note     Tushar Williamson Patient Status:  Inpatient    1960 MRN [id number]   [facility] 4NW-A Attending Ehsan Carlton MD   Hosp Day # 1 PCP [place] [facility]     Chief Complaint: cellulitis    S: Patient er Acidophilus/Pectin  1 capsule Oral Daily   • Phenytoin Sodium Extended  300 mg Oral Daily       ASSESSMENT / PLAN:     1. Left lower extremity cellulitis-recurrent  2. Borderline Sirs/septic but does not appear septic to me  3.  Bilateral lower extremity ly

## 2019-08-31 NOTE — PROGRESS NOTES
Afebrile, initial dose of ancef given, left leg is red and swollen.  Speech is garbled (baseline) but oriented x3, bedbound,  Home dose of coumadin verified with the caregiver, pharmacy notified that patient was taking 5 mg daily, dose is on hold tonight re

## 2019-08-31 NOTE — PLAN OF CARE
Patient denies pain to left lower extremity, swollen and red, no irritation noted to right lower extremity. Patient on seizure precautions, patient tolerating potassium replacermant.

## 2019-08-31 NOTE — PROGRESS NOTES
Afebrile. Denies pain. Had  bm overnite. Legs elevated. Alert and orientated x4. Vital signs stable.

## 2019-08-31 NOTE — CONSULTS
[facility] Dosing Service  Warfarin (Coumadin) Initial Dosing    Giuliana Perez is a 62year old female for whom pharmacy has been consulted to dose warfarin (COUMADIN) for hx PE/DVT  by Dr. Miah Jin. Based on this indication, goal INR is 2-3.     Shalom

## 2019-08-31 NOTE — CONSULTS
INFECTIOUS DISEASE CONSULTATION    Bruce Olszewski Patient Status:  Inpatient    1960 MRN [id number]   [facility] 4NW-A Attending Toribio Garrido MD   [facility] Day # 1 PCP [facility] (ANCEF/KEFZOL) 2 GM/20ML premix IV syringe 2 g, 2 g, Intravenous, Q6H    No current facility-administered medications on file prior to encounter.    Current Outpatient Medications on File Prior to Encounter:  furosemide 20 MG Oral Tab Take 1 tablet (20 mg t 08/30/19  1353 08/31/19  0615   * 81   BUN 14 11   CREATSERUM 0.64 0.42*   GFRAA 114 131   GFRNAA 99 113   CA 8.8 8.3*   ALB 3.2*  --     140   K 3.9 3.3*    109   CO2 26.0 24.0   ALKPHO 70  --    AST 13*  --    ALT 17  --    BILT 0.4  - Hypokalemia     Acute deep vein thrombosis (DVT) of femoral vein of left lower extremity (HCC)     Warfarin anticoagulation     Community acquired pneumonia of right upper lobe of lung ([address]     Sepsis, due to unspecified organism      ASSESSMENT/PLAN:  1.

## 2019-08-31 NOTE — CONSULTS
Pharmacy Dosing Service: Warfarin (Coumadin)    Montrell Alvarez is a 62year old female for whom pharmacy has been dosing warfarin (Coumadin).  Goal INR is 2-3    Recent Labs   Lab 08/28/19 08/30/19  1353 08/31/19  0615   INR 3.7* 3.23* 3.23*     Potential

## 2019-08-31 NOTE — PLAN OF CARE
Dr Tiffanie Hurst notified of positive blood cultures from emergency room yesterday, gram positive cocci in clusters pcr negative, no new orders obtained.

## 2019-09-01 VITALS
WEIGHT: 149.75 LBS | DIASTOLIC BLOOD PRESSURE: 82 MMHG | SYSTOLIC BLOOD PRESSURE: 143 MMHG | RESPIRATION RATE: 20 BRPM | OXYGEN SATURATION: 97 % | TEMPERATURE: 98 F | BODY MASS INDEX: 29 KG/M2 | HEART RATE: 90 BPM

## 2019-09-01 LAB
INR BLD: 2.25 (ref 0.9–1.1)
PSA SERPL DL<=0.01 NG/ML-MCNC: 26.3 SECONDS (ref 12.5–14.7)

## 2019-09-01 PROCEDURE — 99239 HOSP IP/OBS DSCHRG MGMT >30: CPT | Performed by: HOSPITALIST

## 2019-09-01 RX ORDER — WARFARIN SODIUM 2.5 MG/1
2.5 TABLET ORAL
Status: COMPLETED | OUTPATIENT
Start: 2019-09-01 | End: 2019-09-01

## 2019-09-01 NOTE — CM/SW NOTE
SW [facility] transport to [facility]. Social work to remain available for support or any discharge planning needs.     Navin Clark MSW, LCSW   at [facility]  Ph: [phone] or Nataly Sidhu Se

## 2019-09-01 NOTE — PLAN OF CARE
Problem: METABOLIC/FLUID AND ELECTROLYTES - ADULT  Goal: Electrolytes maintained within normal limits  Description  INTERVENTIONS:  - Monitor labs and rhythm and assess patient for signs and symptoms of electrolyte imbalances  - Administer electrolyte re tolerance for standing, transferring and ambulating w/ or w/o assistive devices  - Assist with transfers and ambulation using safe patient handling equipment as needed  - Ensure adequate protection for wounds/incisions during mobilization  - Obtain PT/OT c

## 2019-09-01 NOTE — PROGRESS NOTES
[facility]                INFECTIOUS DISEASE PROGRESS NOTE    Jason Patel Patient Status:  Inpatient    1960 MRN [id number]   [facility] 4NW-A Attending Blade San MD   [facility] Day # 2 PCP [facility]     Antibiotic Contusion of leg     Ulcer of leg, chronic, left (HCC)     Hyperglycemia     Azotemia     Cellulitis of left lower extremity     Contusion of leg, left, initial encounter     Gastroesophageal reflux disease     Dehydration     Nausea and vomiting     [facility]  [phone]

## 2019-09-01 NOTE — CONSULTS
Pharmacy Dosing Service: Warfarin (Coumadin)  Fransisca Frye is a 62year old female for whom pharmacy has been dosing warfarin (Coumadin).  Goal INR is 2-3    Recent Labs   Lab 08/28/19 08/30/19  1353 08/31/19  0615 09/01/19  0622   INR 3.7* 3.23* 3.23* 2

## 2019-09-02 NOTE — DISCHARGE SUMMARY
[facility] HOSPITALIST  DISCHARGE SUMMARY     Fransisca Frye Patient Status:  Inpatient    1960 MRN [id number]   [facility] 4NW-A Attending No att. providers found   Hosp Day # 2 PCP Karol Pinedo     Date of Admission: 2019  Date extremity cellulitis just about once a month. Brief Synopsis: started on IV abx for her recurrent cellulitis. Borderline SIRS/sepsis but did not appear septic. Had been on ppx keflex at home. Consulted ID.  Swelling and erythema improved significantly w/ doctor. Original instructions: Take 5 mg by mouth 3 (three) times a week. Monday, Wednesday, Friday   Refills:  0     Warfarin Sodium 6 MG Tabs  Commonly known as:  COUMADIN      Take as directed.  If you are unsure how to take this medication, talk to yo

## 2019-09-03 ENCOUNTER — ANTI-COAG VISIT (OUTPATIENT)
Dept: HEMATOLOGY/ONCOLOGY | Facility: HOSPITAL | Age: 59
End: 2019-09-03

## 2019-09-03 DIAGNOSIS — I82.412 ACUTE DEEP VEIN THROMBOSIS (DVT) OF FEMORAL VEIN OF LEFT LOWER EXTREMITY (HCC): ICD-10-CM

## 2019-09-03 LAB — INR: 1.5 (ref 0.8–1.2)

## 2019-09-05 ENCOUNTER — ANTI-COAG VISIT (OUTPATIENT)
Dept: HEMATOLOGY/ONCOLOGY | Facility: HOSPITAL | Age: 59
End: 2019-09-05

## 2019-09-05 DIAGNOSIS — I82.412 ACUTE DEEP VEIN THROMBOSIS (DVT) OF FEMORAL VEIN OF LEFT LOWER EXTREMITY (HCC): ICD-10-CM

## 2019-09-05 LAB — INR: 1.8 (ref 0.8–1.2)

## 2019-09-11 ENCOUNTER — ANTI-COAG VISIT (OUTPATIENT)
Dept: HEMATOLOGY/ONCOLOGY | Facility: HOSPITAL | Age: 59
End: 2019-09-11

## 2019-09-11 DIAGNOSIS — I82.412 ACUTE DEEP VEIN THROMBOSIS (DVT) OF FEMORAL VEIN OF LEFT LOWER EXTREMITY (HCC): ICD-10-CM

## 2019-09-11 LAB — INR: 3.4 (ref 0.8–1.2)

## 2019-09-19 ENCOUNTER — ANTI-COAG VISIT (OUTPATIENT)
Dept: HEMATOLOGY/ONCOLOGY | Facility: HOSPITAL | Age: 59
End: 2019-09-19

## 2019-09-19 DIAGNOSIS — I82.412 ACUTE DEEP VEIN THROMBOSIS (DVT) OF FEMORAL VEIN OF LEFT LOWER EXTREMITY (HCC): ICD-10-CM

## 2019-09-19 LAB — INR: 3.2 (ref 0.8–1.2)

## 2019-09-26 ENCOUNTER — ANTI-COAG VISIT (OUTPATIENT)
Dept: HEMATOLOGY/ONCOLOGY | Facility: HOSPITAL | Age: 59
End: 2019-09-26

## 2019-09-26 DIAGNOSIS — I82.412 ACUTE DEEP VEIN THROMBOSIS (DVT) OF FEMORAL VEIN OF LEFT LOWER EXTREMITY (HCC): ICD-10-CM

## 2019-09-26 LAB — INR: 4 (ref 0.8–1.2)

## 2019-10-01 ENCOUNTER — ANTI-COAG VISIT (OUTPATIENT)
Dept: HEMATOLOGY/ONCOLOGY | Facility: HOSPITAL | Age: 59
End: 2019-10-01

## 2019-10-01 DIAGNOSIS — I82.412 ACUTE DEEP VEIN THROMBOSIS (DVT) OF FEMORAL VEIN OF LEFT LOWER EXTREMITY (HCC): ICD-10-CM

## 2019-10-08 ENCOUNTER — ANTI-COAG VISIT (OUTPATIENT)
Dept: HEMATOLOGY/ONCOLOGY | Facility: HOSPITAL | Age: 59
End: 2019-10-08

## 2019-10-08 DIAGNOSIS — I82.412 ACUTE DEEP VEIN THROMBOSIS (DVT) OF FEMORAL VEIN OF LEFT LOWER EXTREMITY (HCC): ICD-10-CM

## 2019-10-15 ENCOUNTER — ANTI-COAG VISIT (OUTPATIENT)
Dept: HEMATOLOGY/ONCOLOGY | Facility: HOSPITAL | Age: 59
End: 2019-10-15

## 2019-10-15 DIAGNOSIS — I82.412 ACUTE DEEP VEIN THROMBOSIS (DVT) OF FEMORAL VEIN OF LEFT LOWER EXTREMITY (HCC): ICD-10-CM

## 2019-10-22 ENCOUNTER — ANTI-COAG VISIT (OUTPATIENT)
Dept: HEMATOLOGY/ONCOLOGY | Facility: HOSPITAL | Age: 59
End: 2019-10-22

## 2019-10-22 DIAGNOSIS — I82.412 ACUTE DEEP VEIN THROMBOSIS (DVT) OF FEMORAL VEIN OF LEFT LOWER EXTREMITY (HCC): ICD-10-CM

## 2019-10-23 ENCOUNTER — APPOINTMENT (OUTPATIENT)
Dept: ULTRASOUND IMAGING | Facility: HOSPITAL | Age: 59
DRG: 602 | End: 2019-10-23
Attending: EMERGENCY MEDICINE
Payer: MEDICARE

## 2019-10-23 ENCOUNTER — APPOINTMENT (OUTPATIENT)
Dept: GENERAL RADIOLOGY | Facility: HOSPITAL | Age: 59
DRG: 602 | End: 2019-10-23
Attending: EMERGENCY MEDICINE
Payer: MEDICARE

## 2019-10-23 ENCOUNTER — HOSPITAL ENCOUNTER (INPATIENT)
Facility: HOSPITAL | Age: 59
LOS: 5 days | Discharge: HOME HEALTH | DRG: 602 | End: 2019-10-28
Attending: EMERGENCY MEDICINE | Admitting: HOSPITALIST
Payer: MEDICARE

## 2019-10-23 DIAGNOSIS — L03.116 CELLULITIS OF LEFT LOWER EXTREMITY: Primary | ICD-10-CM

## 2019-10-23 DIAGNOSIS — I82.5Y1 CHRONIC DEEP VEIN THROMBOSIS (DVT) OF PROXIMAL VEIN OF RIGHT LOWER EXTREMITY (HCC): ICD-10-CM

## 2019-10-23 PROCEDURE — 71045 X-RAY EXAM CHEST 1 VIEW: CPT | Performed by: EMERGENCY MEDICINE

## 2019-10-23 PROCEDURE — 93970 EXTREMITY STUDY: CPT | Performed by: EMERGENCY MEDICINE

## 2019-10-23 PROCEDURE — 99223 1ST HOSP IP/OBS HIGH 75: CPT | Performed by: HOSPITALIST

## 2019-10-23 RX ORDER — SODIUM CHLORIDE 9 MG/ML
INJECTION, SOLUTION INTRAVENOUS CONTINUOUS
Status: DISCONTINUED | OUTPATIENT
Start: 2019-10-23 | End: 2019-10-25

## 2019-10-23 RX ORDER — IPRATROPIUM BROMIDE AND ALBUTEROL SULFATE 2.5; .5 MG/3ML; MG/3ML
3 SOLUTION RESPIRATORY (INHALATION) ONCE
Status: COMPLETED | OUTPATIENT
Start: 2019-10-23 | End: 2019-10-23

## 2019-10-23 RX ORDER — ACETAMINOPHEN 650 MG/1
650 SUPPOSITORY RECTAL EVERY 6 HOURS PRN
Status: DISCONTINUED | OUTPATIENT
Start: 2019-10-23 | End: 2019-10-28

## 2019-10-23 RX ORDER — ONDANSETRON 2 MG/ML
4 INJECTION INTRAMUSCULAR; INTRAVENOUS EVERY 6 HOURS PRN
Status: DISCONTINUED | OUTPATIENT
Start: 2019-10-23 | End: 2019-10-28

## 2019-10-23 RX ORDER — ONDANSETRON 2 MG/ML
4 INJECTION INTRAMUSCULAR; INTRAVENOUS EVERY 4 HOURS PRN
Status: DISCONTINUED | OUTPATIENT
Start: 2019-10-23 | End: 2019-10-23

## 2019-10-23 RX ORDER — ONDANSETRON 2 MG/ML
4 INJECTION INTRAMUSCULAR; INTRAVENOUS ONCE
Status: COMPLETED | OUTPATIENT
Start: 2019-10-23 | End: 2019-10-23

## 2019-10-23 RX ORDER — SODIUM CHLORIDE 9 MG/ML
1000 INJECTION, SOLUTION INTRAVENOUS ONCE
Status: DISCONTINUED | OUTPATIENT
Start: 2019-10-23 | End: 2019-10-23

## 2019-10-23 RX ORDER — WARFARIN SODIUM 1 MG/1
1 TABLET ORAL SEE ADMIN INSTRUCTIONS
COMMUNITY
End: 2020-01-10

## 2019-10-23 RX ORDER — SODIUM CHLORIDE 9 MG/ML
INJECTION, SOLUTION INTRAVENOUS CONTINUOUS
Status: ACTIVE | OUTPATIENT
Start: 2019-10-23 | End: 2019-10-23

## 2019-10-23 RX ORDER — ONDANSETRON 2 MG/ML
INJECTION INTRAMUSCULAR; INTRAVENOUS
Status: DISPENSED
Start: 2019-10-23 | End: 2019-10-24

## 2019-10-23 RX ORDER — ONDANSETRON 2 MG/ML
INJECTION INTRAMUSCULAR; INTRAVENOUS
Status: COMPLETED
Start: 2019-10-23 | End: 2019-10-23

## 2019-10-23 RX ORDER — METOCLOPRAMIDE HYDROCHLORIDE 5 MG/ML
10 INJECTION INTRAMUSCULAR; INTRAVENOUS EVERY 8 HOURS PRN
Status: DISCONTINUED | OUTPATIENT
Start: 2019-10-23 | End: 2019-10-28

## 2019-10-23 RX ORDER — ACETAMINOPHEN 325 MG/1
650 TABLET ORAL EVERY 6 HOURS PRN
Status: DISCONTINUED | OUTPATIENT
Start: 2019-10-23 | End: 2019-10-28

## 2019-10-23 NOTE — ED INITIAL ASSESSMENT (HPI)
Patient arrives from home for c/o fever and chills that began yesterday. Temp on 100.7 by EMS, 2 IVs started and fluids given by EMS. Denies cough, denies urinary symptoms.  Bilateral swollen lower extremities, patient states more swollen than usual.

## 2019-10-23 NOTE — PLAN OF CARE
Spoke with Amylda patient caregiver.  She stated patient is currently taking her PCN PO as prescribed

## 2019-10-23 NOTE — CONSULTS
INFECTIOUS DISEASE CONSULTATION    Michael Arias Patient Status:  Inpatient    1960 MRN [id number]   [facility] 2NE-A Attending Dean Wright MD   Hosp Day # 0 PCP Steve Gustafson Metoclopramide HCl (REGLAN) injection 10 mg, 10 mg, Intravenous, Q8H PRN  •  ceFAZolin (ANCEF) IVPB 1g/100ml in 0.9% NaCl minibag/add-van, 1 g, Intravenous, Q8H  •  potassium chloride 40 mEq in sodium chloride 0.9% 250 mL IVPB, 40 mEq, Intravenous, Once  • RBC 4.23   HGB 12.5   HCT 38.5   MCV 91.0   MCH 29.6   MCHC 32.5   RDW 13.8   NEPRELIM 12.89*   WBC 13.6*   .0*       Recent Labs   Lab 10/23/19  1154   *   BUN 14   CREATSERUM 0.59   GFRAA 117   GFRNAA 101   CA 8.2*   ALB 3.0*      K Deep vein thrombosis (DVT) of proximal vein of right lower extremity, unspecified chronicity (HCC)     Hypokalemia     Acute deep vein thrombosis (DVT) of femoral vein of left lower extremity (HCC)     Warfarin anticoagulation     Community acquired pneumo

## 2019-10-23 NOTE — H&P
[facility] H[facility]PITAL[facility]  History and Physical     Allyson Short Patient Status:  Emergency    1960 MRN [id number]   Location [facility] Attending Viktor Juarez MD   Hosp Day # 0 PCP April Briscoe     Chief Complaint: total) 2 (two) times daily. , Disp: 60 tablet, Rfl: 11  Warfarin Sodium 5 MG Oral Tab, Take 5 mg by mouth 3 (three) times a week. Monday, Wednesday, Friday, Saturday, Sunday , Disp: , Rfl:   Acidophilus/Pectin Oral Cap, Take 1 capsule by mouth daily. , Disp: BILT 0.3   TP 7.1       Estimated Creatinine Clearance: 86 mL/min (based on SCr of 0.59 mg/dL). Recent Labs   Lab 10/22/19 10/23/19  1154   PTP  --  27.5*   INR 2.6* 2.38*       No results for input(s): TROP, CK in the last 168 hours.     Imaging: Imag

## 2019-10-23 NOTE — ED PROVIDER NOTES
Patient Seen in: [facility] Emergency Department      History   Patient presents with:  Fever (infectious)    Stated Complaint: fever    HPI    Patient is a 15-year-old female who presents emergency room with a history of fever and chills began yest Pulse 104   Resp 25   Temp 98.8 °F (37.1 °C)   Temp src Oral   SpO2 96 %   O2 Device Nasal cannula       Current:BP (!) 180/132   Pulse 114   Temp 99.7 °F (37.6 °C) (Oral)   Resp 23   Ht 160 cm (5' 3\")   Wt 68 kg   SpO2 99%   BMI 26.57 kg/m²         Phy limits   LACTIC ACID, PLASMA - Abnormal; Notable for the following components:    Lactic Acid 2.6 (*)     All other components within normal limits   PROTHROMBIN TIME (PT) - Abnormal; Notable for the following components:    PT 27.5 (*)     INR 2.38 (*) 1. There is stable partially obstructing thrombus which appears chronic in the right common femoral vein compared to 4/13/2019. No acute DVT. Nonvisualization of the posterior tibial veins bilaterally due to marked edema.     Dictated by: Radha Woods admitted to the hospital for further care at this time. Patient was admitted with no further complaints. Patient troponin is still pending at time of this dictation and will be followed by my collegue ,Dr. An Leslie. .            Disposition and Plan     Cl

## 2019-10-23 NOTE — PLAN OF CARE
Patient received from ED; grunting with one episode of emesis.    AOx3 difficult to understand some speech due to hx of cerebral palsy  Febrile, Ice packs applied  ST on telemetry; lymphedema to lower extremities, left leg warm and redness noted  Patient re

## 2019-10-24 PROCEDURE — 99232 SBSQ HOSP IP/OBS MODERATE 35: CPT | Performed by: HOSPITALIST

## 2019-10-24 RX ORDER — WARFARIN SODIUM 1 MG/1
1 TABLET ORAL
Status: DISCONTINUED | OUTPATIENT
Start: 2019-10-24 | End: 2019-10-28

## 2019-10-24 RX ORDER — WARFARIN SODIUM 5 MG/1
5 TABLET ORAL
Status: DISCONTINUED | OUTPATIENT
Start: 2019-10-25 | End: 2019-10-26

## 2019-10-24 RX ORDER — PHENYTOIN SODIUM 100 MG/1
300 CAPSULE, EXTENDED RELEASE ORAL DAILY
Status: DISCONTINUED | OUTPATIENT
Start: 2019-10-24 | End: 2019-10-28

## 2019-10-24 NOTE — PROGRESS NOTES
[facility]                INFECTIOUS DISEASE PROGRESS NOTE    Romel Herzog Patient Status:  Inpatient    1960 MRN [id number]   [facility] 2NE-A Attending Luis Wheeler MD   Hosp Day # 1 PCP [facility]     Antibiotic chronic, left (HCC)     Hyperglycemia     Azotemia     Cellulitis of left lower extremity     Contusion of leg, left, initial encounter     Gastroesophageal reflux disease     Dehydration     Nausea and vomiting     Acute renal failure, unspecified acute r Consultants  [phone]

## 2019-10-24 NOTE — SLP NOTE
ADULT SWALLOWING EVALUATION    ASSESSMENT    ASSESSMENT/OVERALL IMPRESSION:  Order received for BSE per altered diet consistency protocol. Pt well known to this service from multiple prior admissions.  Pt currently admitted due to LLE cellulitis with underl HISTORY  Reason for Referral: Altered diet consistency    Problem List  Principal Problem:    Cellulitis of left lower extremity  Active Problems:     At risk for falling    Cerebral palsy, unspecified type ([address]    Chronic deep vein thrombosis (DVT) of pro referral.   If you have any questions, please contact Marlon Maldonado 87 CCC-SLP/L, pager [phone]  Speech-LanguagePathologist

## 2019-10-24 NOTE — PLAN OF CARE
Assumed patient care at 0730. Pt A&O x 4, SPO2 94% on RA, sinus rhythm/sinus tachy on tele. Up with total lift. H/O cerebral palsy, BUE contractures. L leg lymphedema, redness, swelling.  Turned and repositioned every 2 hours when in bed, heels elevated off

## 2019-10-24 NOTE — OCCUPATIONAL THERAPY NOTE
Received order for OT evaluation. Per chart review and OT note in July, \"Pt transfers via kong at baseline. Pt has assist from caregiver for dressing, toileting and bathing at baseline. Has been feeding self during this admission.  \" No skilled therapy i

## 2019-10-24 NOTE — PHYSICAL THERAPY NOTE
PT order received via fall risk protocol, chart reviewed. Pt known to writer and dept from previous admits. Pt has caregiver support and has been dependent for all functional mobilities with use of kong lift and motorized wheelchair with tilting feature.

## 2019-10-24 NOTE — PROGRESS NOTES
[facility]ITAL[facility]  Progress Note     Giuliana Perez Patient Status:  Inpatient    1960 MRN [id number]   [facility] 2NE-A Attending Pino Quinteros MD   Hosp Day # 1 PCP Lindy Mcqueen     Chief Complaint: Cellulitis    S: Patient st Labs   Lab 10/22/19 10/23/19  1154   PTP  --  27.5*   INR 2.6* 2.38*       Recent Labs   Lab 10/23/19  1154   TROP <0.045            Imaging: Imaging data reviewed in Epic.     Medications:   • Phenytoin Sodium Extended  300 mg Oral Daily   • Warfarin Sodiu

## 2019-10-24 NOTE — PLAN OF CARE
ALERT AND ORIENTED BUT SLURRED SPEECH WITH IVF OF 0.9NSTL AT 75 CC/HR IN PROGRESS PATENT AND INTACT. LEFT LEG SWOLLEN AND RED AND ELEVATED ON PILLOW. INCONTINENT AND KEPT CLEAN AND DRY AT ALL TIMES. DENIES ANY PAIN.  ALL NEEDS ATTENDED AND WILL CONTINUE TO

## 2019-10-24 NOTE — DIETARY NOTE
[facility]    NUTRITION INITIAL ASSESSMENT    Pt does not meet malnutrition criteria.     NUTRITION DIAGNOSIS/PROBLEM:    Inadequate energy intake related to decreased appetite as evidenced by consult for at nutrition risk and observed finished breakfa No  Cultural/Ethnic/[place]atsdin Preferences Addresses: Yes    NUTRITION RELATED PHYSICAL FINDINGS:     1. Body Fat/Muscle Mass:noted no evident signs of fat/muscle wasting per visual exam.    NUTRITION PRESCRIPTION:  Calories:7100-9776 calories/day (23-25 ca

## 2019-10-25 PROCEDURE — 99233 SBSQ HOSP IP/OBS HIGH 50: CPT | Performed by: STUDENT IN AN ORGANIZED HEALTH CARE EDUCATION/TRAINING PROGRAM

## 2019-10-25 RX ORDER — POTASSIUM CHLORIDE 1.5 G/1.77G
40 POWDER, FOR SOLUTION ORAL EVERY 4 HOURS
Status: DISCONTINUED | OUTPATIENT
Start: 2019-10-25 | End: 2019-10-25

## 2019-10-25 RX ORDER — FUROSEMIDE 10 MG/ML
20 INJECTION INTRAMUSCULAR; INTRAVENOUS ONCE
Status: COMPLETED | OUTPATIENT
Start: 2019-10-25 | End: 2019-10-25

## 2019-10-25 NOTE — CM/SW NOTE
MSW received a return call from [place] [phone] with [facility]. The patient is current. MSW sent ECIN updates to [employer].     Claudia Mauro LCSW

## 2019-10-25 NOTE — PLAN OF CARE
ASLEEP DURING ROUNDS BUT EASILY AROUSABLE ON TELE MONITOR HR 90'S SINUS RHYTHM. IVF OF 0.9NSTL AT 75 CC/HR IN PROGRESS PATENT AND INTACT. LEFT LEG RED AND SWOLLEN AND ELEVATED ON PILLOW. ALL NEEDS ATTENDED AND WILL CONTINUE TO MONITOR.  CALL LIGHT WITHIN RE

## 2019-10-25 NOTE — CM/SW NOTE
10/25/19 1000   CM/SW Referral Data   Referral Source Physician   Reason for Referral Discharge planning   Informant Patient   Social History   Recreational Drug/Alcohol Use no   Major Changes Last 6 Months no   Domestic/Partner Violence no   Suicidal I

## 2019-10-25 NOTE — PROGRESS NOTES
[facility]                INFECTIOUS DISEASE PROGRESS NOTE    Fransisca Frye Patient Status:  Inpatient    1960 MRN [id number]   [facility] 2NE-A Attending Christina Green MD   Hosp Day # 2 PCP [facility] left     Contusion of leg     Ulcer of leg, chronic, left (HCC)     Hyperglycemia     Azotemia     Cellulitis of left lower extremity     Contusion of leg, left, initial encounter     Gastroesophageal reflux disease     Dehydration     Nausea and vomiting resume PCN prophylaxis      Anai Wolf MD  [facility]  [phone]

## 2019-10-25 NOTE — CM/SW NOTE
MSW received return call from [facility]. They are not current with the patient. MSW called the patient's mother who also does not know the name of the [address]   She provided MSW with the name and number of the patient's caregiver Elroy [phone]

## 2019-10-25 NOTE — PLAN OF CARE
Assumed care of patient around 0730. Patient awake and alert x4, in bed. Denies any chest pain or SOB. Room air. Sinus tach on tele. . Seizure and aspiration precautions in place. Purewick in place. IV fluids running per order.  Bilateral legs elevated o Goal  Description  Patient's Long Term Goal: go home    Interventions:  - monitor leg swelling  -IV antibiotics  - See additional Care Plan goals for specific interventions   Outcome: Progressing  Goal: Patient/Family Short Term Goal  Description  Patient'

## 2019-10-25 NOTE — PROGRESS NOTES
[facility]ITAL[facility]  Progress Note     Jason Patel Patient Status:  Inpatient    1960 MRN [id number]   [facility] 2NE-A Attending Liz Clemons MD   Hosp Day # 2 PCP Zahraa Clinton     Chief Complaint: LE edema, cellulitis     S: 10/23/19  1154 10/25/19  0533   PTP  --  27.5* 19.8*   INR 2.6* 2.38* 1.59*       Recent Labs   Lab 10/23/19  1154   TROP <0.045            Imaging: Imaging data reviewed in Epic.     Medications:   • potassium chloride 40mEq IVPB (peripheral line)  40 mEq

## 2019-10-26 PROCEDURE — 99232 SBSQ HOSP IP/OBS MODERATE 35: CPT | Performed by: HOSPITALIST

## 2019-10-26 RX ORDER — GARLIC EXTRACT 500 MG
1 CAPSULE ORAL DAILY
Status: DISCONTINUED | OUTPATIENT
Start: 2019-10-26 | End: 2019-10-28

## 2019-10-26 RX ORDER — FUROSEMIDE 10 MG/ML
20 INJECTION INTRAMUSCULAR; INTRAVENOUS ONCE
Status: COMPLETED | OUTPATIENT
Start: 2019-10-26 | End: 2019-10-26

## 2019-10-26 RX ORDER — WARFARIN SODIUM 7.5 MG/1
7.5 TABLET ORAL
Status: COMPLETED | OUTPATIENT
Start: 2019-10-26 | End: 2019-10-26

## 2019-10-26 NOTE — PLAN OF CARE
ALERT AND ORIENTED ON TELE MONITOR HR 90'S SINUS RHYTHM. LEFT LEG STILL SWOLLEN AND RED AND ELEVATED ON PILLOW. DENIES ANY PAIN AT THIS TIME. ALL NEEDS ATTENDED AND WILL CONTINUE TO MONITOR. CALL LIGHT WITHIN REACH.   Problem: CARDIOVASCULAR - ADULT  Goal: additional Care Plan goals for specific interventions   Outcome: Progressing  Goal: Patient/Family Short Term Goal  Description  Patient's Short Term Goal: no more leg swelling. Celsa Riedel Celsa Riedel \"that's not normal\"    Interventions:   - IV antibiotics  - monitor temp  -

## 2019-10-26 NOTE — PROGRESS NOTES
[facility]PITAL[facility]  Progress Note     Marshal Rizzo Patient Status:  Inpatient    1960 MRN [id number]   [facility] 2NE-A Attending Gm [facility] Day # 3 PCP [facility]      Chief Complaint: LLE cellulitis    S ALT 20  --   --   --    BILT 0.3  --   --   --    TP 7.1  --   --   --        Estimated Creatinine Clearance: 86 mL/min (based on SCr of 0.59 mg/dL).     Recent Labs   Lab 10/23/19  1154 10/25/19  0533 10/26/19  0551   PTP 27.5* 19.8* 16.7*   INR 2.38* 1

## 2019-10-26 NOTE — PLAN OF CARE
Received patient at 0730. Alert and Oriented x4. Tele Rhythm ST. O2 saturation 97% on room air. Breath sounds diminished. Bed is locked and in low position. Call light and personal items within reach. No C/O chest pain or SOB. Pt has purewick in place.  BLE quality is noted   Outcome: Progressing     Problem: Patient/Family Goals  Goal: Patient/Family Long Term Goal  Description  Patient's Long Term Goal: go home    Interventions:  - monitor leg swelling  -IV antibiotics  - See additional Care Plan goals for

## 2019-10-26 NOTE — PROGRESS NOTES
[facility]                INFECTIOUS DISEASE PROGRESS NOTE    Art Agrawal Patient Status:  Inpatient    1960 MRN [id number]   [facility] 2NE-A Attending Amina Forte MD   Hosp Day # 3 PCP [facility] [address]     Lymphedema     At risk for falling     Contusion of leg, left     Contusion of leg     Ulcer of leg, chronic, left (HCC)     Hyperglycemia     Azotemia     Cellulitis of left lower extremity     Contusion of leg, left, initial encounter     Rahul Muñoz ivabx - possible switch to po in a couple days if improved      Danyell Roman MD  [facility]  [phone]

## 2019-10-27 PROCEDURE — 99232 SBSQ HOSP IP/OBS MODERATE 35: CPT | Performed by: HOSPITALIST

## 2019-10-27 RX ORDER — SIMETHICONE 80 MG
80 TABLET,CHEWABLE ORAL
Status: DISCONTINUED | OUTPATIENT
Start: 2019-10-27 | End: 2019-10-28

## 2019-10-27 RX ORDER — WARFARIN SODIUM 7.5 MG/1
7.5 TABLET ORAL
Status: COMPLETED | OUTPATIENT
Start: 2019-10-27 | End: 2019-10-27

## 2019-10-27 RX ORDER — POTASSIUM CHLORIDE 20 MEQ/1
40 TABLET, EXTENDED RELEASE ORAL EVERY 4 HOURS
Status: COMPLETED | OUTPATIENT
Start: 2019-10-27 | End: 2019-10-27

## 2019-10-27 NOTE — PLAN OF CARE
ALERT AND ORIENTED X4 ON TELE MONITOR HR 80'S SINUS RHYTHM. KEPT CLEAN AND DRY AT ALL TIMES. DENIES ANY PAIN. ALL NEEDS ATTENDED AND WILL CONTINUE TO MONITOR. CALL LIGHT WITHIN REACH.   Problem: CARDIOVASCULAR - ADULT  Goal: Maintains optimal cardiac output specific interventions   Outcome: Progressing  Goal: Patient/Family Short Term Goal  Description  Patient's Short Term Goal: no more leg swelling. Poole Claude Poole Claude \"that's not normal\"    Interventions:   - IV antibiotics  - monitor temp  - See additional Care Plan goals

## 2019-10-27 NOTE — PROGRESS NOTES
[facility]                INFECTIOUS DISEASE PROGRESS NOTE    Marshal Rizzo Patient Status:  Inpatient    1960 MRN [id number]   [facility] 2NE-A Attending Hermelinda Boogie MD   Hosp Day # 4 PCP [facility] Problem List:     Cellulitis     Cerebral palsy [facility]     Seizure disorder ([facility]     Lymphedema     At risk for falling     Contusion of leg, left     Contusion of leg     Ulcer of leg, chronic, left (HCC)     Hyperglycemia     Azotemia     Cellulitis of l cerebal palsy  -despite PO PCN prophyalxis    Much improved  Continue Cefazolin  Possible transition to PO in the next 1-2 days        Abby Toure MD  [facility]  [phone]

## 2019-10-27 NOTE — PLAN OF CARE
Received patient at 0730. Alert and Oriented x4. Tele Rhythm ST.  O2 saturation 96% on room air. Breath sounds diminished. Bed is locked and in low position. Call light and personal items within reach. No C/O chest pain or SOB. Pt has purewick in place.  B Outcome: Progressing     Problem: Patient/Family Goals  Goal: Patient/Family Long Term Goal  Description  Patient's Long Term Goal: go home    Interventions:  - monitor leg swelling  -IV antibiotics  - See additional Care Plan goals for specific interven

## 2019-10-27 NOTE — PROGRESS NOTES
[facility]PITAL[facility]  Progress Note     Kamala Maradiaga Patient Status:  Inpatient    1960 MRN [id number]   [facility] 2NE-A Attending jaguar [facility] Day # 4 PCP [facility]      Chief Complaint: LLE cellulitis    S 111  --   --   --    CO2 21.0  --   --   --    ALKPHO 60  --   --   --    AST 13*  --   --   --    ALT 20  --   --   --    BILT 0.3  --   --   --    TP 7.1  --   --   --        Estimated Creatinine Clearance: 86 mL/min (based on SCr of 0.59 mg/dL).     Rece

## 2019-10-28 VITALS
DIASTOLIC BLOOD PRESSURE: 76 MMHG | WEIGHT: 162 LBS | TEMPERATURE: 100 F | BODY MASS INDEX: 28.7 KG/M2 | HEART RATE: 96 BPM | RESPIRATION RATE: 20 BRPM | SYSTOLIC BLOOD PRESSURE: 125 MMHG | OXYGEN SATURATION: 96 % | HEIGHT: 63 IN

## 2019-10-28 PROCEDURE — 99232 SBSQ HOSP IP/OBS MODERATE 35: CPT | Performed by: HOSPITALIST

## 2019-10-28 RX ORDER — PENICILLIN V POTASSIUM 500 MG/1
500 TABLET ORAL 2 TIMES DAILY
Qty: 60 TABLET | Refills: 11 | Status: SHIPPED | OUTPATIENT
Start: 2019-11-28

## 2019-10-28 RX ORDER — CEPHALEXIN 500 MG/1
500 CAPSULE ORAL 3 TIMES DAILY
Qty: 90 CAPSULE | Refills: 0 | Status: SHIPPED | OUTPATIENT
Start: 2019-10-28 | End: 2019-11-27

## 2019-10-28 NOTE — CM/SW NOTE
MSW arranged [facility] for 5:30  going home. Gricelda Borden notified via [address]. PCS form placed on chart.       [facility]:  [id number]    Coral Maciel LCSW

## 2019-10-28 NOTE — PROGRESS NOTES
[facility] H[facility]PITAL[facility]  Progress Note     Marilia Rodas Patient Status:  Inpatient    1960 MRN [id number]   [facility] 2NE-A Attending Yol[facility] Day # 5 PCP [facility]      Chief Complaint: LLE cellulitis    S --    K 3.3*   < > 3.9 3.5 4.3     --   --  101  --    CO2 21.0  --   --  29.0  --    ALKPHO 60  --   --   --   --    AST 13*  --   --   --   --    ALT 20  --   --   --   --    BILT 0.3  --   --   --   --    TP 7.1  --   --   --   --     < > = values

## 2019-10-28 NOTE — PLAN OF CARE
Assumed care for pt at 19:30 on 10/27    A&Ox3, baseline slurred speech d/t cerebral palsy  Denies pain or SOB  VSS on RA, spO2 94%  ST/SR on tele  Lungs diminished. LLE +2 red hot swelling  Purewick in place  Repositioned to pt's comfort.  Able to use call

## 2019-10-28 NOTE — PROGRESS NOTES
NURSING DISCHARGE NOTE    Discharged to [facility] via Ambulance. Accompanied by Support staff  Belongings Taken by patient/family. Pt caregiver and family member notified of discharge.

## 2019-10-28 NOTE — PROGRESS NOTES
[facility]                INFECTIOUS DISEASE PROGRESS NOTE    Sulaiman Sewell Patient Status:  Inpatient    1960 MRN [id number]   [facility] 2NE-A Attending Pramod Holcomb MD   Hosp Day # 5 PCP [facility] Result Value Ref Range    Blood Culture Result No Growth 4 Days N/A           Problem list reviewed:  Patient Active Problem List:     Cellulitis     Cerebral palsy ([facility] [address]     Seizure disorder (HCC)     Lymphedema     At risk for falling     Contusion of proximal vein of right lower extremity (HCC)      ASSESSMENT/PLAN:    1.  Recurrent LLE cellulits  Chronic lymphedema, cerebal palsy  -despite PO PCN prophyalxis    Much improved  Continue Cefazolin      Home today PO KEFLEX x 30 days, then resume PO PCN

## 2019-10-28 NOTE — PLAN OF CARE
Pt is A&O X3, not in any distress, on RA, SR per tele, voiced no c/o`s at present. Poc updated, d/c planning today.   Problem: CARDIOVASCULAR - ADULT  Goal: Maintains optimal cardiac output and hemodynamic stability  Description  INTERVENTIONS:  - Monitor v Term Goal  Description  Patient's Short Term Goal: no more leg swelling. [place] \"that's not normal\"    Interventions:   - IV antibiotics  - monitor temp  - See additional Care Plan goals for specific interventions   Outcome: Progressing     Problem: Patient/Fa

## 2019-10-29 ENCOUNTER — ANTI-COAG VISIT (OUTPATIENT)
Dept: HEMATOLOGY/ONCOLOGY | Facility: HOSPITAL | Age: 59
End: 2019-10-29

## 2019-10-29 DIAGNOSIS — I82.412 ACUTE DEEP VEIN THROMBOSIS (DVT) OF FEMORAL VEIN OF LEFT LOWER EXTREMITY (HCC): ICD-10-CM

## 2019-11-01 ENCOUNTER — ANTI-COAG VISIT (OUTPATIENT)
Dept: HEMATOLOGY/ONCOLOGY | Facility: HOSPITAL | Age: 59
End: 2019-11-01

## 2019-11-01 DIAGNOSIS — I82.412 ACUTE DEEP VEIN THROMBOSIS (DVT) OF FEMORAL VEIN OF LEFT LOWER EXTREMITY (HCC): ICD-10-CM

## 2019-11-08 ENCOUNTER — ANTI-COAG VISIT (OUTPATIENT)
Dept: HEMATOLOGY/ONCOLOGY | Facility: HOSPITAL | Age: 59
End: 2019-11-08

## 2019-11-08 DIAGNOSIS — I82.412 ACUTE DEEP VEIN THROMBOSIS (DVT) OF FEMORAL VEIN OF LEFT LOWER EXTREMITY (HCC): ICD-10-CM

## 2019-11-11 RX ORDER — WARFARIN SODIUM 1 MG/1
TABLET ORAL
Qty: 180 TABLET | Refills: 3 | Status: SHIPPED | OUTPATIENT
Start: 2019-11-11

## 2019-11-18 RX ORDER — WARFARIN SODIUM 5 MG/1
5 TABLET ORAL NIGHTLY
Qty: 90 TABLET | Refills: 3 | Status: SHIPPED | OUTPATIENT
Start: 2019-11-18

## 2019-11-19 ENCOUNTER — ANTI-COAG VISIT (OUTPATIENT)
Dept: HEMATOLOGY/ONCOLOGY | Facility: HOSPITAL | Age: 59
End: 2019-11-19

## 2019-11-19 DIAGNOSIS — I82.412 ACUTE DEEP VEIN THROMBOSIS (DVT) OF FEMORAL VEIN OF LEFT LOWER EXTREMITY (HCC): ICD-10-CM

## 2019-11-20 NOTE — DISCHARGE SUMMARY
[facility] HOSPITALIST  DISCHARGE SUMMARY     Hunter Aguirre Patient Status:  Inpatient    1960 MRN [id number]   [facility] 2NE-A Attending No att. providers found   Hosp Day # 5 PCP Whitley Agustin     Date of Admission: 10/23/2019  Mark Instructions Prescription details   penicillin v potassium 500 MG Tabs  Commonly known as:  VEETID  Start taking on:  November 28, 2019  What changed:    · See the new instructions. · These instructions start on November 28, 2019.  If you are unsure what t nontender, nondistended. Positive bowel sounds. No rebound or guarding. Neurologic: No focal neurological deficits. Musculoskeletal: Moves all extremities. Extremities: No edema.   -----------------------------------------------------------------------

## 2019-11-26 ENCOUNTER — ANTI-COAG VISIT (OUTPATIENT)
Dept: HEMATOLOGY/ONCOLOGY | Facility: HOSPITAL | Age: 59
End: 2019-11-26

## 2019-11-26 DIAGNOSIS — I82.412 ACUTE DEEP VEIN THROMBOSIS (DVT) OF FEMORAL VEIN OF LEFT LOWER EXTREMITY (HCC): ICD-10-CM

## 2019-11-29 ENCOUNTER — ANTI-COAG VISIT (OUTPATIENT)
Dept: HEMATOLOGY/ONCOLOGY | Facility: HOSPITAL | Age: 59
End: 2019-11-29

## 2019-11-29 DIAGNOSIS — I82.412 ACUTE DEEP VEIN THROMBOSIS (DVT) OF FEMORAL VEIN OF LEFT LOWER EXTREMITY (HCC): ICD-10-CM

## 2019-12-05 ENCOUNTER — ANTI-COAG VISIT (OUTPATIENT)
Dept: HEMATOLOGY/ONCOLOGY | Facility: HOSPITAL | Age: 59
End: 2019-12-05

## 2019-12-05 DIAGNOSIS — I82.412 ACUTE DEEP VEIN THROMBOSIS (DVT) OF FEMORAL VEIN OF LEFT LOWER EXTREMITY (HCC): ICD-10-CM

## 2019-12-12 ENCOUNTER — ANTI-COAG VISIT (OUTPATIENT)
Dept: HEMATOLOGY/ONCOLOGY | Facility: HOSPITAL | Age: 59
End: 2019-12-12

## 2019-12-12 DIAGNOSIS — I82.412 ACUTE DEEP VEIN THROMBOSIS (DVT) OF FEMORAL VEIN OF LEFT LOWER EXTREMITY (HCC): ICD-10-CM

## 2019-12-19 ENCOUNTER — ANTI-COAG VISIT (OUTPATIENT)
Dept: HEMATOLOGY/ONCOLOGY | Facility: HOSPITAL | Age: 59
End: 2019-12-19

## 2019-12-19 DIAGNOSIS — I82.412 ACUTE DEEP VEIN THROMBOSIS (DVT) OF FEMORAL VEIN OF LEFT LOWER EXTREMITY (HCC): ICD-10-CM

## 2019-12-31 ENCOUNTER — ANTI-COAG VISIT (OUTPATIENT)
Dept: HEMATOLOGY/ONCOLOGY | Facility: HOSPITAL | Age: 59
End: 2019-12-31

## 2019-12-31 DIAGNOSIS — I82.412 ACUTE DEEP VEIN THROMBOSIS (DVT) OF FEMORAL VEIN OF LEFT LOWER EXTREMITY (HCC): ICD-10-CM

## 2020-01-03 ENCOUNTER — ANTI-COAG VISIT (OUTPATIENT)
Dept: HEMATOLOGY/ONCOLOGY | Facility: HOSPITAL | Age: 60
End: 2020-01-03

## 2020-01-03 DIAGNOSIS — I82.412 ACUTE DEEP VEIN THROMBOSIS (DVT) OF FEMORAL VEIN OF LEFT LOWER EXTREMITY (HCC): ICD-10-CM

## 2020-01-03 LAB — INR: 1.6 (ref 0.8–1.2)

## 2020-01-10 ENCOUNTER — ANTI-COAG VISIT (OUTPATIENT)
Dept: HEMATOLOGY/ONCOLOGY | Facility: HOSPITAL | Age: 60
End: 2020-01-10

## 2020-01-10 DIAGNOSIS — I82.412 ACUTE DEEP VEIN THROMBOSIS (DVT) OF FEMORAL VEIN OF LEFT LOWER EXTREMITY (HCC): ICD-10-CM

## 2020-01-10 LAB — INR: 2.4 (ref 0.8–1.2)

## 2020-01-10 RX ORDER — WARFARIN SODIUM 1 MG/1
TABLET ORAL
Qty: 288 TABLET | Refills: 1 | Status: SHIPPED | OUTPATIENT
Start: 2020-01-10 | End: 2020-06-02

## 2020-01-17 ENCOUNTER — ANTI-COAG VISIT (OUTPATIENT)
Dept: HEMATOLOGY/ONCOLOGY | Facility: HOSPITAL | Age: 60
End: 2020-01-17

## 2020-01-17 DIAGNOSIS — I82.412 ACUTE DEEP VEIN THROMBOSIS (DVT) OF FEMORAL VEIN OF LEFT LOWER EXTREMITY (HCC): ICD-10-CM

## 2020-01-17 LAB — INR: 2.5 (ref 0.8–1.2)

## 2020-01-23 ENCOUNTER — ANTI-COAG VISIT (OUTPATIENT)
Dept: HEMATOLOGY/ONCOLOGY | Facility: HOSPITAL | Age: 60
End: 2020-01-23

## 2020-01-23 DIAGNOSIS — I82.412 ACUTE DEEP VEIN THROMBOSIS (DVT) OF FEMORAL VEIN OF LEFT LOWER EXTREMITY (HCC): ICD-10-CM

## 2020-01-23 LAB — INR: 2.3 (ref 0.8–1.2)

## 2020-01-30 ENCOUNTER — ANTI-COAG VISIT (OUTPATIENT)
Dept: HEMATOLOGY/ONCOLOGY | Facility: HOSPITAL | Age: 60
End: 2020-01-30

## 2020-01-30 DIAGNOSIS — I82.412 ACUTE DEEP VEIN THROMBOSIS (DVT) OF FEMORAL VEIN OF LEFT LOWER EXTREMITY (HCC): ICD-10-CM

## 2020-01-30 LAB — INR: 2.3 (ref 0.8–1.2)

## 2020-02-13 ENCOUNTER — ANTI-COAG VISIT (OUTPATIENT)
Dept: HEMATOLOGY/ONCOLOGY | Facility: HOSPITAL | Age: 60
End: 2020-02-13

## 2020-02-13 DIAGNOSIS — I82.412 ACUTE DEEP VEIN THROMBOSIS (DVT) OF FEMORAL VEIN OF LEFT LOWER EXTREMITY (HCC): ICD-10-CM

## 2020-02-13 LAB — INR: 3.8 (ref 0.8–1.2)

## 2020-02-20 ENCOUNTER — ANTI-COAG VISIT (OUTPATIENT)
Dept: HEMATOLOGY/ONCOLOGY | Facility: HOSPITAL | Age: 60
End: 2020-02-20

## 2020-02-20 DIAGNOSIS — I82.412 ACUTE DEEP VEIN THROMBOSIS (DVT) OF FEMORAL VEIN OF LEFT LOWER EXTREMITY (HCC): ICD-10-CM

## 2020-02-20 LAB — INR: 2.3 (ref 0.8–1.2)

## 2020-02-27 ENCOUNTER — ANTI-COAG VISIT (OUTPATIENT)
Dept: HEMATOLOGY/ONCOLOGY | Facility: HOSPITAL | Age: 60
End: 2020-02-27

## 2020-02-27 DIAGNOSIS — I82.412 ACUTE DEEP VEIN THROMBOSIS (DVT) OF FEMORAL VEIN OF LEFT LOWER EXTREMITY (HCC): ICD-10-CM

## 2020-02-27 LAB — INR: 2.1 (ref 0.8–1.2)

## 2020-03-06 ENCOUNTER — ANTI-COAG VISIT (OUTPATIENT)
Dept: HEMATOLOGY/ONCOLOGY | Facility: HOSPITAL | Age: 60
End: 2020-03-06

## 2020-03-06 DIAGNOSIS — I82.412 ACUTE DEEP VEIN THROMBOSIS (DVT) OF FEMORAL VEIN OF LEFT LOWER EXTREMITY (HCC): ICD-10-CM

## 2020-03-06 LAB — INR: 2.2 (ref 0.8–1.2)

## 2020-03-13 ENCOUNTER — ANTI-COAG VISIT (OUTPATIENT)
Dept: HEMATOLOGY/ONCOLOGY | Facility: HOSPITAL | Age: 60
End: 2020-03-13

## 2020-03-13 DIAGNOSIS — I82.412 ACUTE DEEP VEIN THROMBOSIS (DVT) OF FEMORAL VEIN OF LEFT LOWER EXTREMITY (HCC): ICD-10-CM

## 2020-03-13 LAB — INR: 2.1 (ref 0.8–1.2)

## 2020-03-20 ENCOUNTER — ANTI-COAG VISIT (OUTPATIENT)
Dept: HEMATOLOGY/ONCOLOGY | Facility: HOSPITAL | Age: 60
End: 2020-03-20

## 2020-03-20 DIAGNOSIS — I82.412 ACUTE DEEP VEIN THROMBOSIS (DVT) OF FEMORAL VEIN OF LEFT LOWER EXTREMITY (HCC): ICD-10-CM

## 2020-03-20 LAB — INR: 2.2 (ref 0.8–1.2)

## 2020-04-08 ENCOUNTER — ANTI-COAG VISIT (OUTPATIENT)
Dept: HEMATOLOGY/ONCOLOGY | Facility: HOSPITAL | Age: 60
End: 2020-04-08

## 2020-04-08 DIAGNOSIS — I82.412 ACUTE DEEP VEIN THROMBOSIS (DVT) OF FEMORAL VEIN OF LEFT LOWER EXTREMITY (HCC): ICD-10-CM

## 2020-04-15 ENCOUNTER — ANTI-COAG VISIT (OUTPATIENT)
Dept: HEMATOLOGY/ONCOLOGY | Facility: HOSPITAL | Age: 60
End: 2020-04-15

## 2020-04-15 DIAGNOSIS — I82.412 ACUTE DEEP VEIN THROMBOSIS (DVT) OF FEMORAL VEIN OF LEFT LOWER EXTREMITY (HCC): ICD-10-CM

## 2020-04-22 ENCOUNTER — ANTI-COAG VISIT (OUTPATIENT)
Dept: HEMATOLOGY/ONCOLOGY | Facility: HOSPITAL | Age: 60
End: 2020-04-22

## 2020-04-22 DIAGNOSIS — I82.412 ACUTE DEEP VEIN THROMBOSIS (DVT) OF FEMORAL VEIN OF LEFT LOWER EXTREMITY (HCC): ICD-10-CM

## 2020-04-28 ENCOUNTER — ANTI-COAG VISIT (OUTPATIENT)
Dept: HEMATOLOGY/ONCOLOGY | Facility: HOSPITAL | Age: 60
End: 2020-04-28

## 2020-04-28 DIAGNOSIS — I82.412 ACUTE DEEP VEIN THROMBOSIS (DVT) OF FEMORAL VEIN OF LEFT LOWER EXTREMITY (HCC): ICD-10-CM

## 2020-05-06 ENCOUNTER — ANTI-COAG VISIT (OUTPATIENT)
Dept: HEMATOLOGY/ONCOLOGY | Facility: HOSPITAL | Age: 60
End: 2020-05-06

## 2020-05-06 DIAGNOSIS — I82.412 ACUTE DEEP VEIN THROMBOSIS (DVT) OF FEMORAL VEIN OF LEFT LOWER EXTREMITY (HCC): ICD-10-CM

## 2020-05-14 ENCOUNTER — ANTI-COAG VISIT (OUTPATIENT)
Dept: HEMATOLOGY/ONCOLOGY | Facility: HOSPITAL | Age: 60
End: 2020-05-14

## 2020-05-14 DIAGNOSIS — I82.412 ACUTE DEEP VEIN THROMBOSIS (DVT) OF FEMORAL VEIN OF LEFT LOWER EXTREMITY (HCC): ICD-10-CM

## 2020-06-02 RX ORDER — WARFARIN SODIUM 1 MG/1
TABLET ORAL
Qty: 288 TABLET | Refills: 0 | Status: SHIPPED | OUTPATIENT
Start: 2020-06-02

## 2020-06-02 RX ORDER — WARFARIN SODIUM 1 MG/1
TABLET ORAL
Qty: 308 TABLET | Refills: 0 | OUTPATIENT
Start: 2020-06-02

## 2020-06-26 ENCOUNTER — TELEPHONE (OUTPATIENT)
Dept: HEMATOLOGY/ONCOLOGY | Facility: HOSPITAL | Age: 60
End: 2020-06-26

## 2020-06-26 ENCOUNTER — ANTI-COAG VISIT (OUTPATIENT)
Dept: HEMATOLOGY/ONCOLOGY | Facility: HOSPITAL | Age: 60
End: 2020-06-26

## 2020-06-26 DIAGNOSIS — I82.412 ACUTE DEEP VEIN THROMBOSIS (DVT) OF FEMORAL VEIN OF LEFT LOWER EXTREMITY (HCC): ICD-10-CM

## 2020-06-26 NOTE — TELEPHONE ENCOUNTER
Home care physicians called say that Yuni Womack is no longer under home health. They will be getting her INR drawn next week but asked that they get a call to go over what the Dr wants to do moving forward.

## 2020-09-08 RX ORDER — WARFARIN SODIUM 1 MG/1
TABLET ORAL
Qty: 288 TABLET | Refills: 0 | OUTPATIENT
Start: 2020-09-08

## 2021-01-18 ENCOUNTER — HOSPITAL ENCOUNTER (EMERGENCY)
Facility: HOSPITAL | Age: 61
Discharge: HOME | End: 2021-01-18
Attending: EMERGENCY MEDICINE
Payer: MEDICARE

## 2021-01-18 VITALS
SYSTOLIC BLOOD PRESSURE: 129 MMHG | HEART RATE: 76 BPM | TEMPERATURE: 99 F | DIASTOLIC BLOOD PRESSURE: 93 MMHG | OXYGEN SATURATION: 96 % | RESPIRATION RATE: 16 BRPM

## 2021-01-18 DIAGNOSIS — T19.9XXA FOREIGN BODY IN GENITOURINARY TRACT, INITIAL ENCOUNTER: Primary | ICD-10-CM

## 2021-01-18 PROCEDURE — 99281 EMR DPT VST MAYX REQ PHY/QHP: CPT

## 2021-01-18 PROCEDURE — 99283 EMERGENCY DEPT VISIT LOW MDM: CPT

## 2021-01-18 NOTE — ED INITIAL ASSESSMENT (HPI)
Patient presents to ED after attempting to place silicone straw in vagina. Patient states that she called medics because she could not find straw and unsure if straw was still in vagina. Silicone straw found on patients back while undressing her.  Patient v

## 2021-01-18 NOTE — ED PROVIDER NOTES
Patient Seen in: [facility] Emergency Department      History   Patient presents with:  Eval-G    Stated Complaint: eval-g    HPI/Subjective:   HPI    Patient has a medical history as noted below.     EMS was called because the patient believed that alert, no distress. Head: Normocephalic and atraumatic. Eyes: Conjunctivae are normal.  Pupils equal, round. Neck: Normal range of motion. Neck supple. Cardiovascular: Extremities appear well perfused, no cyanosis. Pulmonary/Chest: Normal effort.   Epimenio Julius

## 2021-02-22 RX ORDER — WARFARIN SODIUM 5 MG/1
TABLET ORAL
Qty: 90 TABLET | Refills: 3 | OUTPATIENT
Start: 2021-02-22

## 2021-04-14 NOTE — ED AVS SNAPSHOT
[facility] Emergency Department    [place]  [address] [address]    Phone:  [phone]    Fax:  [address]   MRN: [id number]    Department:  [facility] Emergency Department   Date of Visit:  3/28 IF THERE IS ANY CHANGE OR WORSENING OF YOUR CONDITION, CALL YOUR PRIMARY CARE PHYSICIAN AT ONCE OR RETURN IMMEDIATELY TO THE EMERGENCY DEPARTMENT.     If you have been prescribed any medication(s), please fill your prescription right away and begin taking t Enbrel Counseling:  I discussed with the patient the risks of etanercept including but not limited to myelosuppression, immunosuppression, autoimmune hepatitis, demyelinating diseases, lymphoma, and infections.  The patient understands that monitoring is required including a PPD at baseline and must alert us or the primary physician if symptoms of infection or other concerning signs are noted.

## 2021-10-16 NOTE — DISCHARGE SUMMARY
[facility] HOSPITALIST  DISCHARGE SUMMARY     Marshal Rizzo Patient Status:  Inpatient    1960 MRN [id number]   [facility] 3NW-A Attending Noel Ellison MD   Hosp Day # 1 PCP Abbey Demarco     Date of Admission: 2018  Date of descriptions):  • none    Lab/Test results pending at Discharge:   · none    Consultants:  • ID    Discharge Medication List:     Discharge Medications      START taking these medications      Instructions Prescription details   Cefadroxil 500 MG Caps  Com multiparous mom/staff request pump request/multiparous mom/staff request Met dyad with baby 38 hrs old. Wt loss 2.74%. Voiding & stooling for DOL. . Was on lovenox & heparin during pregnancy. Mom had latching issues with her now 22 month old daughter. She  for only 2 days but exclusively pumped for 6 months. Mom has inverted short nipples. Hand pump given; nipples everted with the stimulation. Colostrum dripping from the brief pumping. Baby oral assessment done. No tethering detected. Baby placed in cross cradle hold with proper positioning with ear/shoulder/hip alignment. Instructed on supporting & shaping breasts to help baby achieve deeper latch. After few fussy attempts, baby able to latch on to left breast with wide mouth & flanged lips. Strong sucking observed. Mom denies nipple pain or pinching @ this time. FOB supportive. Education & reassurance provided. All questions answered @ this time. RN aware of consult session & later informed LC that Mom requested electric breast pump./pump request/multiparous mom/staff request

## 2021-11-14 NOTE — SLP NOTE
ADULT SWALLOWING EVALUATION    ASSESSMENT    ASSESSMENT/OVERALL IMPRESSION:  Patient seen this AM for bedside swallow evaluation.  RN approved visit and reported patient tolerating diet well but requesting thinner liquids as she was drinking thin liquids at Plan/Recommendations: Dysphagia therapy; Aspiration precautions  Discharge Recommendations/Plan: Home    HISTORY   MEDICAL HISTORY  Reason for Referral: Altered diet consistency    Problem List  Principal Problem:    Cellulitis of left lower extremity  Acti Formation: Intact  Bolus Propulsion: Intact  Mastication: Impaired       Pharyngeal Phase of Swallow: Within Functional Limits           (Please note: Silent aspiration cannot be evaluated clinically.  Videofluoroscopic Swallow Study is required to rule-out Labs/EKG/Imaging Studies/Medications

## 2022-12-19 ENCOUNTER — HOSPITAL ENCOUNTER (EMERGENCY)
Facility: HOSPITAL | Age: 62
Discharge: HOME | End: 2022-12-19
Attending: EMERGENCY MEDICINE
Payer: MEDICARE

## 2022-12-19 VITALS
WEIGHT: 160 LBS | DIASTOLIC BLOOD PRESSURE: 101 MMHG | RESPIRATION RATE: 19 BRPM | TEMPERATURE: 99 F | SYSTOLIC BLOOD PRESSURE: 150 MMHG | BODY MASS INDEX: 28 KG/M2 | OXYGEN SATURATION: 95 % | HEART RATE: 80 BPM

## 2022-12-19 DIAGNOSIS — H60.331 ACUTE SWIMMER'S EAR OF RIGHT SIDE: Primary | ICD-10-CM

## 2022-12-19 PROCEDURE — 99283 EMERGENCY DEPT VISIT LOW MDM: CPT

## 2022-12-19 RX ORDER — CIPROFLOXACIN AND DEXAMETHASONE 3; 1 MG/ML; MG/ML
4 SUSPENSION/ DROPS AURICULAR (OTIC) 2 TIMES DAILY
Status: DISCONTINUED | OUTPATIENT
Start: 2022-12-19 | End: 2022-12-19

## 2022-12-19 RX ORDER — AMOXICILLIN 875 MG/1
875 TABLET, COATED ORAL 2 TIMES DAILY
Qty: 20 TABLET | Refills: 0 | Status: SHIPPED | OUTPATIENT
Start: 2022-12-19 | End: 2022-12-29

## 2022-12-19 NOTE — ED INITIAL ASSESSMENT (HPI)
Arrives by EMS from apartment where she has a caregiver. Complains of right ear pain x1 week. No cold symptoms, afebrile. History of CP and MS.

## 2022-12-19 NOTE — DISCHARGE INSTRUCTIONS
Follow-up for further evaluation with primary physician. Return if new or worse symptoms. Ciprodex antibiotics as given. 4 drops twice daily to the right ear for the next week. Amoxicillin as prescribed. Ear wick removal with primary physician in the next 2 days. Tylenol as needed for pain.

## (undated) NOTE — IP AVS SNAPSHOT
[address]            (For Outpatient Use Only) Initial Admit Date: 1/30/2019   Inpt/Obs Admit Date: Inpt: 1/30/19 / Obs: N/A   Discharge Date:    Archana Colon:  [de-identified]   MRN: [de-identified]   CSN: [id number]        ENCOUNTER  Patient Subscriber Name:  Loki Dillard :    Subscriber ID:  Pt Rel to Subscriber:    Hospital Account Financial Class: Medicare    2019

## (undated) NOTE — ED AVS SNAPSHOT
[facility] Emergency Department    [place]  [address] [address]    Phone:  [phone]    Fax:  [address]   MRN: [id number]    Department:  [facility] Emergency Department   Date of Visit:  4/14 IF THERE IS ANY CHANGE OR WORSENING OF YOUR CONDITION, CALL YOUR PRIMARY CARE PHYSICIAN AT ONCE OR RETURN IMMEDIATELY TO THE EMERGENCY DEPARTMENT.     If you have been prescribed any medication(s), please fill your prescription right away and begin taking t

## (undated) NOTE — IP AVS SNAPSHOT
[address]            (For Outpatient Use Only) Initial Admit Date: 9/20/2018   Inpt/Obs Admit Date: Inpt: 9/21/18 / Obs: N/A   Discharge Date:    Link Pattee:  [de-identified]   MRN: [de-identified]   CSN: [id number]        ENCOUNTER  Patient Subscriber Name:  Ciara Bunantonio :    Subscriber ID:  Pt Rel to Subscriber:    Hospital Account Financial Class: Medicare    2018

## (undated) NOTE — ED AVS SNAPSHOT
[facility] Emergency Department    [place]  [address] [address]    Phone:  [phone]    Fax:  [address]   MRN: [id number]    Department:  [facility] Emergency Department   Date of Visit:  3/28 Pediatric [phone] Emergency Department   [phone]       To Check ER Wait Times:  TEXT 'ERwait' to 16680      Click [email or web address]      Or call [phone]    If you have any problems with your follow-up, please call our case [facility] before you leave. After you leave, you should follow the attached instructions. I have read and understand the instructions given to me by my caregivers. 24-Hour Pharmacies        Pharmacy Address Phone Number   [address] Impression:    CONCLUSION:  No lobar pneumonia or congestive failure. Mild atelectasis/scarring in the lower lungs. Dictated by: Brenda London MD on 3/28/2017 at 22:58       Approved by:  Brenda London MD              Narrative:    PROCEDURE:

## (undated) NOTE — ED AVS SNAPSHOT
[facility] Emergency Department    [place]  [address] [address]    Phone:  [phone]    Fax:  [address]   MRN: [id number]    Department:  [facility] Emergency Department   Date of Visit:  4/14 Si usted tiene algun problema con vizcarra sequimiento, por favor llame a nuestro adminstrador de addie al [phone]    Expect to receive an electronic request (by e-mail or text) to complete a self-assessment the day after your visit.   You may also receiv [facility] [address] [facility] ([facility]) [address] ([address]) [address]  ([address]) [address] [place] [facility] Narrative:    PROCEDURE:  US ABDOMEN COMPLETE (CPT=76700)     COMPARISON:  [facility] , CT ABDOMEN+PELVIS(CPT=74176), 3/22/2017, 17:52. INDICATIONS:  Right upper quadrant abdominal pain.   TECHNIQUE:  Real time gray-scale ultrasound was used to evaluate th

## (undated) NOTE — IP AVS SNAPSHOT
Patient Demographics     Address  [address] Phone  [phone] [facility])      Emergency Contact(s)     Name Relation Home Work [place] Mother [phone]    Yvrose Carty Sister [phone] Potassium Chloride ER (K-DUR M20) CR tab 40 mEq 09/24/18 2054 Given      [id number] ceFAZolin (ANCEF) IVPB 1g/100ml in 0.9% NaCl minibag/add-van 09/24/18 1719 New Bag      [id number] ceFAZolin (ANCEF) IVPB 1g/100ml in 0.9% NaCl minibag/add-van 09/ Blood Culture Result No Growth 4 Days    Blood Culture FREQ X 2 [[id number]] Collected:  09/20/18 2343    Order Status:  Completed Lab Status:  Preliminary result Updated:  09/25/18 0000    Specimen:  Blood,peripheral      Blood Culture Result No Growth 4 twice daily. Disp: 14 g Rfl: 0   PEG 3350 Oral Powd Pack Take 17 g by mouth daily as needed. Disp: 10 each Rfl: 11   Phenytoin Sodium Extended 100 MG Oral Cap Take 300 mg by mouth daily.  Disp:  Rfl:        Review of Systems:   A comprehensive 14 point revi Imaging: Imaging data reviewed in Epic. ASSESSMENT / PLAN:     1. Fevers/leukocytosis-unclear if from possible LLE cellulitis or from gastroenteritis  2. Vomiting-suspect gastroenteritis-prn antiemetics and maintenance fluids  3.  Possible LLE cellul a large sigmoid diverticulum. A left periaortic node increased from 1.4 to 1.7 cm. Pelvic node increased from 2 to 2.3 cm. Another pelvic node was stable at 2.3 cm. She has had chronic leg edema. The left leg and foot have swelling with erythema.     CT of air-filled measuring up to 3.8 cm having the appearances of the giant diverticulum.     PMH:[JK.1]  Past Medical History:  No date: CP (cerebral palsy) (HCC)  No date: Influenza  No date: Lymph edema  No date: Muscle weakness  No date: Seizure disorder ([address] Heart: Regular rate and rhythm. Abdomen: Soft, non tender with good bowel sounds. Extremities: She has erythema and edema in the left foot and lower leg. Lymphatics:  There is no palpable lymphadenopathy throughout in the cervical, supraclavicular, axil globulin level. I will get a monoclonal protein study and quant Ig levels. Send CRP and ESR. I will review with radiology to see if biopsy could be successful. 2. Leukocytosis, neutrophilia:    Likely reactive.     3. Left leg edema and erythema  Probab Diet Recommendations - Liquid: Thin    Compensatory Strategies Recommended: Slow rate;Small bites and sips; Pinched straw sips; Extra sauce/gravy  Aspiration Precautions: Upright position; Slow rate;Small bites and sips  Medication Administration Recommendati

## (undated) NOTE — IP AVS SNAPSHOT
Patient Demographics     Address Phone    [facility] [place] [address] [phone] [facility])      Emergency Contact(s)     Name [address] Mother   [phone]    ,Nancy Gifford   [address] Where to Get Your Medications      Please  your prescriptions at the location directed by your doctor or nurse     Bring a paper prescription for each of these medications    - cephALEXin 500 MG Caps            [id number] - MAR ACTION REPORT  (last 2 Type Source Collected On   Blood  03/10/17 0615          Components       Value Reference Range Flag Lab   BUN 13 8-20 mg/dL  [employer]            CREATININE, SERUM [[id number]] (Abnormal)  Resulted: 03/10/17 0716, Result status: Final result    Ordering p Does note chills. No other complaints at this time.      Past Medical History:  Past Medical History   Diagnosis Date   • CP (cerebral palsy) ([facility] [address]    • Seizure disorder ([facility] [address]    • Lymph edema         Past Surgical History:     Past Surgical History    OTHE Extremities: B/L LE lymphedema, erythema of LLE with skin breakdown   Integument: No rashes or lesions. Psychiatric: Appropriate mood and affect.       Diagnostic Data:      Labs:  Recent Labs   Lab  03/06/17   0716   WBC  6.4   HGB  13.3   MCV  91.7   PL :  Letitia Tovar MD (Physician)       Consult Orders:    1.  IP consult to Infectious Disease Once [[id number]] ordered by Charla Prader, MD at 03/06/17 1102                                                                    INFECTIOUS DISEASE •  furosemide (LASIX) injection 40 mg, 40 mg, Intravenous, BID (Diuretic)  •  calciTRIOL (ROCALTROL) cap 0.25 mcg, 0.25 mcg, Oral, Daily  •  Vancomycin HCl (VANCOCIN) 1,250 mg in sodium chloride 0.9 % 500 mL IVPB, 25 mg/kg (Order-Specific), Intravenous, On Contusion of leg, left, initial encounter     Gastroesophageal reflux disease      ASSESSMENT/PLAN:  1.  Chronic lymphedema, increased swelling and redness  -underlying cebral palsy  Afebrile, normal wbc no left shift  -continue leg elevation, IV cefazol • CP (cerebral palsy) ([facility])    • Seizure disorder [facility])    • Lymph edema        Past Surgical History      Past Surgical History    OTHER      Comment Patient states she had surgery as a child for her CP. Unsure of what it was.        HOME SITUATION  Type \"I am being realistic, I know that I am getting older and weaker, but I want to stay home as long as possible. \"    OBJECTIVE  Precautions: Seizure; Other (Comment) (B LE lymphedema )  Fall Risk: High fall risk    WEIGHT BEARING RESTRICTION  Weight Bearing Patient currently does not meet criteria for skilled inpatient Occupational Therapy services, however patient will remain on IP Restorative Team and will continue with B UE A/AROM HEP exercises to maintain current level of mobility.  The rehab aide will per observed with thin liquids via straw with no s/s of aspiration. Pt states that she does not have difficulty swallowing. Reviewed aspiration precautions with pt with good understanding verbalized.      Diet Recommendations - Solids: Regular  Diet Recommend

## (undated) NOTE — IP AVS SNAPSHOT
[facility] [place] [address] [place], [address] ~ [phone]                Discharge Summary   3/6/2017    [place] Like           Admission Information        Provider Department    3/6/2017 Zari Rodas MD  5nw-A         Erika Eugene Commonly known as:  LASIX   Next dose due:  3/11        Take 40 mg by mouth daily.                             Phenytoin Sodium Extended 100 MG Caps   Last time this was given:  300 mg on 3/10/2017  8:37 AM   Commonly known as:  DILANTIN   Next dose due:  3 Metabolic Lab Results  (Last result in the past 90 days)    ALT Bilirubin,Total Total Protein Albumin Sodium Potassium Chloride    (03/06/17)  22 (03/06/17)  0.2 (03/06/17)  7.5 (03/06/17)  3.2 (L) (03/06/17)  144 (03/09/17)  4.1 (03/06/17)  109      Radio If you have questions, you can call [phone] to talk to our [facility] Staff. Remember, MyChart is NOT to be used for urgent needs.   For medical emergencies, dial 911.             _________________________________________________________________ Most common side effects:  Dizziness, swelling, appetite changes, weight changes, changes in sleep and alertness, palpitations   What to report to your healthcare team:  Changes in thinking, confusion, skin swelling, palpitations, dizziness           Gener

## (undated) NOTE — IP AVS SNAPSHOT
[address]            (For Outpatient Use Only) Initial Admit Date: 7/21/2018   Inpt/Obs Admit Date: Inpt: 7/23/18 / Obs: 07/21/18   Discharge Date:    Hospital Acct:  [de-identified]   MRN: [de-identified]   CSN: [id number]        ENCOUNTER  Pa Group Number:  Insurance Type:    Subscriber Name:  Subscriber :    Subscriber ID:  Pt Rel to Subscriber:    Hospital Account Financial Class: Medicare    2018

## (undated) NOTE — IP AVS SNAPSHOT
Patient Demographics     Address  [address] [place] [address] Phone  [phone] [facility]) *Preferred*  [phone] [facility])      Emergency Contact(s)     Name Relation Home Work [place] Mother [phone] Vitamin C 500 MG Tabs  Commonly known as:  VITAMIN C      Take 500 mg by mouth daily. Vitamin D 2000 units Caps      Take 2,000 Units by mouth daily.           Warfarin Sodium 2.5 MG Tabs  Commonly known as:  COUMADIN      Take 1 tablet (2. Recent Vital Signs       Most Recent Value   Vitals  138/96  (Abnormal)  Filed at 04/18/2019 1200   Pulse  93 Filed at 04/18/2019 1200   Resp  18 Filed at 04/18/2019 1200   Temp  97.9 °F (36.6 °C) Filed at 04/18/2019 1200   SpO2  93 % Filed at 04/18/2019 1 Component Value Reference Range Flag Lab   Glucose 104 70 - 99 mg/dL [facility]   Sodium 139 136 - 145 mmol/L — [facility]   Potassium 3.9 3.5 - 5.1 mmol/L [facility]   Chloride 105 98 - 112 mmol/L — [facility]   CO2 28.0 21.0 - 32.0 mmol/L Ángel Wilson Type Source Collected On   Blood — 04/18/19 0521            Testing Performed By     Lab - Abbreviation Name Director Address Valid Date Range    [address] Rakesh Jimenez  S.  [address] [address] 02/03/16 1201 - lymphedema seizure disorder comes to the emergency room with fever up to 101.2 and having some left leg discomfort since yesterday evening. Patient does get intermittent infections in the lower extremities and is on cefadroxil for prophylaxis.   Patient un Ht 5' 3\" (1.6 m)   Wt 150 lb (68 kg)   SpO2 100%   BMI 26.57 kg/m²   General: No acute distress. Alert and oriented x 3. HEENT: Normocephalic atraumatic. Moist mucous membranes. EOM-I. PERRLA. Anicteric. Neck: No lymphadenopathy. No JVD.  No carotid br 2. Lymphedema-chronic. Patient not on any medications. 3. Subacute DVT-ultrasound shows occlusive thrombus in the right common femoral vein that was not noted to be there on March 4 2019. Patient started on Xarelto in the ER.   4. Seizure disorder-we chun on cefadroxil for prophylaxis. Patient unable to give full history due to cerebral palsy but she denies any chest pain shortness of breath, chills, nausea, vomiting, diarrhea, constipation. [RZ.2]    Past Medical History:  Past Medical History:   Diagnosis Neck: No lymphadenopathy. No JVD. No carotid bruits. Respiratory: Clear to auscultation bilaterally. No wheezes. No rhonchi. Cardiovascular: S1, S2. Regular rate and rhythm. No murmurs, rubs or gallops. Equal pulses.    Chest and Back: No tenderness or de 4. Seizure disorder-we will continue on Dilantin. [RZ.2]    Quality:  · DVT Prophylaxis:[RZ.1] Xarelto[RZ. 2]  · CODE status:[RZ.1] Full[RZ. 2]  · Bunch:[RZ.1] None[RZ. 2]    Plan of care discussed with[RZ. 1] patient and ER physician[RZ. Shanelle 417 The patient has chronic seizure disease. She is on chronic phenytoin. US Venous Doppler of the left leg on 4/13/2019:  FINDINGS:    EXTREMITY EXAMINED: Left lower extremity  SAPHENOFEMORAL JUNCTION:  No reflux.   THROMBI: Partially occlusive thrombus in Vital Signs:[JK.1] /71   Pulse 102   Temp 99.4 °F (37.4 °C) (Oral)   Resp 20   Ht 1.6 m (5' 3\")   Wt 69.4 kg (153 lb)   SpO2 95%   BMI 27.10 kg/m²[JK.2]   HEENT: Oropharynx is clear. Neck: No palpable lymphadenopathy. Neck is supple.   Chest: Clear Acute renal failure, unspecified acute renal failure type ([facility] [address]     Acute renal failure (HCC)     Intractable vomiting with nausea     Seizure (HCC)     Intractable vomiting with nausea, unspecified vomiting type     Fever, unspecified fever cause     I Electronically signed by Cain Linder MD on 4/15/2019  5:13 PM   Attribution Key    JK. 1 - Cain Linder MD on 4/15/2019  4:57 PM  ESTELITA.2 - Cain Linder MD on 4/15/2019  4:58 PM                     D/C Summary    No notes of this type exist for this sister has nothing to do with it. I have a mind of my own. \"  The pt understands that he physical function is not going to respond to therapy due to the underlying disease conditions, for many years.  Pt did have a trial of HHPT recently for only PROM, but

## (undated) NOTE — IP AVS SNAPSHOT
Patient Demographics     Address  [address] [place] [address] Phone  [phone] [facility])      Emergency Contact(s)     Name Relation Home Work [place] Mother [phone]    Ma Lev Sister [phone] Potassium Gluconate 595 (99 K) MG Tabs  Next dose due:  10/30      Take 1 tablet by mouth daily. Vitamin C 500 MG Tabs  Commonly known as:  VITAMIN C  Next dose due:  10/30      Take 500 mg by mouth daily.           Vitamin D 2000 units Caps  Next SpO2  96 % Filed at 10/29/2018 1130      Patient's Most Recent Weight       Most Recent Value   Patient Weight  160 lb      Lab Results Last 24 Hours    No matching results found     Microbiology Results (All)     Procedure Component Value Units Date/Time Related Notes:  Original Note by Aruna Pacheco MD (Physician) filed at 10/25/2018  5:43 PM         [facility]  History and Physical     Whitetop Marcus Patient Status:  Emergency    1960 MRN [id number]   [facility] 26   Wt 158 lb 1.1 oz (71.7 kg)   SpO2 94%   BMI 30.87 kg/m²   General: No acute distress. Alert and oriented x 3. HEENT: Normocephalic atraumatic. Moist mucous membranes. EOM-I. PERRLA. Anicteric. Neck: No lymphadenopathy. No JVD. No carotid bruits.   Re 2. Cerebral palsy with functional quadraplegia[JM.1]  3. Abdominal adenopathy-consider repeat imaging soon to r/o neoplastic process; if doesn't improve[JM.2] quickly during this[JM.5] hospital course, consider repeat CT this hospital course[JM.2]  4.  Marlene Barrios Diagnosis Date   • CP (cerebral palsy) (HCC)    • Influenza    • Lymph edema    • Muscle weakness    • Seizure disorder [facility])         Past Surgical History:   Past Surgical History:   Procedure Laterality Date   • OTHER      Patient states she had surgery contractures[JM.2]  Extremities:[JM.1] n[JM.2]o rashes or lesions[JM.1], except LLE redness and swelling, seemingly lymphedema[JM.2]. Psychiatric: Appropriate mood and affect. [JM.1]  Cap refill<2 seconds; warm and seemingly well perfused with appropriate H&P signed by Jarod Priteo MD at 10/25/2018  5:43 PM  Version 3 of 5    Author:  Jarod Prieto MD Service:  Sekou Navarro Author Type:  Physician    Filed:  10/25/2018  5:43 PM Date of Service:  10/25/2018  5:37 PM Status:  Addendum    :   Jarod Prieto MD PEG 3350 Oral Powd Pack Take 17 g by mouth daily as needed. Disp: 10 each Rfl: 11   Phenytoin Sodium Extended 100 MG Oral Cap Take 300 mg by mouth daily. Disp:  Rfl:        Review of Systems:   A comprehensive 14 point review of systems was completed.     P Imaging: Imaging data reviewed in Epic. ASSESSMENT / PLAN:     1.  Fever/sepsis; unclear source; gastroenteritis vs possible cellulitis (patient says LLE always looks this way); empiric ancef for now: ID consult; cultures pending; u/a pending[JM.1]; ch abdominal pain. Says her left leg hurts but that it always looks and feels this way. Has mild cough but nothing serious in her opinion. No dyspnea. [JM.2]    Past Medical History:  Past Medical History:   Diagnosis Date   • CP (cerebral palsy) ([facility] [address]    • Abdomen: Soft, nontender, nondistended. Positive bowel sounds. No rebound, guarding or organomegaly.   Neurologic:[JM.1] weakness throughout; cranial nerves intact[JM.2]  Musculoskeletal:[JM.1] stigmata of cerebral palsy with weakness and contractures[JM.2 JM.3 - Tabatha Stubbs MD on 10/25/2018  5:42 PM               H&P signed by Tabatha Stubbs MD at 10/25/2018  5:42 PM  Version 1 of 5    Author:  Tabatha Stubbs MD Service:  Curly Goldmann Author Type:  Physician    Filed:  10/25/2018  5:42 PM Date of Service:  10/25 Rfl: 11   Phenytoin Sodium Extended 100 MG Oral Cap Take 300 mg by mouth daily. Disp:  Rfl:        Review of Systems:   A comprehensive 14 point review of systems was completed. Pertinent positives and negatives noted in the HPI.     Physical Exam:    BP (patient says LLE always looks this way); empiric ancef for now: ID consult; cultures pending; u/a pending[JM.1]; check respiratory panel[JM.2]  2.  Cerebral palsy with functional quadraplegia[JM.1]  3. Abdominal adenopathy-consider repeat imaging soon to r 10.25 with fever 102 and swelling redness left lower leg, received zosyn and vancomycin in ED.        History:[GM.1]  Past Medical History:   Diagnosis Date   • CP (cerebral palsy) (HCC)    • Influenza    • Lymph edema    • Muscle weakness    • Seizure diso Phenytoin Sodium Extended 100 MG Oral Cap Take 300 mg by mouth daily. Disp:  Rfl:[GM.2]        Review of Systems:    A comprehensive 10 point review of systems was completed. Pertinent positives and negatives noted in the the HPI.       Physical Exam:    G Acute renal failure (HCC)     Intractable vomiting with nausea     Seizure (HCC)     Intractable vomiting with nausea, unspecified vomiting type     Fever, unspecified fever cause     Intractable nausea and vomiting     Lymphadenopathy     Abdominal lym ASSESSMENT/OVERALL IMPRESSION: Patient seen for clinical bedside swallowing evaluation; RN approved session.  Pleasant 62year old female admitted to [facility] on[JM.1] 10/25/2018[JM.2] secondary to[JM.1] Cellulitis of left lower extremity [L03.116][J observed. No multiple swallows observed; no automatic or delayed cough; no complaints of globus sensation. Hyolaryngeal excursion appears WFL per palpation.      Patient presents with chronic mild oral dysphagia characterized by reduced bolus manipulation a Symmetry: Reduced right facial  Strength: Reduced right facial  Tone: Reduced right facial  Range of Motion: Reduced right facial;Reduced right lingual;Reduced left lingual       Voice Quality: Harsh/Strained  Respiratory Status: Unlabored  Consistencies T

## (undated) NOTE — ED AVS SNAPSHOT
[facility] Emergency Department    [place]  [address] [address]    Phone:  [phone]    Fax:  [address]   MRN: [id number]    Department:  [facility] Emergency Department   Date of Visit:  6/9/ self-assessment the day after your visit. You may also receive a call from our patient liason soon after your visit. Also, some patients receive a detailed feedback survey mailed to them a week after the visit.   If you receive this, we would really apprec [address] [phone] [address] (68 [facility] Irph3406 [address] ([address]) [address] [place] [address] If you have questions, you can call [phone] to talk to our [facility] Staff. Remember, Novushart is NOT to be used for urgent needs. For medical emergencies, dial 911.

## (undated) NOTE — IP AVS SNAPSHOT
[facility] [place] [address] [place], [address] ~ [phone]                Discharge Summary   3/22/2017    Criselda Helm           Admission Information        Provider Department    3/22/2017 Kerry Matthews MD  5nw-A Last time this was given:  0.25 mcg on 3/24/2017  9:09 AM   Commonly known as:  ROCALTROL        Take 0.25 mcg by mouth daily.                             famoTIDine 20 MG Tabs   Commonly known as:  PEPCID        Take 20 mg by mouth 2 (two) times daily as n (03/22/17)  18.3 (H) (03/22/17)  5.94 (H) (03/22/17)  17.6 (H) (03/22/17)  52.4 (H) (03/22/17)  88.2    (03/22/17)  285.0     (03/11/17)  4.8 (03/11/17)  4.14 (03/11/17)  12.1 (03/11/17)  37.0 (03/11/17)  89.4    (03/11/17)  195.0       Recent Hematology - If you have concerns related to behavioral health issues or thoughts of harming yourself, contact [facility] at [phone].     - If you don’t have insurance, [facility] has partnered with [facility] Robert call your provider or healthcare team if you have any questions regarding your medications while at home. Ant-Infective Medications     cephALEXin 500 MG Oral Cap         Use: Treat infections or suspected infection   Most common side effects:  Albert Power Headache, Nausea/vomiting           All Other Medications     Potassium Chloride ER 20 MEQ Oral Tab CR

## (undated) NOTE — IP AVS SNAPSHOT
[address]            (For Outpatient Use Only) Initial Admit Date: 4/13/2019   Inpt/Obs Admit Date: Inpt: 4/13/19 / Obs: N/A   Discharge Date:    Archana Colon:  [de-identified]   MRN: [de-identified]   CSN: [id number]   CEID: [id number] Subscriber Name:  Micheal Walker DOB:    Subscriber ID:  Pt Rel to Subscriber:    Hospital Account Financial Class: Medicare    2019

## (undated) NOTE — IP AVS SNAPSHOT
[address]            (For Outpatient Use Only) Initial Admit Date: 6/27/2019   Inpt/Obs Admit Date: Inpt: 6/27/19 / Obs: N/A   Discharge Date:    Gera Rutherford:  [de-identified]   MRN: [de-identified]   CSN: [id number]   CEID: [id number] Subscriber Name:  Quynh Osorio DOB:    Subscriber ID:  Pt Rel to Subscriber:    Hospital Account Financial Class: Medicare    2019

## (undated) NOTE — IP AVS SNAPSHOT
Patient Demographics     Address  [address] [place] [address] Phone  [phone] [facility]) *Preferred*  [phone] [facility])      Emergency Contact(s)     Name Relation Home Work Vijaya Mother [phone] Wednesday, Friday, Saturday, Sunday          Warfarin Sodium 1 MG Tabs  Commonly known as:  COUMADIN      Take as directed. If you are unsure how to take this medication, talk to your nurse or doctor. Original instructions:   Take 1 mg by mouth See Admin I Lab Results Last 24 Hours      PROTHROMBIN TIME (PT) [[id number]] (Abnormal)  Resulted: 10/28/19 0707, Result status: Final result   Ordering provider:  Ольга White DO  10/27/19 2302 Resulting lab:  [facility]    Specimen Information    Type Specimen:  Blood,peripheral      Blood Culture Result No Growth 4 Days         H&P - H&P Note      H&P signed by Sheyla Miles MD at 10/24/2019  2:13 PM  Version 2 of 2    Author:  Sheyla Miles MD Service:  Hospitalist Author Type:  Physician    Filed: Problem Relation Age of Onset   • Heart Disorder Father    • Other (Other) Mother         arthritis   • Other (Other) Sister         arthritis        Allergies:   Sulfa Antibiotics           Medications:  No current facility-administered medications on xochitl Musculoskeletal: Moves all extremities. Extremities:[DK.1] bilateral UE and LE contractures[DK. 2]  Integument:[DK.1] LLE erythema, swelling, warmth0-=[DK. 2]  Psychiatric: Appropriate mood and affect.       Diagnostic Data:      Labs:  Recent Labs   Lab 10/ Sadiaconchita Velazquez Patient Status:  Emergency    1960 MRN [id number]   Location [facility] Attending Chuy Huntley MD   Hosp Day # 0 PCP Jennifer Ybarra     Chief Complaint: LE cellulitis    History of Present Illness: Kat (two) times daily. , Disp: 60 tablet, Rfl: 11  Warfarin Sodium 5 MG Oral Tab, Take 5 mg by mouth 3 (three) times a week. Monday, Wednesday, Friday, Saturday, Sunday , Disp: , Rfl:   Acidophilus/Pectin Oral Cap, Take 1 capsule by mouth daily. , Disp: 30 capsu AST 13*   ALT 20   BILT 0.3   TP 7.1       Estimated Creatinine Clearance: 86 mL/min (based on SCr of 0.59 mg/dL). Recent Labs   Lab 10/22/19 10/23/19  1154   PTP  --  27.5*   INR 2.6* 2.38*       No results for input(s): TROP, CK in the last 168 hours. Michael Arias is a a(n) 62year old female with cerbral palsy, bed bound, chronic lymphedema, recurrent bouts of LLE celluliits. She was hospitalized 7/2018, 10/2018, 12/2018, 3/2019, and 8/2019.  She had been placed on PCN prophylaxis but had fallen off No current facility-administered medications on file prior to encounter. Warfarin Sodium 1 MG Oral Tab, Take 1 mg by mouth nightly.  Tuesdays and Thursday, Disp: , Rfl:   penicillin v potassium 500 MG Oral Tab, Take 1 tablet (500 mg total) by mouth 4 (fou Lab Results   Component Value Date    PTT 36.4 10/23/2019    INR 2.38 10/23/2019    PTP 27.5 10/23/2019    TROP <0.045 10/23/2019           Problem list reviewed:  Patient Active Problem List:     Cellulitis     Cerebral palsy ([facility] [address]     Seizure disorder (H Supratherapeutic INR     Chronic deep vein thrombosis (DVT) of proximal vein of right lower extremity (HCC)      ASSESSMENT/PLAN:  1.  Recurrent LLE cellultis  Chronic lymphedema, had been placed on PO PCN prophylaxis but unable to confirm if was taking able to recall standard aspiration precautions. Pt administered PO trials of thin liquid via straw, puree, and soft solid. Noted decreased rotary mastication and bolus formation with solid trials. Suspect premature spillage with thin liquid.  Patient exhib • Pulmonary embolism (HCC)     right leg blood clot   • Seizure disorder [facility])        Prior Living Situation: Home with support  Diet Prior to Admission: Regular;Mechanical soft chopped; Thin liquids       Patient/Family Goals: to eat/drink regular    SWALL

## (undated) NOTE — IP AVS SNAPSHOT
Patient Demographics     Address Phone    [facility] [address]  [address] [phone] [facility])      Emergency Contact(s)     Name [address] Mother   [phone]    ,Ivan Gant   [address] Take 17 g by mouth daily as needed. Manish Ureña                           Phenytoin Sodium Extended 100 MG Caps   Last time this was given:  300 mg on 3/24/2017  9:09 AM   Commonly known as:  DILANTIN        Take 300 mg by mouth daily. Most Recent Value    Vitals 106/63 mmHg Filed at 03/24/2017 1254    Pulse 76 Filed at 03/24/2017 1254    Resp 16 Filed at 03/24/2017 1254    Temp 98 °F (36.7 °C) Filed at 03/24/2017 1254    SpO2 100 % Filed at 03/24/2017 1254      Patient's Most Recent Problem Relation Age of Onset   • Family history unknown: Yes     Allergies:   Sulfa Antibiotics         Medications:    No current facility-administered medications on file prior to encounter.   Current Outpatient Prescriptions on File Prior to Encounter: No results for input(s): PTP, INR in the last 72 hours. No results for input(s): TROP, CK in the last 72 hours. Imaging:   Imaging data reviewed in Epic. ASSESSMENT / PLAN:     1. N/V  1. No diarrhea  2. Possibly due to ABX  3. Monitor  4.  IVF appropriate for acute PT as no functional goals present. Continue to rec 24 hour care at home vs LTC to ensure pt's safety being met.       Patient currently does not meet criteria for skilled inpatient physical therapy services, however patient will remai :  Lara Sprague (Occupational Therapist)           Pt is well known from multiple previous admissions. Pt is extremely limited in terms of functional mobility and ADL due to contractures.  As recommended last admission, pt would greatly benefit fr

## (undated) NOTE — ED AVS SNAPSHOT
[facility] Emergency Department    [place]  [address] [address]    Phone:  [phone]    Fax:  [address]   MRN: [id number]    Department:  [facility] Emergency Department   Date of Visit:  6/9/ IF THERE IS ANY CHANGE OR WORSENING OF YOUR CONDITION, CALL YOUR PRIMARY CARE PHYSICIAN AT ONCE OR RETURN IMMEDIATELY TO THE EMERGENCY DEPARTMENT.     If you have been prescribed any medication(s), please fill your prescription right away and begin taking t

## (undated) NOTE — ED AVS SNAPSHOT
[facility] Emergency Department    [place]  [address] [address]    Phone:  [phone]    Fax:  [address]   MRN: [id number]    Department:  [facility] Emergency Department   Date of Visit:  4/12 nuestro adminstrador de addie mendoza [phone]    Expect to receive an electronic request (by e-mail or text) to complete a self-assessment the day after your visit. You may also receive a call from our patient liason soon after your visit.  Also, some p [facility] ([address]) [address]  ([address]) [address] [facility]. ([address]) [address] 30. (8 COMPARISON:  [facility] , XR ROUTINE THORACIC SPINE (3 VIEWS) (CPT=72072), 4/12/2017, 10:26. [facility] , XR CHEST AP PORTABLE  (CPT=71010), 4/12/2017, 9:39.      INDICATIONS:  SOB     PATIENT STATED HISTORY: (As transcribed by Technologist)  Patient presents to t PROCEDURE:  XR CHEST AP PORTABLE (CPT=71010)     TECHNIQUE:  AP chest radiograph was obtained. COMPARISON:  [facility] , XR CHEST PA + LAT CHEST (CPT=71020), 3/28/2017, 22:44.      INDICATIONS:  SOB     PATIENT STATED HISTORY: (As transcribed by [employer]

## (undated) NOTE — IP AVS SNAPSHOT
[address]            (For Outpatient Use Only) Initial Admit Date: 10/25/2018   Inpt/Obs Admit Date: Inpt: 10/25/18 / Obs: N/A   Discharge Date:    Pankaj Hanna:  [de-identified]   MRN: [de-identified]   CSN: [id number]        [facility] Group Number:  Insurance Type:    Subscriber Name:  Subscriber :    Subscriber ID:  Pt Rel to Subscriber:    Hospital Account Financial Class: Medicare    2018

## (undated) NOTE — ED AVS SNAPSHOT
Carey Boggs   MRN: [id number]    Department:  [facility] Emergency Department   Date of Visit:  8/21/2018           Disclosure     Insurance plans vary and the physician(s) referred by the ER may not be covered by your plan.  Please contact you tell this physician (or your personal doctor if your instructions are to return to your personal doctor) about any new or lasting problems. The primary care or specialist physician will see patients referred from the [facility] Emergency Department.  Arthor Bernheim

## (undated) NOTE — ED AVS SNAPSHOT
[facility] Emergency Department    [place]  [address] [address]    Phone:  [phone]    Fax:  [address]   MRN: [id number]    Department:  [facility] Emergency Department   Date of Visit:  4/12 IF THERE IS ANY CHANGE OR WORSENING OF YOUR CONDITION, CALL YOUR PRIMARY CARE PHYSICIAN AT ONCE OR RETURN IMMEDIATELY TO THE EMERGENCY DEPARTMENT.     If you have been prescribed any medication(s), please fill your prescription right away and begin taking t